# Patient Record
Sex: FEMALE | Race: WHITE | NOT HISPANIC OR LATINO | Employment: PART TIME | URBAN - NONMETROPOLITAN AREA
[De-identification: names, ages, dates, MRNs, and addresses within clinical notes are randomized per-mention and may not be internally consistent; named-entity substitution may affect disease eponyms.]

---

## 2017-01-19 ENCOUNTER — HOSPITAL ENCOUNTER (OUTPATIENT)
Dept: RADIOLOGY | Facility: OTHER | Age: 57
End: 2017-01-19
Attending: FAMILY MEDICINE

## 2017-01-19 ENCOUNTER — HISTORY (OUTPATIENT)
Dept: RADIOLOGY | Facility: OTHER | Age: 57
End: 2017-01-19

## 2017-01-19 DIAGNOSIS — Z12.31 ENCOUNTER FOR SCREENING MAMMOGRAM FOR MALIGNANT NEOPLASM OF BREAST: ICD-10-CM

## 2017-01-23 ENCOUNTER — AMBULATORY - GICH (OUTPATIENT)
Dept: RADIOLOGY | Facility: OTHER | Age: 57
End: 2017-01-23

## 2017-01-23 DIAGNOSIS — R92.8 OTHER ABNORMAL AND INCONCLUSIVE FINDINGS ON DIAGNOSTIC IMAGING OF BREAST: ICD-10-CM

## 2017-01-26 ENCOUNTER — HISTORY (OUTPATIENT)
Dept: RADIOLOGY | Facility: OTHER | Age: 57
End: 2017-01-26

## 2017-01-26 ENCOUNTER — AMBULATORY - GICH (OUTPATIENT)
Dept: RADIOLOGY | Facility: OTHER | Age: 57
End: 2017-01-26

## 2017-01-26 ENCOUNTER — HOSPITAL ENCOUNTER (OUTPATIENT)
Dept: RADIOLOGY | Facility: OTHER | Age: 57
End: 2017-01-26
Attending: FAMILY MEDICINE

## 2017-01-26 DIAGNOSIS — R92.8 OTHER ABNORMAL AND INCONCLUSIVE FINDINGS ON DIAGNOSTIC IMAGING OF BREAST: ICD-10-CM

## 2017-01-30 ENCOUNTER — AMBULATORY - GICH (OUTPATIENT)
Dept: RADIOLOGY | Facility: OTHER | Age: 57
End: 2017-01-30

## 2017-01-30 ENCOUNTER — HOSPITAL ENCOUNTER (OUTPATIENT)
Dept: RADIOLOGY | Facility: OTHER | Age: 57
End: 2017-01-30
Attending: FAMILY MEDICINE

## 2017-01-30 DIAGNOSIS — R92.8 OTHER ABNORMAL AND INCONCLUSIVE FINDINGS ON DIAGNOSTIC IMAGING OF BREAST: ICD-10-CM

## 2017-02-02 ENCOUNTER — OFFICE VISIT - GICH (OUTPATIENT)
Dept: SURGERY | Facility: OTHER | Age: 57
End: 2017-02-02

## 2017-02-02 ENCOUNTER — HISTORY (OUTPATIENT)
Dept: SURGERY | Facility: OTHER | Age: 57
End: 2017-02-02

## 2017-02-02 DIAGNOSIS — D24.2 BENIGN NEOPLASM OF LEFT BREAST: ICD-10-CM

## 2017-02-14 ENCOUNTER — HOSPITAL ENCOUNTER (OUTPATIENT)
Dept: RADIOLOGY | Facility: OTHER | Age: 57
End: 2017-02-14
Attending: FAMILY MEDICINE

## 2017-02-14 ENCOUNTER — COMMUNICATION - GICH (OUTPATIENT)
Dept: FAMILY MEDICINE | Facility: OTHER | Age: 57
End: 2017-02-14

## 2017-02-14 ENCOUNTER — OFFICE VISIT - GICH (OUTPATIENT)
Dept: FAMILY MEDICINE | Facility: OTHER | Age: 57
End: 2017-02-14

## 2017-02-14 ENCOUNTER — AMBULATORY - GICH (OUTPATIENT)
Dept: LAB | Facility: OTHER | Age: 57
End: 2017-02-14

## 2017-02-14 ENCOUNTER — HISTORY (OUTPATIENT)
Dept: FAMILY MEDICINE | Facility: OTHER | Age: 57
End: 2017-02-14

## 2017-02-14 DIAGNOSIS — R22.0 LOCALIZED SWELLING, MASS, AND LUMP OF HEAD: ICD-10-CM

## 2017-02-14 LAB
ANION GAP - HISTORICAL: 9 (ref 5–18)
BUN SERPL-MCNC: 17 MG/DL (ref 7–25)
BUN/CREAT RATIO - HISTORICAL: 20
CALCIUM SERPL-MCNC: 9.3 MG/DL (ref 8.6–10.3)
CHLORIDE SERPLBLD-SCNC: 104 MMOL/L (ref 98–107)
CO2 SERPL-SCNC: 27 MMOL/L (ref 21–31)
CREAT SERPL-MCNC: 0.85 MG/DL (ref 0.7–1.3)
GFR IF NOT AFRICAN AMERICAN - HISTORICAL: >60 ML/MIN/1.73M2
GLUCOSE SERPL-MCNC: 93 MG/DL (ref 70–105)
POTASSIUM SERPL-SCNC: 3.9 MMOL/L (ref 3.5–5.1)
SODIUM SERPL-SCNC: 140 MMOL/L (ref 133–143)

## 2017-06-14 ENCOUNTER — AMBULATORY - GICH (OUTPATIENT)
Dept: RADIOLOGY | Facility: OTHER | Age: 57
End: 2017-06-14

## 2017-06-14 DIAGNOSIS — Z98.890 OTHER SPECIFIED POSTPROCEDURAL STATES: ICD-10-CM

## 2017-09-06 ENCOUNTER — HISTORY (OUTPATIENT)
Dept: EMERGENCY MEDICINE | Facility: OTHER | Age: 57
End: 2017-09-06

## 2017-10-30 ENCOUNTER — AMBULATORY - GICH (OUTPATIENT)
Dept: RADIOLOGY | Facility: OTHER | Age: 57
End: 2017-10-30

## 2017-10-30 DIAGNOSIS — Z98.890 OTHER SPECIFIED POSTPROCEDURAL STATES: ICD-10-CM

## 2018-01-03 NOTE — PROGRESS NOTES
Patient Information     Patient Name MRN Sex Felicitas Douglas [id number] Female 1960      Progress Notes by Joyce Howard R.T. (ARRT) at 2017 12:46 PM     Author:  Joyce Howard R.T. (ARRT) Service:  (none) Author Type:  (none)     Filed:  2017 12:46 PM Date of Service:  2017 12:46 PM Status:  Signed     :  Joyce Howard R.T. (LUIS [facility]BERTOT) (Formerly [facility] - Registered Radiologic Technologist)            Falls Risk Criteria:    Age 65 and older or under age 4        Sensory deficits    Poor vision    Use of ambulatory aides    Impaired judgment    Unable to walk independently    Meets High Risk criteria for falls:  no

## 2018-01-03 NOTE — PROGRESS NOTES
"Patient Information     Patient Name MRN Sex Felicitas Douglas [id number] Female 1960      Progress Notes by Vel Conroy MD at 2017 10:15 AM     Author:  Vel Conroy MD Service:  (none) Author Type:  Physician     Filed:  2017 10:50 AM Encounter Date:  2017 Status:  Signed     :  Vel Conroy MD (Physician)            SUBJECTIVE:    Felicitas Rojas is a 57 y.o. female who presents for lump on jaw    HPI    Has notes a lump near the right mandible of her jaw.  Seems to come and go.  Worse swelling tin the night.  Can get painful when swollen.  Improves throughout the day.  First noted it 6 months ago or so.  Getting more frequent with flares.  No fevers.  Recently saw her dentist about this who advised she see us.  Not tooth related.    Allergies      Allergen   Reactions     Cobamamide  *Unknown - Pt Doesn't Remember     Cyclobenzaprine  Dizziness     Lightheadedness. Patient unsure if this is true.       Keflex [Cephalexin]  Itching     pt wrote \"redness irritation, inflamation , pus at point of incision\"  questionable allergy      Sulfa (Sulfonamide Antibiotics)  *Unknown - Childhood Rxn     Sulfacetamide Sodium  *Unknown - Pt Doesn't Remember     Vitamin B12-Vitamin B1  Rash     Sublingual vitamin    ,   Current Outpatient Prescriptions on File Prior to Visit       Medication  Sig Dispense Refill     aspirin (ECOTRIN) 81 mg enteric coated tablet Take 1 tablet by mouth once daily with a meal.  0     diltiazem (CARDIZEM) 60 mg tablet Take 1 tablet up to three times daily as needed for arrhythmia recurrence. 90 tablet 2     No current facility-administered medications on file prior to visit.    ,   Past Medical History      Diagnosis   Date     HERNIATED DISC       L5-S1       MENISCUS TEAR  2010     R knee; chronic mild problems       Postmenopausal  2014    and   Past Surgical History       Procedure   Laterality Date     Hx orif  Left      thumb fracture "       Release of sheath where tendons pass from thumb to wrist   1985 right & 2006 left     Forearm/wrist surgery   1985     Surgery for De Quervain's tenosynovitis right wrist about 1985       Skin/subcutaneous surgery   01/4/02     Excision of an intradermal nevus of the preauricular area       Hand finger surgery   03/06     Ulnar collateral ligament repair left thumb, Dr. Joy         REVIEW OF SYSTEMS:  Review of Systems   Constitutional: Negative for chills and fever.   Skin: Negative for itching and rash.   Neurological: Negative for headaches.       OBJECTIVE:  /70  Resp 16    EXAM:   Physical Exam   Constitutional: She is well-developed, well-nourished, and in no distress.   HENT:   Head: Normocephalic and atraumatic.   Right Ear: External ear normal.   Left Ear: External ear normal.   Mouth/Throat: Oropharynx is clear and moist.   Firm nodule along inferior right mandible, about 4 cm anterior to the angle.  1 cm or so in diameter.  feels fixed to the bone.   Lymphadenopathy:     She has no cervical adenopathy.       ASSESSMENT/PLAN:    ICD-10-CM    1. Mass of mandible R22.0 CT NECK SOFT TISSUE WO        Plan:  I suspect this is a cyst.  The fact this comes and goes, rather quickly, most likely means it is related to a salivary duct obstruction, possible stone.  Tumor is also possible so will proceed with the CT scan and have her try lemon drops in the meantime.    Vel Conroy MD ....................  2/14/2017   10:49 AM

## 2018-01-03 NOTE — PROGRESS NOTES
Patient Information     Patient Name MRN Sex Felicitas Douglas [id number] Female 1960      Progress Notes by Shonda Vitale DO at 2017 10:10 AM     Author:  Shonda Vitale DO Service:  (none) Author Type:  PHYS- Osteopathic     Filed:  2017 12:10 PM Encounter Date:  2017 Status:  Signed     :  Shonda Vitale DO (PHYS- Osteopathic)            OFFICE CONSULTATION NOTE  Patient Name: Felicitas Rojas  Address: [address] [address]  Age:57 y.o.  Sex: female     Primary Care Physician: Adriana Dominguez MD    I was requested to see this patient in consultation by  Adriana Dominguez MD for follow up from recent breast bopsy.   A copy of this note will be sent to Adriana Dominguez MD.    HPI:   The patient is 57 y.o. female for f/u from recent breast biopsy for an enlarging solid nodule on mary lou. She has no breast pain bilaterally. She has no nipple drainage bilaterally. She has not noted any new skin skin lesions on the breasts.  Patient has some moderate brusing from recent biopsy- otherwise no problems.          Previous breast problems: cysts  Previous breast biopsy's: no    Menarche: n/a  Age of first pregnancy:[age]    Breast feeding: tried  Menopause: approc 52  HRT: no    Family history    Breast problems: no  Breast cancer: no  Ovarian cancer: no  Colon cancer: polyps        REVIEW OF SYSTEMS  GENERAL: No fevers or chills. Denies fatigue, recent weight loss.  HEENT: No sinus drainage. No changes with vision or hearing. No difficulty swallowing.   LYMPHATICS:  No swollen nodes in axilla, neck or groin.  CARDIOVASCULAR: Denies chest pain, palpitations and dyspnea on exertion.  PULMONARY: No shortness of breath or cough. No increase in sputum production.  GI: Denies melena, bright red blood in stools. No hematemesis. No constipation or diarrhea.  : No dysuria or hematuria.  SKIN: No recent rashes or ulcers.   HEMATOLOGY:  No history of easy bruising or  "bleeding.  ENDOCRINE:  No history of diabetes or thyroid problems.  NEUROLOGY:  No history of seizures or headaches. No motor or sensory changes.  BREAST:  Denies breast pain.    PAST MEDICAL HISTORY  Past Medical History      Diagnosis   Date     HERNIATED DISC       L5-S1       MENISCUS TEAR  9/13/2010     R knee; chronic mild problems       Postmenopausal  7/17/2014      PAST SURGICAL HISTORY  Past Surgical History       Procedure   Laterality Date     Hx orif  Left 2006     thumb fracture       Release of sheath where tendons pass from thumb to wrist   1985 right & 2006 left     Forearm/wrist surgery   1985     Surgery for De Quervain's tenosynovitis right wrist about 1985       Skin/subcutaneous surgery   01/4/02     Excision of an intradermal nevus of the preauricular area       Hand finger surgery   03/06     Ulnar collateral ligament repair left thumb, Dr. Joy        CURRENT MEDS  Current Outpatient Prescriptions on File Prior to Visit       Medication  Sig Dispense Refill     aspirin (ECOTRIN) 81 mg enteric coated tablet Take 1 tablet by mouth once daily with a meal.  0     diltiazem (CARDIZEM) 60 mg tablet Take 1 tablet up to three times daily as needed for arrhythmia recurrence. 90 tablet 2     No current facility-administered medications on file prior to visit.      ALLERGIES/SENSITIVITIES  Allergies      Allergen   Reactions     Cobamamide  *Unknown - Pt Doesn't Remember     Cyclobenzaprine  Dizziness     Lightheadedness. Patient unsure if this is true.       Keflex [Cephalexin]  Itching     pt wrote \"redness irritation, inflamation , pus at point of incision\"  questionable allergy      Sulfa (Sulfonamide Antibiotics)  *Unknown - Childhood Rxn     Sulfacetamide Sodium  *Unknown - Pt Doesn't Remember     Vitamin B12-Vitamin B1  Rash     Sublingual vitamin      FAMILY HISTORY  Family History       Problem   Relation Age of Onset     Other  Mother 70     Alzheimer's       Cancer-prostate  Father  " "    Diabetes  Father      GI Disease  Father      colon polyps       Heart Disease  Father      CAD       Cancer  Paternal Grandmother      eye cancer, type unknown       Stroke  No Family History      No FHx of CAD       Cancer-breast  No Family History       SOCIAL HISTORY  Social History     Social History        Marital status:       Spouse name: N/A     Number of children:  N/A     Years of education:  N/A     Occupational History      Not on file.     Social History Main Topics        Smoking status:  Never Smoker     Smokeless tobacco:  Never Used     Alcohol use  No     Drug use:  No     Sexual activity:  Yes     Partners: Male     Other Topics   Concern      Service  No     Blood Transfusions  No     Caffeine Concern  No     recently eliminated caffeine 5/19/16      Occupational Exposure  No     Hobby Hazards  No     Sleep Concern  Yes     light sleeper, wakes up often in the night      Stress Concern  No     normal daily stressors-busy lifestyle      Weight Concern  Yes     \"higher than it's ever been, working on it\"      Special Diet  No     trying for lower sodium      Back Care  Yes     Chiropractor occasionally      Exercise  Yes     daily elliptical machine, lawn care and active lifestyle      Bike Helmet  No     Doesn't ride      Seat Belt  Yes     Self-Exams  No     Social History Narrative     .  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    Cullen Spouse     Children     Vinnie born 1985,     Tony born 1989.                  PHYSICAL EXAM  /76  Ht 1.727 m (5' 8\")  Wt 88.5 kg (195 lb)  BMI 29.65 kg/m2  Body mass index is 29.65 kg/(m^2).    GENERAL: Healthy appearing patient in no acute distress. Pleasant and cooperative with exam and interview.   HEENT: Head-normocephalic. Eyes-no scleral icterus, pupils equal, round, and reactive to light. Nose-no nasal drainage. No lesions. Mouth-oral mucosa pink and moist, no lesions.  NECK: Supple. Trachea midline.    CV: Regular " rate and rhythm, no murmurs. No peripheral edema.  LUNGS:  No respiratory distress. Clear bilaterally to auscultation.  ABDOMEN: Non distended. Bowel sounds active. Soft, non-tender,   SKIN: Pink, warm and dry. No jaundice. No rash.  NEURO:  Cranial nerves II-XII grossly intact. Alert and oriented.  PSYCH: Appropriate mood and affect.  BREAST: Breasts were examined in the seated and supine position.  No nipple changes or discharge bilaterally. Appropriate tenderness noted.  Skin changes: puncture site is C/D/I.  moderate bruising / sm hematoma       IMAGING/LAB  I personally reviewed patient's recent mamm and US  Report.  Pathology- see EPIC       Plan    Fibroadenoma  Biopsy done  Repeat mamm in 6 months L- orders placed  Routine self breast exams  Lifestyle/riks factor modification.  Follow up for yearly PE     Shonda Vitale DO

## 2018-01-03 NOTE — PROGRESS NOTES
Patient Information     Patient Name MRN Sex Felicitas Douglas [id number] Female 1960      Progress Notes by Hue Orozco at 2017  1:43 PM     Author:  Hue Orozco Service:  (none) Author Type:  Other Clinical Staff     Filed:  2017  1:43 PM Date of Service:  2017  1:43 PM Status:  Signed     :  Hue Orozco (Other Clinical Staff)            IV Contrast- Discharge Instructions After Your CT Scan      The IV contrast you received today will be filtered from your bloodstream by your kidneys during the next 24 hours and pass from the body in urine.  You will not be aware of this process and your urine will not change in color.  To help this process you should drink at least 4 additional glasses of water or juice today.  This reduces stress on your kidneys.    Most contrast reactions are immediate.  Should you develop symptoms of concern after discharge, contact the department at the number below.  After hours you should contact your personal physician.  If you develop breathing distress or wheezing, call 911.

## 2018-01-03 NOTE — PROGRESS NOTES
Patient Information     Patient Name MRN Sex Felicitas Douglas [id number] Female 1960      Progress Notes by Hue Orozco at 2017  1:43 PM     Author:  Hue Orozco Service:  (none) Author Type:  Other Clinical Staff     Filed:  2017  1:43 PM Date of Service:  2017  1:43 PM Status:  Signed     :  Hue Orozco (Other Clinical Staff)            1.  Has the patient had a previous reaction to IV contrast? No    2.  Does the patient have kidney disease? No    3.  Is the patient on dialysis? No    If YES to any of these questions, exam will be reviewed with a Radiologist before administering contrast.

## 2018-01-03 NOTE — PATIENT INSTRUCTIONS
Patient Information     Patient Name MRN Sex Felicitas Douglas [id number] Female 1960      Patient Instructions by Shonda Vitale DO at 2017 10:10 AM     Author:  Shonda Vitale DO  Service:  (none) Author Type:  PHYS- Osteopathic     Filed:  2017 10:35 AM  Encounter Date:  2017 Status:  Addendum     :  Shonda Vitale DO (PHYS- Osteopathic)        Related Notes: Original Note by Shonda Vitale DO (PHYS- Osteopathic) filed at 2017 10:34 AM            Repeat mamm on left side 6months  Use heat compress/heating pad for small hematoma    Diagnosis: fibroadenoma

## 2018-01-03 NOTE — PROGRESS NOTES
Patient Information     Patient Name MRN Sex Felicitas Douglas [id number] Female 1960      Progress Notes by Hue Orozco at 2017  1:43 PM     Author:  Hue Orozco Service:  (none) Author Type:  Other Clinical Staff     Filed:  2017  1:43 PM Date of Service:  2017  1:43 PM Status:  Signed     :  Hue Orozco (Other Clinical Staff)            Falls Risk Criteria:    Age 65 and older or under age 4        Sensory deficits    Poor vision    Use of ambulatory aides    Impaired judgment    Unable to walk independently    Meets High Risk criteria for falls:  no

## 2018-01-03 NOTE — PROGRESS NOTES
Patient Information     Patient Name MRN Sex Felicitas Douglas [id number] Female 1960      Progress Notes by Joyce Howard R.T. (ARRT) at 2017  3:23 PM     Author:  Joyce Howard R.T. (ARRT) Service:  (none) Author Type:  (none)     Filed:  2017  3:23 PM Date of Service:  2017  3:23 PM Status:  Signed     :  Joyce Howard R.T. ([facility]) ([facility] - Registered Radiologic Technologist)            Falls Risk Criteria:    Age 65 and older or under age 4        Sensory deficits    Poor vision    Use of ambulatory aides    Impaired judgment    Unable to walk independently    Meets High Risk criteria for falls:  no

## 2018-01-03 NOTE — PROGRESS NOTES
Patient Information     Patient Name MRN Sex Felicitas Douglas [id number] Female 1960      Progress Notes by Joyce Howard R.T. (ARRT) at 2017  3:33 PM     Author:  Joyce Howard R.T. (ARRT) Service:  (none) Author Type:  (none)     Filed:  2017  3:33 PM Date of Service:  2017  3:33 PM Status:  Signed     :  Joyce Howard R.T. (LUIS ALBERTOT) ([facility] - Registered Radiologic Technologist)            Falls Risk Criteria:    Age 65 and older or under age 4        Sensory deficits    Poor vision    Use of ambulatory aides    Impaired judgment    Unable to walk independently    Meets High Risk criteria for falls:  no

## 2018-01-03 NOTE — NURSING NOTE
Patient Information     Patient Name Felicitas Villaseñor [id number] Female 1960      Nursing Note by Lynn Wooten at 2017 10:10 AM     Author:  Lynn Wooten Service:  (none) Author Type:  (none)     Filed:  2017 10:18 AM Encounter Date:  2017 Status:  Signed     :  Lynn Wooten            Presents today for breast biopsy results.  Lynn Wooten LPN..........2017  10:13 AM    At what age did you start menopause? 50's  How many children do you have? 2  What age did your menstrual cycle start? 11  How old were you when your first child was born? 25  Did you breast feed? yes  Are you on or have you ever taken any hormone replacement or birth control? Birth control for a couple months  Do you have a family history of breast cancer? no    Lynn Wooten LPN..........2017  10:18 AM

## 2018-01-03 NOTE — PROGRESS NOTES
Patient Information     Patient Name MRN Sex Felicitas Douglas [id number] Female 1960      Progress Notes by Lyly Dickens RN at 2017  3:39 PM     Author:  Lyly Dickens RN Service:  (none) Author Type:  NURS- Registered Nurse     Filed:  2017  3:41 PM Date of Service:  2017  3:39 PM Status:  Signed     :  Lyly Dickens RN (NURS- Registered Nurse)            Pressure held on biopsy site for 5 minutes. Sterile 2x2 placed over insertion site and covered with a sterile tegaderm.    Patient brought to mamms for post clip mammogram:  Yes    Sports bra and small ice pad in place over dressing.      Discharge Note    Data:  Felicitas Rojas has been discharged home at 1538 via ambulatory accompanied by Registered Nurse.      Action:  Written discharge/follow-up instructions were provided to patient. Prescriptions : None.  Belongings sent with patient. Medications from home sent with patient/family: Not Applicable  Equipment none .     Response:  Patient verbalized understanding of discharge instructions, reason for discharge, and necessary follow-up appointments.

## 2018-01-16 PROBLEM — L71.9 ACNE ROSACEA: Status: ACTIVE | Noted: 2018-01-16

## 2018-01-16 PROBLEM — R63.5 WEIGHT GAIN: Status: ACTIVE | Noted: 2018-01-16

## 2018-01-16 RX ORDER — ASPIRIN 81 MG/1
81 TABLET ORAL
COMMUNITY
Start: 2016-10-11 | End: 2018-12-20

## 2018-01-16 RX ORDER — DILTIAZEM HCL 60 MG
TABLET ORAL
COMMUNITY
Start: 2016-10-11 | End: 2019-01-17

## 2018-01-28 VITALS
HEIGHT: 68 IN | BODY MASS INDEX: 29.55 KG/M2 | SYSTOLIC BLOOD PRESSURE: 110 MMHG | DIASTOLIC BLOOD PRESSURE: 76 MMHG | WEIGHT: 195 LBS

## 2018-01-28 VITALS — SYSTOLIC BLOOD PRESSURE: 126 MMHG | DIASTOLIC BLOOD PRESSURE: 70 MMHG | RESPIRATION RATE: 16 BRPM

## 2018-01-29 ENCOUNTER — HOSPITAL ENCOUNTER (OUTPATIENT)
Dept: RADIOLOGY | Facility: OTHER | Age: 58
End: 2018-01-29
Attending: FAMILY MEDICINE

## 2018-01-29 DIAGNOSIS — Z98.890 OTHER SPECIFIED POSTPROCEDURAL STATES: ICD-10-CM

## 2018-02-13 NOTE — PROGRESS NOTES
Patient Information     Patient Name MRN Sex Felicitas Douglas [id number] Female 1960      Progress Notes by Radha Fermin at 2018  1:38 PM     Author:  Radha Fermin Service:  (none) Author Type:  (none)     Filed:  2018  1:38 PM Date of Service:  2018  1:38 PM Status:  Signed     :  Radha Fermin            Falls Risk Criteria:    Age 65 and older or under age 4        Sensory deficits    Poor vision    Use of ambulatory aides    Impaired judgment    Unable to walk independently    Meets High Risk criteria for falls:  no

## 2018-07-23 NOTE — PROGRESS NOTES
Patient Information     Patient Name  Felicitas Rojas MRN  [id number] Sex  Female   1960      Letter by Radha Fermin at      Author:  Radha Fremin Service:  (none) Author Type:  (none)    Filed:   Date of Service:   Status:  (Other)       [facility]  [address]  [facility] [address]  [phone]                 Felicitas Rojas  [address]  [place] [facility] [address]              2017    Dear Ms. Felicianoer:    It is time to schedule your follow up breast imaging.  Please call [phone] to schedule an appointment for this test if you have not already done so.    Thank you for choosing [facility] to participate in your healthcare needs.     Sincerely,    Mammography      [facility]                             [facility] Recommendations for Early Breast Cancer Detection   in Women without Symptoms  Ages 40-49: encouraged to have a screening mammogram every year or talk with your health care provider  Ages 50-74: should have a screening mammogram every year  Ages 75 and older: talk with your health care provider about how often to schedule a mammogram

## 2018-07-23 NOTE — PROGRESS NOTES
Patient Information     Patient Name  Felicitas Rojas MRN  [id number] Sex  Female   1960      Letter by Iveth Antoine MD at      Author:  Iveth Antoine MD Service:  (none) Author Type:  (none)    Filed:   Date of Service:   Status:  (Other)       [facility]  [address]  [facility] [address]  [phone]         Felicitas Rojas   [address]  [facility] [address]      2017  Date of Breast Imagin2017 12:59 PM    Dear Ms. Rojas:    Your recent breast imaging examination showed a finding that requires additional imaging studies for a complete evaluation. Most findings are benign (not cancer). Please call [phone] to schedule an appointment for these tests if you have not already done so.    A report of your results was sent to your health care provider(s).    Your mammogram shows that your breast tissue is dense.  Dense breast tissue is relatively common and is found in more than 40 percent of women.  However, dense breast tissue may make it harder to identify cancer through a mammogram.  It may also be related to an increased risk of breast cancer.    The results of your mammogram are given to you and your primary care provider.  This information will raise your own awareness and help you talk with your primary care provider about your screening options.  Together you can decide which options are right for you based on your mammogram results, risk factors or physical exam.    Your images will become part of your medical file here at [facility] and will be available for your continuing care. You are responsible for informing any new health care provider or breast imaging facility of the date and location of this examination.    Although mammography is the most accurate method for early detection, not all cancers are found through mammography. If you notice any new changes in your breast(s) please inform your health care provider without  delay.    Thank you for choosing [facility] to participate in your healthcare needs.       [facility] Recommendations for Early Breast Cancer Detection   in Women without Symptoms  When to start having mammograms to screen for breast cancer, and how often to have them, is a personal decision. It should be based on your preferences, your values and your risk for developing breast cancer. [facility] recommends that you and your health care provider together determine when mammograms are right for you.    [facility] recommends the following guidelines for women who have an average risk for breast cancer, based on American Cancer Society guidelines:    Age 40 to 44: Mammograms are optional.     Age 45 to 54: Have a mammogram every year.    Age 55 and older: Have a mammogram every year, or transition to having one every 2 years. Continue to have mammograms as long as your health is good.    If you have a higher than average risk for breast cancer, your health care provider may recommend a different schedule.

## 2018-07-23 NOTE — PROGRESS NOTES
Patient Information     Patient Name  Felicitas Rojas MRN  [id number] Sex  Female   1960      Letter by Iveth Antoine MD at      Author:  Iveth Antoine MD Service:  (none) Author Type:  (none)    Filed:   Date of Service:   Status:  (Other)       [facility]  [address]  [facility] [address]  [phone]         Felicitas Rojas   [address]  [facility] [address]      2017  Date of Breast Imagin2017  3:44 PM    Dear Ms. Rojas:    Your recent breast imaging examination showed a finding that requires additional imaging studies for a complete evaluation. Most findings are benign (not cancer). Please call [phone] to schedule an appointment for these tests if you have not already done so.    A report of your results was sent to your health care provider(s).    Your mammogram shows that your breast tissue is dense.  Dense breast tissue is relatively common and is found in more than 40 percent of women.  However, dense breast tissue may make it harder to identify cancer through a mammogram.  It may also be related to an increased risk of breast cancer.    The results of your mammogram are given to you and your primary care provider.  This information will raise your own awareness and help you talk with your primary care provider about your screening options.  Together you can decide which options are right for you based on your mammogram results, risk factors or physical exam.    Your images will become part of your medical file here at [facility] and will be available for your continuing care. You are responsible for informing any new health care provider or breast imaging facility of the date and location of this examination.    Although mammography is the most accurate method for early detection, not all cancers are found through mammography. If you notice any new changes in your breast(s) please inform your health care provider without  delay.    Thank you for choosing [facility] to participate in your healthcare needs.       [facility] Recommendations for Early Breast Cancer Detection   in Women without Symptoms  When to start having mammograms to screen for breast cancer, and how often to have them, is a personal decision. It should be based on your preferences, your values and your risk for developing breast cancer. [facility] recommends that you and your health care provider together determine when mammograms are right for you.    [facility] recommends the following guidelines for women who have an average risk for breast cancer, based on American Cancer Society guidelines:    Age 40 to 44: Mammograms are optional.     Age 45 to 54: Have a mammogram every year.    Age 55 and older: Have a mammogram every year, or transition to having one every 2 years. Continue to have mammograms as long as your health is good.    If you have a higher than average risk for breast cancer, your health care provider may recommend a different schedule.

## 2018-07-23 NOTE — PROGRESS NOTES
Patient Information     Patient Name  Felicitas Rojas MRN  [id number] Sex  Female   1960      Letter by Db Lozano MD at      Author:  Db Lozano MD Service:  (none) Author Type:  (none)    Filed:   Date of Service:   Status:  (Other)       [facility]  [address]  [facility] [address]  [phone]         Felicitas Rojas   [address]  [facility] [address]      2018  Date of Breast Imagin2018  1:48 PM    Dear Ms. Rojas:    We are pleased to inform you that the result of your recent breast imaging examination is normal/benign (not cancer).    A report of your results was sent to your health care provider(s).    Your images will become part of your medical file here at [facility] and will be available for your continuing care. You are responsible for informing any new health care provider or breast imaging facility of the date and location of this examination.    Although mammography is the most accurate method for early detection, not all cancers are found through mammography. If you notice any new changes in your breast(s) please inform your health care provider without delay.    Thank you for choosing [facility] to participate in your healthcare needs.         [facility] Recommendations for Early Breast Cancer Detection   in Women without Symptoms  When to start having mammograms to screen for breast cancer, and how often to have them, is a personal decision. It should be based on your preferences, your values and your risk for developing breast cancer. [facility] recommends that you and your health care provider together determine when mammograms are right for you.    [facility] recommends the following guidelines for women who have an average risk for breast cancer, based on American Cancer Society guidelines:    Age 40 to 44: Mammograms  are optional.     Age 45 to 54: Have a mammogram every year.           Age 55 and older: Have a mammogram every year, or transition to having one every 2 years. Continue to have mammograms as long as your health is good.    If you have a higher than average risk for breast cancer, your health care provider may recommend a different schedule.

## 2018-07-24 NOTE — PROGRESS NOTES
Patient Information     Patient Name  Felicitas Rojas MRN  [id number] Sex  Female   1960      Letter by Radha Fermin at      Author:  Radha Fermin Service:  (none) Author Type:  (none)    Filed:   Date of Service:   Status:  (Other)       [facility]  [address]  [facility] [address]  [phone]                 Felicitas Rojas  [address]  [facility] [address]              2017    Dear Ms. Crystal:    It is time to schedule your follow up breast imaging 2018.  Please call [phone] to schedule an appointment for this test if you have not already done so.    Thank you for choosing [facility] to participate in your healthcare needs.     Sincerely,    Mammography      [facility]                             [facility] Recommendations for Early Breast Cancer Detection   in Women without Symptoms  Ages 40-49: encouraged to have a screening mammogram every year or talk with your health care provider  Ages 50-74: should have a screening mammogram every year  Ages 75 and older: talk with your health care provider about how often to schedule a mammogram

## 2018-12-17 ENCOUNTER — NURSE TRIAGE (OUTPATIENT)
Dept: FAMILY MEDICINE | Facility: OTHER | Age: 58
End: 2018-12-17

## 2018-12-17 NOTE — TELEPHONE ENCOUNTER
"Pt c/o of sudden dizziness as if the room tilted as if her head and eyes \"lost control\"...lasted a few seconds.    Pt shares that she was sitting down writing out envelopes.  She got up off of the chair and retrieved paper from the printer and sat back down.  When she reached for her pencil, she felt as though the room was tilted and her eyes were not focused...lasted only a few seconds.  States the episode has past and did not experience another one.      Pt shares that her A-fib has been well controlled with Cardizem as needed.  Pt denies chest pain, SOB, unilateral weakness/numbness, blurry vision, headache, irregular heart rate/rhythm,hx of low BG/BP, fever, ear pain.  Pt does share that about 10 years ago she was diagnosed with occular migraines but they have since resolved.  In 2016 pt experienced dizziness due to Afib but shares this is different and was if the room is tilting.  Shares she has not had her BP checked recently and would like to establish care with someone as she is a former patient of Dr. Adriana Dominguez.  Also shares she realizes she also needs to f/u with cardiology.     Pt shares she does hear a crunchy-like sound in her ears at times as if there is fluid and has been fighting a cold like virus the past 2 months which has improved.  Pt shares she does not recall ever having cerumen impacted in ear(s).  Denies ear pressure.    Determined episode more than likely vertigo r/t inner ear dysfunction.  Pt to f/u with a provider/establish care.  Advised pt to present to ED with  is symptoms returned and/or if she experienced severe headache, weakness, numbness, SOB, chest pain, blurry vision, irregular heart rate.  Pt verbalized understanding an was in agreement with plan.      Transferred to scheduling and noted upcoming appt with Rosa Conde APRN, NP 12/20/2018.    Zully Epperson RN  ....................  12/17/2018   2:34 PM      Reason for Disposition    [1] NO dizziness now AND [2] one or " "more stroke risk factors (i.e., hypertension, diabetes, prior stroke/TIA/heart attack)    Answer Assessment - Initial Assessment Questions  1. DESCRIPTION: \"Describe your dizziness.\"      Was as if she became tilted for a few seconds  2. VERTIGO: \"Do you feel like either you or the room is spinning or tilting?\"       tilting  3. LIGHTHEADED: \"Do you feel lightheaded?\" (e.g., somewhat faint, woozy, weak upon standing)      Not really  4. SEVERITY: \"How bad is it?\"  \"Can you walk?\"    - MILD - Feels unsteady but walking normally.    - MODERATE - Feels very unsteady when walking, but not falling; interferes with normal activities (e.g., school, work) .    - SEVERE - Unable to walk without falling (requires assistance).      Was mild-moderate  5. ONSET:  \"When did the dizziness begin?\"      Just a few moments ago when she got up to go to the printer after sitting at a desk.  She sat back down and reached for pencil and felt the room tilt and her eyes felt \"weird\"  6. AGGRAVATING FACTORS: \"Does anything make it worse?\" (e.g., standing, change in head position)      Unsure as is gone now  7. CAUSE: \"What do you think is causing the dizziness?\"      Unsure    8. RECURRENT SYMPTOM: \"Have you had dizziness before?\" If so, ask: \"When was the last time?\" \"What happened that time?\"      Nothing like this.  Has had dizzy spells with her A-fib but felt different.  Also with occular migraine but still felt different  9. OTHER SYMPTOMS: \"Do you have any other symptoms?\" (e.g., headache, weakness, numbness, vomiting, earache)      No  10. PREGNANCY: \"Is there any chance you are pregnant?\" \"When was your last menstrual period?\"        No    Protocols used: DIZZINESS - VERTIGO-ADULT-AH      "

## 2018-12-20 ENCOUNTER — OFFICE VISIT (OUTPATIENT)
Dept: FAMILY MEDICINE | Facility: OTHER | Age: 58
End: 2018-12-20
Attending: NURSE PRACTITIONER
Payer: COMMERCIAL

## 2018-12-20 VITALS
HEART RATE: 60 BPM | BODY MASS INDEX: 30.2 KG/M2 | WEIGHT: 192.4 LBS | HEIGHT: 67 IN | SYSTOLIC BLOOD PRESSURE: 130 MMHG | TEMPERATURE: 96.7 F | DIASTOLIC BLOOD PRESSURE: 70 MMHG

## 2018-12-20 DIAGNOSIS — R59.9 ENLARGED LYMPH NODE: ICD-10-CM

## 2018-12-20 DIAGNOSIS — Z00.00 ENCOUNTER FOR SCREENING AND PREVENTATIVE CARE: ICD-10-CM

## 2018-12-20 DIAGNOSIS — M54.2 NECK PAIN: ICD-10-CM

## 2018-12-20 DIAGNOSIS — I48.0 PAF (PAROXYSMAL ATRIAL FIBRILLATION) (H): Primary | ICD-10-CM

## 2018-12-20 LAB
ALBUMIN SERPL-MCNC: 4.1 G/DL (ref 3.5–5.7)
ALP SERPL-CCNC: 91 U/L (ref 34–104)
ALT SERPL W P-5'-P-CCNC: 18 U/L (ref 7–52)
ANION GAP SERPL CALCULATED.3IONS-SCNC: 6 MMOL/L (ref 3–14)
AST SERPL W P-5'-P-CCNC: 17 U/L (ref 13–39)
BILIRUB SERPL-MCNC: 0.4 MG/DL (ref 0.3–1)
BUN SERPL-MCNC: 14 MG/DL (ref 7–25)
CALCIUM SERPL-MCNC: 9.3 MG/DL (ref 8.6–10.3)
CHLORIDE SERPL-SCNC: 105 MMOL/L (ref 98–107)
CHOLEST SERPL-MCNC: 227 MG/DL
CO2 SERPL-SCNC: 31 MMOL/L (ref 21–31)
CREAT SERPL-MCNC: 0.77 MG/DL (ref 0.6–1.2)
GFR SERPL CREATININE-BSD FRML MDRD: 76 ML/MIN/{1.73_M2}
GLUCOSE SERPL-MCNC: 100 MG/DL (ref 70–105)
HDLC SERPL-MCNC: 72 MG/DL (ref 23–92)
LDLC SERPL CALC-MCNC: 141 MG/DL
NONHDLC SERPL-MCNC: 155 MG/DL
POTASSIUM SERPL-SCNC: 3.7 MMOL/L (ref 3.5–5.1)
PROT SERPL-MCNC: 7.1 G/DL (ref 6.4–8.9)
SODIUM SERPL-SCNC: 142 MMOL/L (ref 134–144)
TRIGL SERPL-MCNC: 69 MG/DL

## 2018-12-20 PROCEDURE — 36415 COLL VENOUS BLD VENIPUNCTURE: CPT | Performed by: NURSE PRACTITIONER

## 2018-12-20 PROCEDURE — 99396 PREV VISIT EST AGE 40-64: CPT | Performed by: NURSE PRACTITIONER

## 2018-12-20 PROCEDURE — 80061 LIPID PANEL: CPT | Performed by: NURSE PRACTITIONER

## 2018-12-20 PROCEDURE — 80053 COMPREHEN METABOLIC PANEL: CPT | Performed by: NURSE PRACTITIONER

## 2018-12-20 ASSESSMENT — MIFFLIN-ST. JEOR: SCORE: 1479.31

## 2018-12-20 ASSESSMENT — ENCOUNTER SYMPTOMS: DIZZINESS: 1

## 2018-12-20 ASSESSMENT — PAIN SCALES - GENERAL: PAINLEVEL: NO PAIN (0)

## 2018-12-20 NOTE — NURSING NOTE
"Chief Complaint   Patient presents with     Physical     yearly physical, pap establish care        Initial /70 (BP Location: Right arm, Patient Position: Sitting, Cuff Size: Adult Large)   Pulse 60   Temp 96.7  F (35.9  C) (Temporal)   Ht 1.708 m (5' 7.25\")   Wt 87.3 kg (192 lb 6.4 oz)   Breastfeeding? No   BMI 29.91 kg/m   Estimated body mass index is 29.91 kg/m  as calculated from the following:    Height as of this encounter: 1.708 m (5' 7.25\").    Weight as of this encounter: 87.3 kg (192 lb 6.4 oz).  Medication Reconciliation: complete    Mckenzie Gutierrez LPN  "

## 2018-12-20 NOTE — PROGRESS NOTES
"Nursing Notes:   Mckenzie Gutierrez LPN  12/20/2018 10:58 AM  Signed  Chief Complaint   Patient presents with     Physical     yearly physical, pap establish care        Initial /70 (BP Location: Right arm, Patient Position: Sitting, Cuff Size: Adult Large)   Pulse 60   Temp 96.7  F (35.9  C) (Temporal)   Ht 1.708 m (5' 7.25\")   Wt 87.3 kg (192 lb 6.4 oz)   Breastfeeding? No   BMI 29.91 kg/m    Estimated body mass index is 29.91 kg/m  as calculated from the following:    Height as of this encounter: 1.708 m (5' 7.25\").    Weight as of this encounter: 87.3 kg (192 lb 6.4 oz).  Medication Reconciliation: complete    Mckenzie Gutierrez LPN  Nursing note reviewed with patient.  Accurracy and completeness verified.   Ms. Rojas is a 58 year old female who:  Patient presents with:  Physical: yearly physical, pap establish care       ICD-10-CM    1. PAF (paroxysmal atrial fibrillation) (H) I48.0 CARDIOLOGY EVAL ADULT REFERRAL     Lipid Panel     Comprehensive Metabolic Panel     aspirin (ASA) 81 MG EC tablet     Comprehensive Metabolic Panel     Lipid Panel   2. Neck pain M54.2 CT Soft Tissue Neck w Contrast     CANCELED: XR Cervical Spine 2/3 Views   3. Enlarged lymph node R59.9    4. Encounter for screening and preventative care Z00.00      Physical     PHQ-2 Total Score: 0    Presents for yearly preventive health and other issues  Diagnosed with paroxysmal atrial fibrillation a few years ago, seen by Dr. Troy [place] heart had Holter monitoring and diagnostics--I reviewed that note in epic as there were several treatments that were recommended however the patient declined suggestions  Reports was referred for an ablation however she decided she did not want to have this done  Recommended to take low-dose aspirin--she did not feel the cardiologist felt this was important, she does carry PRN diltiazem which she uses only when she is aware of her atrial fibrillation      She also is aware of her triggers " "and she feels this is best managed.  She feels that she is completely aware of all of her atrial fibrillation episodes and does not feel that Holter monitoring would be helpful.      Normal mammography done this year not due until February--- she will be due for a Pap in 2019, last Pap and HPV screening normal in 2014 with normal history  She declines colonoscopy screening--as her  had perforated colon from procedure    Reports a history last week of vertigo lasted seconds when she got up from a chair to standing position that was an isolated event however she wanted to be sure this was not because from her atrial fibrillation    So reports right-sided neck pain feels like it locks and catches sometimes  In reviewing past notes she had an enlarged parotid gland on the right side with neck CT that was completed in 2017  There is no tobacco history  No history of hypertension--she reports hypertension on her medical record was in correct      Review of Systems   Neurological: Positive for dizziness.   All other systems reviewed and are negative.     All other systems reviewed and negative.     MARIO:   No flowsheet data found.  PHQ9:  PHQ-9 SCORE 5/27/2016   PHQ-9 Total Score 0       I have personally reviewed the past medical history, past surgical history, medications, allergies, family and social history as listed below, on 12/20/2018.    Allergies   Allergen Reactions     Cephalexin Itching     pt wrote \"redness irritation, inflamation , pus at point of incision\"  questionable allergy     Cyclobenzaprine      Other reaction(s): Dizziness  Lightheadedness. Patient unsure if this is true.      Sulfa Drugs      Other reaction(s): *Unknown - Childhood Rxn     Sulfacetamide Unknown     Vitamin B12 Unknown     Vitamin B1 & B12  [Trophite] Rash     Sublingual vitamin       Current Outpatient Medications   Medication Sig Dispense Refill     aspirin (ASA) 81 MG EC tablet Take 1 tablet (81 mg) by mouth daily 90 tablet " 3     diltiazem (CARDIZEM) 60 MG tablet Take 1 tablet up to three times daily as needed for arrhythmia recurrence.          Patient Active Problem List    Diagnosis Date Noted     Acne rosacea 01/16/2018     Priority: Medium     Displacement of intervertebral disc 01/16/2018     Priority: Medium     Overview:   L5-S1       Weight gain 01/16/2018     Priority: Medium     Dyslipidemia 07/19/2016     Priority: Medium     Angina pectoris (H) 05/19/2016     Priority: Medium     Dizzy spells 05/13/2016     Priority: Medium     Elevated BP without diagnosis of hypertension 05/13/2016     Priority: Medium     PAF (paroxysmal atrial fibrillation) (H) 05/13/2016     Priority: Medium     Postmenopausal 07/17/2014     Priority: Medium     Pityriasis lichenoides chronica 07/05/2011     Priority: Medium     Other tear of cartilage or meniscus of knee, current 09/13/2010     Priority: Medium     Overview:   R knee; chronic mild problems       Past Medical History:   Diagnosis Date     Asymptomatic menopausal state     7/17/2014     Personal history of other medical treatment (CODE)     L5-S1     Personal history of other medical treatment (CODE)     9/13/2010,R knee; chronic mild problems     Past Surgical History:   Procedure Laterality Date     ARTHROTOMY WRIST      1985,Surgery for De Quervain's tenosynovitis right wrist about 1985     FINGER SURGERY      03/06,Ulnar collateral ligament repair left thumb, Dr. Joy     OTHER SURGICAL HISTORY      2006,835649,OTHER,Left,thumb fracture     OTHER SURGICAL HISTORY      1985 right & 2006 left,[id number],OTHER     OTHER SURGICAL HISTORY      01/4/02,16697.0,SKIN/SUBCUTANEOUS SURGERY,Excision of an intradermal nevus of the preauricular area     Social History     Socioeconomic History     Marital status:      Spouse name: None     Number of children: None     Years of education: None     Highest education level: None   Social Needs     Financial resource strain: None     Food  "insecurity - worry: None     Food insecurity - inability: None     Transportation needs - medical: None     Transportation needs - non-medical: None   Occupational History     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  Cullen Spouse   Children   Vinnie born 1985,   Tony born 1989.     Family History   Problem Relation Age of Onset     Other - See Comments Mother 70        Alzheimer's     Prostate Cancer Father         Cancer-prostate     Diabetes Father         Diabetes     Other - See Comments Father         GI Disease,colon polyps     Heart Disease Father         Heart Disease,CAD     Cancer Paternal Grandmother         Cancer,eye cancer, type unknown     Other - See Comments No family hx of         Stroke,No FHx of CAD     Breast Cancer No family hx of         Cancer-breast       EXAM:   Vitals:    12/20/18 1039   BP: 130/70   BP Location: Right arm   Patient Position: Sitting   Cuff Size: Adult Large   Pulse: 60   Temp: 96.7  F (35.9  C)   TempSrc: Temporal   Weight: 87.3 kg (192 lb 6.4 oz)   Height: 1.708 m (5' 7.25\")       Current Pain Score: No Pain (0)     BP Readings from Last 3 Encounters:   12/20/18 130/70   02/14/17 126/70   02/02/17 110/76      Wt Readings from Last 3 Encounters:   12/20/18 87.3 kg (192 lb 6.4 oz)   02/02/17 88.5 kg (195 lb)   04/06/16 94.8 kg (209 lb)      Estimated body mass index is 29.91 kg/m  as calculated from the following:    Height as of this encounter: 1.708 m (5' 7.25\").    Weight as of this encounter: 87.3 kg (192 lb 6.4 oz).     Physical Exam   Constitutional: She is oriented to person, " place, and time. She appears well-developed and well-nourished.   HENT:   Head: Normocephalic and atraumatic.   Right Ear: External ear normal.   Left Ear: External ear normal.   Mouth/Throat: Oropharynx is clear and moist.   Eyes: Conjunctivae and EOM are normal. Pupils are equal, round, and reactive to light.   Neck: Normal range of motion. Neck supple.   Cardiovascular: Normal rate and regular rhythm.   Pulmonary/Chest: Effort normal and breath sounds normal.   Musculoskeletal: Normal range of motion.   Lymphadenopathy:     She has no cervical adenopathy.   Neurological: She is alert and oriented to person, place, and time.   Skin: Skin is warm and dry.   Psychiatric: She has a normal mood and affect. Her behavior is normal. Judgment and thought content normal.   Nursing note and vitals reviewed.       INVESTIGATIONS:  Results for orders placed or performed in visit on 12/20/18   Comprehensive Metabolic Panel   Result Value Ref Range    Sodium 142 134 - 144 mmol/L    Potassium 3.7 3.5 - 5.1 mmol/L    Chloride 105 98 - 107 mmol/L    Carbon Dioxide 31 21 - 31 mmol/L    Anion Gap 6 3 - 14 mmol/L    Glucose 100 70 - 105 mg/dL    Urea Nitrogen 14 7 - 25 mg/dL    Creatinine 0.77 0.60 - 1.20 mg/dL    GFR Estimate 76 >60 mL/min/[1.73_m2]    GFR Estimate If Black >90 >60 mL/min/[1.73_m2]    Calcium 9.3 8.6 - 10.3 mg/dL    Bilirubin Total 0.4 0.3 - 1.0 mg/dL    Albumin 4.1 3.5 - 5.7 g/dL    Protein Total 7.1 6.4 - 8.9 g/dL    Alkaline Phosphatase 91 34 - 104 U/L    ALT 18 7 - 52 U/L    AST 17 13 - 39 U/L   Lipid Panel   Result Value Ref Range    Cholesterol 227 (H) <200 mg/dL    Triglycerides 69 <150 mg/dL    HDL Cholesterol 72 23 - 92 mg/dL    LDL Cholesterol Calculated 141 (H) <100 mg/dL    Non HDL Cholesterol 155 (H) <130 mg/dL       ASSESSMENT AND PLAN:  Problem List Items Addressed This Visit        Circulatory    PAF (paroxysmal atrial fibrillation) (H) - Primary    Relevant Medications    aspirin (ASA) 81 MG EC  tablet    Other Relevant Orders    CARDIOLOGY EVAL ADULT REFERRAL    Lipid Panel (Completed)    Comprehensive Metabolic Panel (Completed)      Other Visit Diagnoses     Neck pain        Relevant Orders    CT Soft Tissue Neck w Contrast    Enlarged lymph node        Encounter for screening and preventative care            --try to discuss there may be secondary dysrhythmias and dysrhythmias she may not be aware of--- patient feels that she is aware of all changes in heart rhythm and in tune to her body--- she does agree to follow-up with cardiology  Referral was made to see Dr. Parmar  She is agreeable with taking a low-dose baby aspirin--chads vascular score 1    #2--preventive screenings--due for mammography in February 2018, declines screening colonoscopy    #3--right-sided neck pain feels like it catches and locks--- in reviewing neck CT done last year right-sided enlarged lymph node--  Clinical follow-up was recommended--- with  this history would recheck neck CT to determine any soft tissue changes            -- Expected clinical course discussed    -- Medications and their side effects discussed    Patient Instructions   Start low dose baby aspirin    See Dr parmar about your atrial fib followup     You will call to schedule CT    Rosa Conde [facility]     Portions of this note were dictated using speech recognition software. The note has been proofread but errors in the text may have been overlooked. Please contact me if there are any concerns regarding the accuracy of the dictation.

## 2018-12-20 NOTE — LETTER
December 26, 2018      Felicitas Rojas  [address] [facility] [address]        Dear ,    Please see enclosed lab results, labs show normal indicators of kidney and liver function  Your cholesterol is a little above target range, with your atrial fibrillation history   I will defer this discussion to Dr. Parmar as you will be seeing him soon on recommendations for preventative treatment and any further updated diagnostics that he feels as indicated.  I would continue to  work on lifestyle measures with diet and exercise at this time.      Resulted Orders   Comprehensive Metabolic Panel   Result Value Ref Range    Sodium 142 134 - 144 mmol/L    Potassium 3.7 3.5 - 5.1 mmol/L    Chloride 105 98 - 107 mmol/L    Carbon Dioxide 31 21 - 31 mmol/L    Anion Gap 6 3 - 14 mmol/L    Glucose 100 70 - 105 mg/dL    Urea Nitrogen 14 7 - 25 mg/dL    Creatinine 0.77 0.60 - 1.20 mg/dL    GFR Estimate 76 >60 mL/min/[1.73_m2]    GFR Estimate If Black >90 >60 mL/min/[1.73_m2]    Calcium 9.3 8.6 - 10.3 mg/dL    Bilirubin Total 0.4 0.3 - 1.0 mg/dL    Albumin 4.1 3.5 - 5.7 g/dL    Protein Total 7.1 6.4 - 8.9 g/dL    Alkaline Phosphatase 91 34 - 104 U/L    ALT 18 7 - 52 U/L    AST 17 13 - 39 U/L   Lipid Panel   Result Value Ref Range    Cholesterol 227 (H) <200 mg/dL    Triglycerides 69 <150 mg/dL    HDL Cholesterol 72 23 - 92 mg/dL    LDL Cholesterol Calculated 141 (H) <100 mg/dL      Comment:      Above desirable:  100-129 mg/dl  Borderline High:  130-159 mg/dL  High:             160-189 mg/dL  Very high:       >189 mg/dl      Non HDL Cholesterol 155 (H) <130 mg/dL      Comment:      Above Desirable:  130-159 mg/dl  Borderline high:  160-189 mg/dl  High:             190-219 mg/dl  Very high:       >219 mg/dl         If you have any questions or concerns, please call the clinic at the number listed above.       Sincerely,        LATASHA Holguin CNP

## 2018-12-20 NOTE — PATIENT INSTRUCTIONS
Start low dose baby aspirin    See Dr mercado about your atrial fib followup     You will call to schedule CT

## 2019-01-17 ENCOUNTER — OFFICE VISIT (OUTPATIENT)
Dept: CARDIOLOGY | Facility: OTHER | Age: 59
End: 2019-01-17
Attending: NURSE PRACTITIONER
Payer: COMMERCIAL

## 2019-01-17 VITALS
DIASTOLIC BLOOD PRESSURE: 80 MMHG | WEIGHT: 189 LBS | BODY MASS INDEX: 29.66 KG/M2 | HEART RATE: 68 BPM | SYSTOLIC BLOOD PRESSURE: 118 MMHG | HEIGHT: 67 IN

## 2019-01-17 DIAGNOSIS — I49.1 PAC (PREMATURE ATRIAL CONTRACTION): ICD-10-CM

## 2019-01-17 DIAGNOSIS — R03.0 ELEVATED BP WITHOUT DIAGNOSIS OF HYPERTENSION: ICD-10-CM

## 2019-01-17 DIAGNOSIS — I49.3 VENTRICULAR ECTOPY: ICD-10-CM

## 2019-01-17 DIAGNOSIS — R00.2 PALPITATIONS: ICD-10-CM

## 2019-01-17 DIAGNOSIS — E78.2 MIXED HYPERLIPIDEMIA: Primary | ICD-10-CM

## 2019-01-17 DIAGNOSIS — I48.0 PAF (PAROXYSMAL ATRIAL FIBRILLATION) (H): ICD-10-CM

## 2019-01-17 PROCEDURE — 93000 ELECTROCARDIOGRAM COMPLETE: CPT | Performed by: INTERNAL MEDICINE

## 2019-01-17 PROCEDURE — 99205 OFFICE O/P NEW HI 60 MIN: CPT | Performed by: INTERNAL MEDICINE

## 2019-01-17 RX ORDER — ASPIRIN 81 MG/1
81 TABLET, CHEWABLE ORAL DAILY
Qty: 90 TABLET | Refills: 3 | Status: SHIPPED | OUTPATIENT
Start: 2019-01-17 | End: 2020-01-17

## 2019-01-17 RX ORDER — DILTIAZEM HCL 60 MG
60 TABLET ORAL 4 TIMES DAILY PRN
Qty: 30 TABLET | Refills: 1
Start: 2019-01-17 | End: 2019-12-16

## 2019-01-17 ASSESSMENT — PAIN SCALES - GENERAL: PAINLEVEL: NO PAIN (0)

## 2019-01-17 ASSESSMENT — MIFFLIN-ST. JEOR: SCORE: 1465.05

## 2019-01-17 NOTE — PROGRESS NOTES
[facility]   CARDIOLOGY CONSULT     Felicitas Rojas   1960  [id number]    No Ref-Primary, Physician     Chief Complaint   Patient presents with     Consult For     paroxysmal atrial fib          HPI:   Mrs. Rojas is a 58-year-old female who is being seen by cardiology for infrequent palpitations presumably related to paroxysmal atrial fibrillation.  She also has history of hyperlipidemia and elevated blood pressures without a diagnosis of hypertension, PACs, VE, and palpitations.    She has been having a fluttering in her chest since high school.  These palpitations have been mild.  She has seen Dr. Troy initially on 5/19/16 after reporting to the ER for persistent palpitations with retrosternal chest pain.  A. fib was documented with on and EKG.  She was briefly admitted to the hospital.  Her CHADSVASC score is 0 and it was suggest that she take aspirin 81 mg daily and diltiazem 60 mg as needed for palpitations.  She has been reluctant to take aspirin and diltiazem secondary to the side effects.  She has been researching foods and triggers for atrial fibrillation and feels that the gas fumes from her lawn more, MSG, caffeine, aspartame, ice cream, carbs, fasting, a lack of sleep amongst other things seem to trigger atrial fibrillation.  She is rather certain when she is in atrial fibrillation as she has symptoms.  She has an EKG from 5/10/16 that supports atrial fibrillation.  She had a Zio patch completed on 7/1/16 that was negative for atrial fibrillation.  She was identified as having atrial flutter on 4/3/16.  She has since taken herself off aspirin secondary to her potential side effects and has had to use diltiazem 60 mg short acting minimally which she thinks she has had minimal benefit.  She has been hesitant to consider an ablation, use medications, antiarrhythmics, blood thinning medications and most certainly Coumadin.  She is aware that as she gets older, she will need more aggressive  anticoagulation than aspirin which would include a NOAC or Coumadin.    We had a long discussion about atrial fibrillation today.  The heart model in the room was used to explain what atrial fibrillation and where ablations are performed.  She has more understanding of the treatment options and is wishing to follow-up with electrophysiology.  It was suggest that she see Dr. Villegas via telemedicine rather than traveling to the [facility] and being seen by EP through [facility].    IMAGING RESULTS:   Notes Recorded by Maria G Marquez RN on 2016 at 2:55 PM Patient was given results via phone, see phone note. Maria G Marquez RN ....................  2016   2:55 PM  ------  Notes Recorded by Emiliano Martinez MD on 2016 at 11:02 PM Zio Patch   Indication: 56-year-old female with history of paroxysmal atrial fibrillation Analysis Time  Maximum heart rate 140 bpm, minimum 45 BPM, average 69 BPM. Predominant rhythm was sinus. One run of SVT occurred lasting 5 beats, maximum rate of 133 BPM.   Isolated SVE's were rare, SVE couplets were rare, SVE triplets were rare. Isolated VE were rare, VE triplets were rare, no VE couplets. Ventricular bigeminy was present.  88 patient triggered events were noted.  Findings showed sinus rhythm,   Supraventricular ectopic beats, Ventricular ectopic beats.  50 diary entries, Findings showed sinus rhythm, Supraventricular ectopic beats, Ventricular ectopic beats.    Impression:  Predominant underlying rhythm was sinus.   No significant tachycardia   or bradycardia arrhythmias noted.  Emiliano Martinez MD      STRESS ECHOCARDIOGRAM      GLYNN BAUTISTA  MRN:    [id number]         Accession#:   [id number]  :    1960 56 years Study Date:   2016 8:06:03 AM  Gender: F                  BP:           138/84 mmHg  Height: 172.72 cm          BSA:          2.07 m?  Weight: 93.44 kg           Tech:         [facility]                             Referring MD: MARIBETH FAUSTIN  Site:          "    [facility]  Reading Location: [facility]      Procedure: Stress Echo. Mushtaq stress echo.    Indication for study: AFIB W/ RVR, ANGINA  Cardiac Rhythm: Regular and normal sinus.Study quality:    Final Impressions:   1. Negative stress echo for ischemia.   2. Left ventricular ejection fraction at rest was 55%. With stress, ejection fraction increased to 60% with appropriate decrease in end systolic volume. No regional wall motion abnormalities at rest or with peak stress.   3. The patient exercised for 7 minutes 0 seconds on a standard Mushtaq protocol. Maximum stress test with 104.5% of age predicted maximum heart rate achieved. Double product 35,828.   4. During stress exam the patient developed no significant symptoms.   5. See separate report for EKG interpretation.   6. Please see report from 4/18/2016 for resting echo interpretation.    ALLERGIES:   Allergies   Allergen Reactions     Cephalexin Itching     pt wrote \"redness irritation, inflamation , pus at point of incision\"  questionable allergy     Cyclobenzaprine      Other reaction(s): Dizziness  Lightheadedness. Patient unsure if this is true.      Sulfa Drugs      Other reaction(s): *Unknown - Childhood Rxn     Sulfacetamide Unknown     Vitamin B12 Unknown     Ambien [Zolpidem]      Caused A-fib.      Vitamin B1 & B12  [Trophite] Rash     Sublingual vitamin        PAST MEDICAL HISTORY:   Past Medical History:   Diagnosis Date     Asymptomatic menopausal state     7/17/2014     Personal history of other medical treatment (CODE)     L5-S1     Personal history of other medical treatment (CODE)     9/13/2010,R knee; chronic mild problems        PAST SURGICAL HISTORY:   Past Surgical History:   Procedure Laterality Date     ARTHROTOMY WRIST      1985,Surgery for De Quervain's tenosynovitis right wrist about 1985     FINGER SURGERY      03/06,Ulnar collateral ligament repair left thumb, Dr. Joy     OTHER SURGICAL HISTORY      " 2006,[id number],OTHER,Left,thumb fracture     OTHER SURGICAL HISTORY      1985 right & 2006 left,769026,OTHER     OTHER SURGICAL HISTORY      01/4/02,90682.0,SKIN/SUBCUTANEOUS SURGERY,Excision of an intradermal nevus of the preauricular area        FAMILY HISTORY:   Family History   Problem Relation Age of Onset     Other - See Comments Mother 70        Alzheimer's     Prostate Cancer Father         Cancer-prostate     Diabetes Father         Diabetes     Other - See Comments Father         GI Disease,colon polyps     Heart Disease Father         Heart Disease,CAD     Cancer Paternal Grandmother         Cancer,eye cancer, type unknown     Other - See Comments No family hx of         Stroke,No FHx of CAD     Breast Cancer No family hx of         Cancer-breast        SOCIAL HISTORY:   Social History     Socioeconomic History     Marital status:      Spouse name: None     Number of children: None     Years of education: None     Highest education level: None   Social Needs     Financial resource strain: None     Food insecurity - worry: None     Food insecurity - inability: None     Transportation needs - medical: None     Transportation needs - non-medical: None   Occupational History     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  Cullen Spouse   Children   Vinnie born 1985,   Tony born 1989.         CURRENT MEDICATIONS:   Prior to Admission medications    Medication Sig Start Date End Date Taking? Authorizing Provider   diltiazem (CARDIZEM) 60 MG tablet Take 1 tablet up to three times daily as needed for arrhythmia recurrence.  10/11/16   Reported, Patient          ROS:   CONSTITUTIONAL: No weight loss, fever, chills, weakness or fatigue.   HEENT: Eyes: No visual changes. Ears, Nose, Throat: No hearing loss, congestion or difficulty swallowing.   CARDIOVASCULAR: No chest pain, chest pressure or chest discomfort.  Occasional palpitations but no lower extremity edema.   RESPIRATORY: No shortness of breath, dyspnea upon exertion, cough or sputum production.   GASTROINTESTINAL: No abdominal pain. No anorexia, nausea, vomiting or diarrhea.   NEUROLOGICAL: No headache, lightheadedness, dizziness, syncope, ataxia or weakness.   HEMATOLOGIC: No anemia, bleeding or bruising.   PSYCHIATRIC: No history of depression or anxiety.   ENDOCRINOLOGIC: No reports of sweating, cold or heat intolerance. No polyuria or polydipsia.   SKIN: No abnormal rashes or itching.       PHYSICAL EXAM:   GENERAL: The patient is a well-developed, well-nourished, in no apparent distress. Alert and oriented x3.   HEENT: Head is normocephalic and atraumatic. Eyes are symmetrical with normal visual tracking.  HEART: Regular rate and rhythm, S1S2 present without murmur, rub or gallop.   LUNGS: Respirations regular and unlabored. Clear to auscultation.   GI: Abdomen is soft and nondistended.   EXTREMITIES: No peripheral edema present.   MUSCULOSKELETAL: No joint swelling.   NEUROLOGIC: Alert and oriented X3.    SKIN: No jaundice. No rashes or visible skin lesions present.         LAB RESULTS:   Office Visit on 12/20/2018   Component Date Value Ref Range Status     Sodium 12/20/2018 142  134 - 144 mmol/L Final     Potassium 12/20/2018 3.7  3.5 - 5.1 mmol/L Final     Chloride 12/20/2018 105  98 - 107 mmol/L Final     Carbon Dioxide 12/20/2018 31  21 - 31 mmol/L Final     Anion Gap 12/20/2018 6  3 - 14 mmol/L Final     Glucose 12/20/2018 100  70 - 105 mg/dL Final     Urea Nitrogen 12/20/2018 14  7 - 25 mg/dL Final     Creatinine 12/20/2018 0.77  0.60 - 1.20 mg/dL Final     GFR  "Estimate 12/20/2018 76  >60 mL/min/[1.73_m2] Final     GFR Estimate If Black 12/20/2018 >90  >60 mL/min/[1.73_m2] Final     Calcium 12/20/2018 9.3  8.6 - 10.3 mg/dL Final     Bilirubin Total 12/20/2018 0.4  0.3 - 1.0 mg/dL Final     Albumin 12/20/2018 4.1  3.5 - 5.7 g/dL Final     Protein Total 12/20/2018 7.1  6.4 - 8.9 g/dL Final     Alkaline Phosphatase 12/20/2018 91  34 - 104 U/L Final     ALT 12/20/2018 18  7 - 52 U/L Final     AST 12/20/2018 17  13 - 39 U/L Final     Cholesterol 12/20/2018 227* <200 mg/dL Final     Triglycerides 12/20/2018 69  <150 mg/dL Final     HDL Cholesterol 12/20/2018 72  23 - 92 mg/dL Final     LDL Cholesterol Calculated 12/20/2018 141* <100 mg/dL Final     Non HDL Cholesterol 12/20/2018 155* <130 mg/dL Final            ASSESSMENT:       ICD-10-CM    1. Mixed hyperlipidemia E78.2    2. PAF (paroxysmal atrial fibrillation) (H) I48.0 EKG 12-lead, tracing only (Same Day)     diltiazem (CARDIZEM) 60 MG tablet     CARDIOLOGY EVAL ADULT REFERRAL     aspirin (ASA) 81 MG chewable tablet   3. Elevated BP without diagnosis of hypertension R03.0    4. PAC (premature atrial contraction) I49.1    5. Ventricular ectopy I49.3          PLAN:   1.  She has a history of paroxysmal atrial fibrillation, previously on 6/8/16 and on 7/19/16.  She had been referred to Dr. Flores through [facility] for consideration for an ablation.  This was offered and she was attempted to be scheduled for an atrial fibrillation ablation but she turned it down secondary to fear of \"scarring her heart\".  She has been following what she eats and has identified a lot of triggers for atrial fibrillation.  She is interested in seeing EP once again for consideration of an ablation.  2.  Her CHADSVASC score is 0 which she would technically not need any blood thinners.  It has been suggested that she take an 81 mg aspirin which she has agreed to.  3.  We did discuss doing a repeat Zio patch versus a loop recorder.  She is " requesting to have a loop recorder placed here in [place].  A referral to general surgery has been placed.  4.  She will continue to take diltiazem 60 mg, 4 times a day as needed for palpitations.  Her prescription is good until February, 2019.  She was offered a refill today but turned this down.  She will call if she chooses to have a refill in the future.   5.  For now, she will be seen in approximately 1 year follow-up or sooner depending on her consult with Dr. Villegas in .      Thank you for allowing me to participate in the care of your patient. Please do not hesitate to contact me if you have any questions.     Aric Parmar, DO

## 2019-01-17 NOTE — NURSING NOTE
"Patient comes in for consult on paroxsymal Afib.  Mea Teague LPN ....................1/17/2019   8:55 AM  Chief Complaint   Patient presents with     Consult For     paroxysmal atrial fib       Initial /80 (BP Location: Right arm, Patient Position: Sitting, Cuff Size: Adult Regular)   Pulse 68   Ht 1.71 m (5' 7.32\")   Wt 85.7 kg (189 lb)   BMI 29.32 kg/m   Estimated body mass index is 29.32 kg/m  as calculated from the following:    Height as of this encounter: 1.71 m (5' 7.32\").    Weight as of this encounter: 85.7 kg (189 lb).  Meds Reconciled: complete  Pt is not on Aspirin  Pt is not on a Statin  PHQ and/or MARIO reviewed. Pt referred to PCP/MH Provider as appropriate.    Mae Teague LPN      "

## 2019-01-17 NOTE — PATIENT INSTRUCTIONS
You were seen by  Aric Parmar, DO      1. Take Aspirin 81 mg daily     2. We will contact you to set up a telemedicine visit with Dr. Villegas from the [facility]    3. Consider having a Zio Patch placed or a Loop recorder implanted    You will follow up with [facility] in 1 one year or dependent on Dr. Villegas visit. Certainly sooner if needed.       Please call the cardiology office with problems, questions, or concerns at [phone].    If you experience chest pain, chest pressure, chest tightness, shortness of breath, fainting, lightheadedness, nausea, vomiting, or other concerning symptoms, please report to the Emergency Department or call 911. These symptoms may be emergent, and best treated in the Emergency Department.     CRISTOPHER WebsterN, RN  [facility]  [phone]

## 2019-01-22 ENCOUNTER — TELEPHONE (OUTPATIENT)
Dept: CARDIOLOGY | Facility: OTHER | Age: 59
End: 2019-01-22

## 2019-01-22 NOTE — TELEPHONE ENCOUNTER
Call back to pt who would like 02/11/2018 at 0800 with Dr. Villegas with an arrival time of 0730. Denisa Ogden RN on 1/22/2019 at 1:58 PM

## 2019-01-22 NOTE — TELEPHONE ENCOUNTER
----- Message from LATASHA Carpenter CNP sent at 1/21/2019  6:24 PM CST -----  Regarding: RE: Telemed consult  2/4 8am  2/5 8am  2/7 8am  2/11 8am  2/12 8am  2/14 flexible times 8am or later  Thanks,  LVW    ----- Message -----  From: Denisa Ogden RN  Sent: 1/17/2019  10:40 AM  To: LATASHA Carpenter CNP  Subject: Telemed consult                                  Another telemed consult for Dr. Villegas if I could get available dates.     Thanks!  Denisa

## 2019-01-23 ENCOUNTER — TELEPHONE (OUTPATIENT)
Dept: SURGERY | Facility: OTHER | Age: 59
End: 2019-01-23

## 2019-01-23 ENCOUNTER — TELEPHONE (OUTPATIENT)
Dept: CARDIOLOGY | Facility: OTHER | Age: 59
End: 2019-01-23

## 2019-01-23 NOTE — TELEPHONE ENCOUNTER
Contacted patient to let her know that Dr. Souza does not do loop recorder procedures.  Would like to have Dr. Matta do this.  Will speak to Dr. Matta to see if he would like to see her prior to procedure.  She was also told that this could be done with a local.  Lynn Wooten LPN..........1/23/2019  11:35 AM

## 2019-01-23 NOTE — TELEPHONE ENCOUNTER
Call to pt per request from surgical team to discuss Loop recorder and answer questions. LMTCB. Denisa Ogden RN on 1/23/2019 at 1:24 PM

## 2019-01-23 NOTE — TELEPHONE ENCOUNTER
Scheduled for procedure with Dr. Matta on 1/31/19.  Will pick surgery handbook up at unit 4 registration.  Lynn Wooten LPN..........1/23/2019  12:58 PM

## 2019-01-23 NOTE — TELEPHONE ENCOUNTER
Call back to pt, questions answered. Directed to [employer] web site for further review. Pt expressed appreciation and will call back with any further questions or concerns. Denisa Ogden RN on 1/23/2019 at 3:21 PM

## 2019-01-23 NOTE — TELEPHONE ENCOUNTER
NAY-pt called wondering if she can get the procedure scheduled now so that she doesn't have to wait too long after the consult for the procedure. Please call pt to advise.  Zhen Borjas on 1/23/2019 at 11:09 AM

## 2019-01-24 ENCOUNTER — TELEPHONE (OUTPATIENT)
Dept: SURGERY | Facility: OTHER | Age: 59
End: 2019-01-24

## 2019-01-24 ENCOUNTER — TELEPHONE (OUTPATIENT)
Dept: CARDIOLOGY | Facility: OTHER | Age: 59
End: 2019-01-24

## 2019-01-24 ENCOUNTER — HOSPITAL ENCOUNTER (OUTPATIENT)
Facility: OTHER | Age: 59
End: 2019-01-24
Attending: SURGERY | Admitting: SURGERY
Payer: COMMERCIAL

## 2019-01-24 NOTE — TELEPHONE ENCOUNTER
Contacted patient.  Surgery folder reviewed over the phone.  Patient will pick-up at unit 4 registration.  Lynn Wooten LPN..........1/24/2019  9:02 AM

## 2019-01-25 NOTE — TELEPHONE ENCOUNTER
Call back to pt who has concerns that she was previously scheduled for an MRI and cancelled but feels she does need it now and is wondering if she can have it with the loop recorder. Review of records shows that CT Soft Tissue Neck was ordered by DEJUAN and cancelled by pt. Confirmed with CT imaging that Loop is compatible with CT. Informed pt that she was not scheduled for MRI but for CT and that it is fine to have done with a loop recorder. Pt also has questions about wireless devices and proximity to Loop recorder. Questions answered. Pt has question about prepping for the procedure. Advised to follow directions in surgery packet. After all questions answered, pt verbalizes understanding and denies further needs at this time. Soft transfer to scheduling. Pt now scheduled for f/u with DEJUAN on 01/31/2019 at 1245.

## 2019-01-28 ENCOUNTER — DOCUMENTATION ONLY (OUTPATIENT)
Dept: CARE COORDINATION | Facility: CLINIC | Age: 59
End: 2019-01-28

## 2019-01-29 ENCOUNTER — TELEPHONE (OUTPATIENT)
Dept: CARDIOLOGY | Facility: OTHER | Age: 59
End: 2019-01-29

## 2019-01-29 NOTE — TELEPHONE ENCOUNTER
Per Day Surgery, pt cancelled Loop recorder implant procedure. Pt scheduled for telemedicine with Dr. Villegas on 02/11/2019. Will address at that time. Denisa Ogden RN on 1/29/2019 at 2:16 PM

## 2019-01-31 ENCOUNTER — TELEPHONE (OUTPATIENT)
Dept: CARDIOLOGY | Facility: OTHER | Age: 59
End: 2019-01-31

## 2019-01-31 NOTE — TELEPHONE ENCOUNTER
Patient called with concerns about getting records sent up and scanned in by the 11th for her appointment.  She would like them ready and available so they have something to discuss.  She is wanting to cancel for the 11th and to have a new date offered.  Please call to discuss a new date and time.  Dary Watts on 1/31/2019 at 2:46 PM

## 2019-01-31 NOTE — TELEPHONE ENCOUNTER
Per call back to pt, she wishes to cancel telemed consult until incoming records received. Pt will notify this writer when she is ready to reschedule. Denisa Ogden RN on 1/31/2019 at 4:37 PM

## 2019-02-08 ENCOUNTER — OFFICE VISIT (OUTPATIENT)
Dept: FAMILY MEDICINE | Facility: OTHER | Age: 59
End: 2019-02-08
Attending: NURSE PRACTITIONER
Payer: COMMERCIAL

## 2019-02-08 VITALS
DIASTOLIC BLOOD PRESSURE: 72 MMHG | WEIGHT: 189 LBS | SYSTOLIC BLOOD PRESSURE: 116 MMHG | BODY MASS INDEX: 29.32 KG/M2 | RESPIRATION RATE: 14 BRPM | HEART RATE: 60 BPM

## 2019-02-08 DIAGNOSIS — K11.1 PAROTID HYPERTROPHY: ICD-10-CM

## 2019-02-08 DIAGNOSIS — M54.2 CERVICALGIA: ICD-10-CM

## 2019-02-08 DIAGNOSIS — L81.9 CHANGING PIGMENTED SKIN LESION: Primary | ICD-10-CM

## 2019-02-08 PROCEDURE — 99214 OFFICE O/P EST MOD 30 MIN: CPT | Performed by: NURSE PRACTITIONER

## 2019-02-08 ASSESSMENT — PAIN SCALES - GENERAL: PAINLEVEL: NO PAIN (0)

## 2019-02-08 NOTE — NURSING NOTE
Patient presents to clinic today for a order change for CT of the neck, need it to be both left and right as the problem is on both sides now. Patient is also concerned about a mole on her forehead, left side, and moles on her back.     No LMP recorded. Patient is postmenopausal.  Medication Reconciliation: complete    Jorje Cárdenas LPN  2/8/2019 10:55 AM

## 2019-02-10 ASSESSMENT — ENCOUNTER SYMPTOMS: COLOR CHANGE: 1

## 2019-02-10 NOTE — PROGRESS NOTES
Nursing Notes:   Jorje Cárdenas LPN  2/8/2019 11:10 AM  Signed  Patient presents to clinic today for a order change for CT of the neck, need it to be both left and right as the problem is on both sides now. Patient is also concerned about a mole on her forehead, left side, and moles on her back.     No LMP recorded. Patient is postmenopausal.  Medication Reconciliation: complete    Jorje Cárdenas LPN  2/8/2019 10:55 AM    Nursing note reviewed with patient.  Accurracy and completeness verified.   Ms. Rojas is a 59 year old female who:  Patient presents with:  Derm Problem: Moles      ICD-10-CM    1. Changing pigmented skin lesion L81.9 GENERAL SURG ADULT REFERRAL   2. Cervicalgia M54.2    3. Parotid hypertrophy K11.1      HPI     Presents for follow-up on a couple of issues--questions about cervical soft tissue CT--- had ordered this for  Persistent feeling of cervical joint locking and catching with turning her head from side to side  Also enlarged parotid gland and submental gland--- her question was wondering if it addressed her entire neck or just the right side  Her symptoms are unchanged--there is been no injury      The other issue is she has noticed a dark skin lesion left forehead near her scalp line that she reports appeared suddenly  Has become darker--she does not feel it is gotten larger--wondering if that should be biopsied she denies any personal or family history of previous  Melanotic or any melanotic lesions  The lesion has become a concern  She does try to avoid if not limit sun exposure  Has several large stuck on appearing lesions on her back that she would like checked--they are not itchy or painful      Review of Systems   Skin: Positive for color change.   All other systems reviewed and are negative.     All other systems reviewed and negative.     MARIO:   No flowsheet data found.  PHQ9:  PHQ-9 SCORE 5/27/2016   PHQ-9 Total Score 0       I have personally reviewed the past medical history, past  "surgical history, medications, allergies, family and social history as listed below, on 2/10/2019.    Allergies   Allergen Reactions     Cephalexin Itching     pt wrote \"redness irritation, inflamation , pus at point of incision\"  questionable allergy     Cyclobenzaprine      Other reaction(s): Dizziness  Lightheadedness. Patient unsure if this is true.      Sulfa Drugs      Other reaction(s): *Unknown - Childhood Rxn     Sulfacetamide Unknown     Vitamin B12 Unknown     Ambien [Zolpidem]      Caused A-fib.      Vitamin B1 & B12  [Trophite] Rash     Sublingual vitamin       Current Outpatient Medications   Medication Sig Dispense Refill     aspirin (ASA) 81 MG chewable tablet Take 1 tablet (81 mg) by mouth daily 90 tablet 3     diltiazem (CARDIZEM) 60 MG tablet Take 1 tablet (60 mg) by mouth 4 times daily as needed (palpitations) 30 tablet 1        Patient Active Problem List    Diagnosis Date Noted     Ventricular ectopy 01/17/2019     Priority: Medium     PAC (premature atrial contraction) 01/17/2019     Priority: Medium     Palpitations 01/17/2019     Priority: Medium     Acne rosacea 01/16/2018     Priority: Medium     Displacement of intervertebral disc 01/16/2018     Priority: Medium     Overview:   L5-S1       Weight gain 01/16/2018     Priority: Medium     Mixed hyperlipidemia 07/19/2016     Priority: Medium     Atypical chest pain 05/19/2016     Priority: Medium     Dizzy spells 05/13/2016     Priority: Medium     Elevated BP without diagnosis of hypertension 05/13/2016     Priority: Medium     PAF (paroxysmal atrial fibrillation) (H) 05/13/2016     Priority: Medium     Postmenopausal 07/17/2014     Priority: Medium     Pityriasis lichenoides chronica 07/05/2011     Priority: Medium     Other tear of cartilage or meniscus of knee, current 09/13/2010     Priority: Medium     Overview:   R knee; chronic mild problems       Past Medical History:   Diagnosis Date     Asymptomatic menopausal state     7/17/2014 "     Personal history of other medical treatment (CODE)     L5-S1     Personal history of other medical treatment (CODE)     9/13/2010,R knee; chronic mild problems     Past Surgical History:   Procedure Laterality Date     ARTHROTOMY WRIST      1985,Surgery for De Quervain's tenosynovitis right wrist about 1985     FINGER SURGERY      03/06,Ulnar collateral ligament repair left thumb, Dr. Joy     OTHER SURGICAL HISTORY      2006,[id number],OTHER,Left,thumb fracture     OTHER SURGICAL HISTORY      1985 right & 2006 left,[id number],OTHER     OTHER SURGICAL HISTORY      01/4/02,92722.0,SKIN/SUBCUTANEOUS SURGERY,Excision of an intradermal nevus of the preauricular area     Social History     Socioeconomic History     Marital status:      Spouse name: None     Number of children: None     Years of education: None     Highest education level: None   Social Needs     Financial resource strain: None     Food insecurity - worry: None     Food insecurity - inability: None     Transportation needs - medical: None     Transportation needs - non-medical: None   Occupational History     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  Cullen Spouse   Children   Vinnie born 1985,   Tony born 1989.     Family History   Problem Relation Age of Onset     Other - See Comments Mother 70        Alzheimer's     Prostate Cancer Father         Cancer-prostate     Diabetes Father         Diabetes     Other - See Comments Father         GI Disease,colon polyps     Heart Disease Father         Heart Disease,CAD     Cancer Paternal Grandmother         Cancer,eye cancer, type  "unknown     Other - See Comments No family hx of         Stroke,No FHx of CAD     Breast Cancer No family hx of         Cancer-breast       EXAM:   Vitals:    02/08/19 1058   BP: 116/72   BP Location: Right arm   Patient Position: Sitting   Cuff Size: Adult Large   Pulse: 60   Resp: 14   Weight: 85.7 kg (189 lb)       Current Pain Score: No Pain (0)     BP Readings from Last 3 Encounters:   02/08/19 116/72   01/17/19 118/80   12/20/18 130/70      Wt Readings from Last 3 Encounters:   02/08/19 85.7 kg (189 lb)   01/17/19 85.7 kg (189 lb)   12/20/18 87.3 kg (192 lb 6.4 oz)      Estimated body mass index is 29.32 kg/m  as calculated from the following:    Height as of 1/17/19: 1.71 m (5' 7.32\").    Weight as of this encounter: 85.7 kg (189 lb).     Physical Exam   Constitutional: She is oriented to person, place, and time. She appears well-developed and well-nourished.   HENT:   Head: Normocephalic and atraumatic.   Right parotid mildly enlarged, nontender  No erythema or warmth  No other palpable neck masses  Cervical range of motion intact without pain or limitations   Cardiovascular: Normal rate.   Pulmonary/Chest: Effort normal.   Musculoskeletal: Normal range of motion.   Neurological: She is alert and oriented to person, place, and time.   Skin: Skin is warm and dry.   Left side of forehead above temporal arch near scalp  Dark black  \"c\" shaped lesion  There is no surrounding edema or erythema  It is nontender with palpation  Color is different from other moles    On her upper back posteriorly there are several moles light brown similar in appearance  Has 3 SK appearing stuck on light brown with dry surface lesions about 1-2 cm above bra line  No erythema, no drainage, no pustules  Nontender or bothersome   Psychiatric: She has a normal mood and affect. Her behavior is normal. Judgment and thought content normal.   Nursing note and vitals reviewed.       INVESTIGATIONS:  Results for orders placed or performed in " visit on 01/17/19   EKG 12-lead, tracing only (Same Day)    Narrative    EKG Interpretation:   Rhythm: Sinus  Rate: 57 bpm  Axis: Normal  Conduction: Normal  QRS: Normal  ST Segments: Normal  T-wave: Normal  PAC's Present: No  PVC's Present: No  QTc: 424 ms    Impression:   Sinus bradycardia at 57 bpm.    Aric Parmar, DO   1/17/2019 at 6:50 PM           ASSESSMENT AND PLAN:  Problem List Items Addressed This Visit     None      Visit Diagnoses     Changing pigmented skin lesion    -  Primary    Relevant Orders    GENERAL SURG ADULT REFERRAL    Cervicalgia        Parotid hypertrophy            We discussed the neck soft tissue CT will encompass entire area including cervical vertebrae--this was scheduled      Facial lesion becoming darker different from other lesions with concerning features of irregularity about 6 mm area  Will refer to general surgeon for excision/identification--- patient has an area on her posterior back from a previous lesion excision that was benign  Keloid scar--          -- Expected clinical course discussed    -- Medications and their side effects discussed    There are no Patient Instructions on file for this visit.  Rosa Conde CNP  [facility]     Portions of this note were dictated using speech recognition software. The note has been proofread but errors in the text may have been overlooked. Please contact me if there are any concerns regarding the accuracy of the dictation.

## 2019-02-12 ENCOUNTER — OFFICE VISIT (OUTPATIENT)
Dept: SURGERY | Facility: OTHER | Age: 59
End: 2019-02-12
Attending: NURSE PRACTITIONER
Payer: COMMERCIAL

## 2019-02-12 VITALS
DIASTOLIC BLOOD PRESSURE: 74 MMHG | BODY MASS INDEX: 29.66 KG/M2 | RESPIRATION RATE: 18 BRPM | HEIGHT: 67 IN | SYSTOLIC BLOOD PRESSURE: 116 MMHG | WEIGHT: 189 LBS | HEART RATE: 66 BPM

## 2019-02-12 DIAGNOSIS — L81.9 CHANGING PIGMENTED SKIN LESION: Primary | ICD-10-CM

## 2019-02-12 PROCEDURE — 11642 EXC F/E/E/N/L MAL+MRG 1.1-2: CPT | Performed by: SURGERY

## 2019-02-12 PROCEDURE — 88305 TISSUE EXAM BY PATHOLOGIST: CPT

## 2019-02-12 ASSESSMENT — PAIN SCALES - GENERAL: PAINLEVEL: NO PAIN (0)

## 2019-02-12 ASSESSMENT — MIFFLIN-ST. JEOR: SCORE: 1454.93

## 2019-02-12 NOTE — PATIENT INSTRUCTIONS
Your incision was closed with stitches that will dissolve.     It is ok to get the incision wet in the shower on the day after your procedure.     Don't soak in a tub, pool or lake for 5 days.  If you have concerns, please call. Call with increased redness, drainage or fever.

## 2019-02-12 NOTE — NURSING NOTE
"Chief Complaint   Patient presents with     Derm Problem     lesion on upper left side of scalp       Initial /74 (BP Location: Left arm, Patient Position: Sitting, Cuff Size: Adult Large)   Pulse 66   Resp 18   Ht 1.702 m (5' 7\")   Wt 85.7 kg (189 lb)   Breastfeeding? No   BMI 29.60 kg/m   Estimated body mass index is 29.6 kg/m  as calculated from the following:    Height as of this encounter: 1.702 m (5' 7\").    Weight as of this encounter: 85.7 kg (189 lb).  Medication Reconciliation: complete    Lynn Wooten LPN     TIMEOUT  [facility] Protocol    A. Pre-procedure verification complete     1-relevant information / documentation available, reviewed and properly matched to the patient; 2-consent accurate and complete, 3-equipment and supplies available    B. Site marking complete     Site marked if not in continuous attendance with patient    C. TIME OUT completed     Time Out was conducted just prior to starting procedure to verify the eight required elements: 1-patient identity, 2-consent accurate and complete, 3-position, 4-correct side/site marked (if applicable), 5-procedure, 6-relevant images / results properly labeled and displayed (if applicable), 7-antibiotics / irrigation fluids (if applicable), 8-safety precautions.  "

## 2019-02-12 NOTE — NURSING NOTE
"Chief Complaint   Patient presents with     Derm Problem     lesion on upper left side of scalp       Initial /74 (BP Location: Left arm, Patient Position: Sitting, Cuff Size: Adult Large)   Pulse 66   Resp 18   Ht 1.702 m (5' 7\")   Wt 85.7 kg (189 lb)   Breastfeeding? No   BMI 29.60 kg/m   Estimated body mass index is 29.6 kg/m  as calculated from the following:    Height as of this encounter: 1.702 m (5' 7\").    Weight as of this encounter: 85.7 kg (189 lb).  Medication Reconciliation: complete    Lynn Wooten LPN  "

## 2019-02-12 NOTE — PROGRESS NOTES
Procedure Note     Pre/Post Operative Diagnosis:   Left temple lesion     Procedure:    Excision of Left temple lesion      Surgeon: DARWIN Matta MD     Local Anesthesia: 1% lidocaine with0.25%Marcaine with epinephrine    Indication for the procedure:    This is a 59 year old female patient with Left temple lesion that has been changing in appearance in the past few months.  Patient denies personal history of skin cancer.  She denies family history of melanoma or having lots of sunburns as a child.  Nowadays when she is out of the sun she does protect her skin with sunscreen.  After explaining the risks to include bleeding, infection, recurrence or need for re-excision, and scarring the patient wished to proceed.    Procedure:   The left forehead was prepped and draped in usual sterile fashion with ChloraPrep. After adequate local anesthesia, an elliptical skin incision was made to encompass the lesion, 1.3cm x 0.6 cm with margins. The skin was closed with 4-0 monocryl suture in a running fashion.  Dermabond was applied.     Plan:  The patient will be called with pathology results.  Patient will followup if there any problems with the wound including redness or drainage.      DARWIN Matta MD

## 2019-02-15 ENCOUNTER — HOSPITAL ENCOUNTER (OUTPATIENT)
Dept: CT IMAGING | Facility: OTHER | Age: 59
Discharge: HOME | End: 2019-02-15
Attending: NURSE PRACTITIONER | Admitting: NURSE PRACTITIONER
Payer: COMMERCIAL

## 2019-02-15 DIAGNOSIS — M54.2 NECK PAIN: ICD-10-CM

## 2019-02-15 PROCEDURE — 70491 CT SOFT TISSUE NECK W/DYE: CPT

## 2019-02-15 PROCEDURE — 25500064 ZZH RX 255 OP 636: Performed by: NURSE PRACTITIONER

## 2019-02-15 RX ADMIN — IOHEXOL 100 ML: 350 INJECTION, SOLUTION INTRAVENOUS at 16:37

## 2019-02-15 NOTE — LETTER
"February 16, 2019      Felicitas Rojas  [address]  [facility] [address]        Dear ,    Please see enclosed copy of final neck imaging report  Showing no evidence of masses or abnormalities in your lymph nodes  And degenerative changes or arthritis in your cervical spine.    I would suggest trying either chiropractic therapy or physical therapy  For your neck range of motion and sensation of \"catching\" symptoms.  If you need a referral for physical therapy please call and I will send an electronic referral for you.        Resulted Orders   CT Soft Tissue Neck w Contrast    Narrative    PROCEDURE: CT SOFT TISSUE NECK W CONTRAST 2/15/2019 4:36 PM    HISTORY: Neck mass, multiple; followup right sided lymph nod and has  new feeling of something catching right lateral neck; Neck pain    COMPARISONS: February 14, 2017    Meds/Dose Given: omnipaque 350 100 ml    TECHNIQUE: CT scan of the neck with IV contrast    FINDINGS: Paranasal sinuses are clear. The parotid glands appear  normal. The submandibular glands are normal. The larynx and upper  trachea is normal. The thyroid gland is normal. The cervical lymph  nodes are normal in caliber. The upper mediastinal lymph nodes appear  normal. Degenerative changes are present in the cervical spine         Impression    IMPRESSION: No change in the appearance the neck from previous  examination in February 2017    RIKA BANKS MD       If you have any questions or concerns, please call the clinic at the number listed above.       Sincerely,        [facility]                "

## 2019-02-15 NOTE — PROGRESS NOTES
1.  Has the patient had a previous reaction to IV contrast? no    2.  Does the patient have kidney disease?  no    3.  Is the patient on dialysis? no    If YES to any of these questions, exam will be reviewed with a Radiologist before administering contrast.

## 2019-02-20 ENCOUNTER — TELEPHONE (OUTPATIENT)
Dept: CARDIOLOGY | Facility: OTHER | Age: 59
End: 2019-02-20

## 2019-02-20 NOTE — TELEPHONE ENCOUNTER
"Pt. Calling about records that were sent to our facility.  States \"I would like to talk to Denisa to see what the next step is.\"  "

## 2019-02-20 NOTE — TELEPHONE ENCOUNTER
Call back to pt who is now agreeable to seeing Dr. Villegas via telemedicine. Will reach out to EP for available dates. Pt verbalizes understanding and will await call back from this writer. Denisa Ogden RN on 2/20/2019 at 1:45 PM

## 2019-02-21 NOTE — TELEPHONE ENCOUNTER
Call back to pt to offer date of 03/08/2019 with an arrival time of 0900 for a 0930 appt. LMTCB. Denisa Ogden RN on 2/21/2019 at 9:03 AM

## 2019-02-22 NOTE — TELEPHONE ENCOUNTER
Call back to pt who is agreeable to telemed consult with Dr. Villegas 03/08/19 with an arrival time of 0900 for a 0930 appt. Denisa Ogden RN on 2/22/2019 at 11:02 AM

## 2019-03-08 ENCOUNTER — OFFICE VISIT (OUTPATIENT)
Dept: CARDIOLOGY | Facility: CLINIC | Age: 59
End: 2019-03-08
Attending: INTERNAL MEDICINE
Payer: COMMERCIAL

## 2019-03-08 ENCOUNTER — ALLIED HEALTH/NURSE VISIT (OUTPATIENT)
Dept: CARDIOLOGY | Facility: OTHER | Age: 59
End: 2019-03-08
Attending: INTERNAL MEDICINE
Payer: COMMERCIAL

## 2019-03-08 VITALS
SYSTOLIC BLOOD PRESSURE: 124 MMHG | HEART RATE: 66 BPM | RESPIRATION RATE: 16 BRPM | BODY MASS INDEX: 29.6 KG/M2 | OXYGEN SATURATION: 96 % | DIASTOLIC BLOOD PRESSURE: 78 MMHG | HEIGHT: 67 IN | TEMPERATURE: 98 F

## 2019-03-08 VITALS
RESPIRATION RATE: 16 BRPM | SYSTOLIC BLOOD PRESSURE: 124 MMHG | HEIGHT: 67 IN | TEMPERATURE: 98 F | BODY MASS INDEX: 29.6 KG/M2 | HEART RATE: 66 BPM | DIASTOLIC BLOOD PRESSURE: 78 MMHG | OXYGEN SATURATION: 96 %

## 2019-03-08 DIAGNOSIS — I48.0 PAF (PAROXYSMAL ATRIAL FIBRILLATION) (H): Primary | ICD-10-CM

## 2019-03-08 PROCEDURE — 99205 OFFICE O/P NEW HI 60 MIN: CPT | Mod: GT | Performed by: INTERNAL MEDICINE

## 2019-03-08 ASSESSMENT — PAIN SCALES - GENERAL
PAINLEVEL: NO PAIN (0)
PAINLEVEL: NO PAIN (0)

## 2019-03-08 NOTE — NURSING NOTE
"Pt presents for consult today to discuss Afib and possible ablation. Denies chest pain/pressure, SOB, nausea, fatigue, or lightheadedness. LCTA. Pt states not taking ASA at this time. Only takes Diltiazem PRN approx 1-2x/week at most.     Chief Complaint   Patient presents with     Consult     Afib       Initial /78 (BP Location: Right arm, Patient Position: Sitting, Cuff Size: Adult Regular)   Pulse 66   Temp 98  F (36.7  C) (Tympanic)   Resp 16   Ht 1.702 m (5' 7\")   SpO2 96%   BMI 29.60 kg/m   Estimated body mass index is 29.6 kg/m  as calculated from the following:    Height as of this encounter: 1.702 m (5' 7\").    Weight as of 2/12/19: 85.7 kg (189 lb).  Meds Reconciled: complete  Pt is not on Aspirin  Pt is not on a Statin  PHQ and/or MARIO reviewed. Pt referred to PCP/MH Provider as appropriate.    PLAN Per Dr. Villegas:    1. Sleep study and evaluation referral with Dr. Hope.    2. Zio Patch, you will be called to schedule this.   3. We will call with results.       Denisa Ogden RN      "

## 2019-03-08 NOTE — LETTER
3/8/2019      RE: Felicitas Rojas  [address]  [place] [address]       Dear Colleague,    Thank you for the opportunity to participate in the care of your patient, Felicitas Rojas, at the [facility]. Please see a copy of my visit note below.    Electrophysiology Telemedicine Note  HPI:   Ms. Rojas is a 59 year old female who has a past medical history significant for HLD, and PAF (CHADSVASC 0). She was referred today for AF management.     She states she has had palpitations or fluttering in her chest since high school. In 2013, she had an episode of palpitations lasting longer then an hour for which she went into a local ER. By the time she had gotten to the ER her symptoms had abated and she was in sinus. No further intervention was done. Then in 4/2016, she had an episode of palpitations associated with presyncope for which she presented to her local ER again and was found to be in AF. She was started on diltiazem and was discharged home. She reported some possible side effects that she contributed to diltiazem and has taken it only as needed very sparingly. She continued to have further episodes lasting up to a few hours at a time. She was previously recommended to be on ASA, but was concerned about potential side effects. She was seen by Dr. Flores at [facility] in 2016 and plans were made for an ablation. Her symptoms improved so she deferred ablation at that time. Echo from 2016 showed normal structure and function. She more recently saw Dr. Parmar and reported recurrent palpitations/AF symptoms. She reported concerns about taking chronic AATs and was interested in discussion ablation again for which she was referred.    She states she is getting palpitations 1-2x/week that can last up to several hours. She takes PRN short acting diltiazem to help control her symptoms. She is not certain if the diltiazem is having much affect. She feels  "that gas fumes, MSG, caffeine, aspartame, ice cream, carbs, and lack of sleep can be triggers for AF episodes.  She denies any chest pain/pressures, dizziness, lightheadedness, worsening shortness of breath, leg/ankle swelling, PND, orthopnea, or syncopal symptoms. Current cardiac medication: diltiazem.     PAST MEDICAL HISTORY:  Past Medical History:   Diagnosis Date     Asymptomatic menopausal state     7/17/2014     Personal history of other medical treatment (CODE)     L5-S1     Personal history of other medical treatment (CODE)     9/13/2010,R knee; chronic mild problems       CURRENT MEDICATIONS:  Current Outpatient Medications   Medication Sig Dispense Refill     aspirin (ASA) 81 MG chewable tablet Take 1 tablet (81 mg) by mouth daily 90 tablet 3     diltiazem (CARDIZEM) 60 MG tablet Take 1 tablet (60 mg) by mouth 4 times daily as needed (palpitations) 30 tablet 1       PAST SURGICAL HISTORY:  Past Surgical History:   Procedure Laterality Date     ARTHROTOMY WRIST      1985,Surgery for De Quervain's tenosynovitis right wrist about 1985     FINGER SURGERY      03/06,Ulnar collateral ligament repair left thumb, Dr. Joy     OTHER SURGICAL HISTORY      2006,[id number],OTHER,Left,thumb fracture     OTHER SURGICAL HISTORY      1985 right & 2006 left,[id number],OTHER     OTHER SURGICAL HISTORY      01/4/02,72531.0,SKIN/SUBCUTANEOUS SURGERY,Excision of an intradermal nevus of the preauricular area       ALLERGIES:     Allergies   Allergen Reactions     Cephalexin Itching     pt wrote \"redness irritation, inflamation , pus at point of incision\"  questionable allergy     Cyclobenzaprine      Other reaction(s): Dizziness  Lightheadedness. Patient unsure if this is true.      Sulfa Drugs      Other reaction(s): *Unknown - Childhood Rxn     Sulfacetamide Unknown     Vitamin B12 Unknown     Ambien [Zolpidem]      Caused A-fib.      Vitamin B1 & B12  [Trophite] Rash     Sublingual vitamin       FAMILY HISTORY:  Family " "History   Problem Relation Age of Onset     Other - See Comments Mother 70        Alzheimer's     Prostate Cancer Father         Cancer-prostate     Diabetes Father         Diabetes     Other - See Comments Father         GI Disease,colon polyps     Heart Disease Father         Heart Disease,CAD     Cancer Paternal Grandmother         Cancer,eye cancer, type unknown     Other - See Comments No family hx of         Stroke,No FHx of CAD     Breast Cancer No family hx of         Cancer-breast       SOCIAL HISTORY:  Social History     Tobacco Use     Smoking status: Never Smoker     Smokeless tobacco: Never Used   Substance Use Topics     Alcohol use: No     Alcohol/week: 0.0 oz     Drug use: No     Comment: Drug use: No       ROS:   A comprehensive 10 point review of systems negative other than as mentioned in HPI.  Exam:  /78 (BP Location: Right arm, Patient Position: Sitting, Cuff Size: Adult Regular)   Pulse 66   Temp 98  F (36.7  C) (Tympanic)   Resp 16   Ht 1.702 m (5' 7\")   SpO2 96%   BMI 29.60 kg/m      Exam assisted by local RN  GENERAL APPEARANCE: alert and no distress  HEENT: MMM. PERRLA.   NECK: supple.   RESPIRATORY: lungs clear to auscultation - no rales, rhonchi or wheezes, no use of accessory muscles, no retractions, respirations are unlabored, normal respiratory rate  CARDIOVASCULAR: regular rhythm, S1/S2, no murmur.   ABDOMEN: soft, non tender, bowel sounds normal, non-distended  EXTREMITIES: peripheral pulses normal, no edema  NEURO: alert and oriented to person/place/time, normal speech, and affect  SKIN: no ecchymoses, no rashes  PSYCH: normal affect, cooperative    Labs:  Reviewed.     Testing/Procedures:  4/2016 ECHOCARDIOGRAM (OSH REPORT):     Final Impressions:   1. Normal left ventricular size, normal wall thickness, normal global systolic function, calculated EF of 68 %.   2. Right ventricular cavity size is normal, global systolic RV function is normal.   3. The mitral valve is " normal, trace mitral regurgitation.    Assessment and Plan:   Ms. Rojas is a 59 year old female who has a past medical history significant for HLD, and PAF (CHADSVASC 0). She was referred today for AF management. She states she has had palpitations or fluttering in her chest since high school. In , she had an episode of palpitations lasting longer then an hour for which she went into a local ER. By the time she had gotten to the ER her symptoms had abated and she was in sinus. No further intervention was done. Then in 2016, she had an episode of palpitations associated with presyncope for which she presented to her local ER again and was found to be in AF. She was started on diltiazem and was discharged home. She reported some possible side effects that she contributed to diltiazem and has taken it only as needed very sparingly. She continued to have further episodes lasting up to a few hours at a time. She was previously recommended to be on ASA, but was concerned about potential side effects. She was seen by Dr. Flores at [facility] in 2016 and plans were made for an ablation. Her symptoms improved so she deferred ablation at that time. Echo from 2016 showed normal structure and function. She more recently saw Dr. Parmar and reported recurrent palpitations/AF symptoms. She reported concerns about taking chronic AATs and was interested in discussion ablation again for which she was referred. She states she is getting palpitations 1-2x/week that can last up to several hours. She takes PRN short acting diltiazem to help control her symptoms.     Palpitations  History of Paroxsymal Atrial Fibrillation:   We discussed in detail with the patient management/treatment options for A.fib includin. Stroke Prophylaxis:  CHADSVASC=  0 (gender non-contributory if only risk factor), corresponding to a 0.2-0.5% annual stroke / systemic emolism event rate. Indicating NO need for long term oral anticoagulation. Discussed she  can be on ASA 81 mg daily or nothing. We would want to consider anticoagulation when she reaches age 63 or if she develops other risk factors prior.   2. Rate Control: Continue diltiazem PRN.   3. Rhythm Control: Cardioversion, Antiarrhythmics and/or ablation are options for rhythm control. She does have documented AF episode back in 2016.She is having recurrent symptoms, but these have not been re characterized. Certainly could be recurrent AF episodes, but would like to get a 2 week zio patch monitor to assess for arrhythmia and understand symptom/rhyhtm correlation. Discussed if AF identified use of AAT vs ablation. Discussed that she does not have any preclusions to AATs and we tend to favor Flecainide or Sotalol as first line agents. We also discussed role of ablation. The procedural risk of EP study and ablation were discussed in detail. Risks discussed include: vascular complications, CVA, AVB, pericardial effusion and tamponade, esophageal fistula, PV stenosis. AF ablation has 70% success at 1 year, 50% success at 5 years, and 30% need another ablation. She states understanding. We will call her with results of zio patch monitor to determine next steps.     4. Risk Factor Management: maintain good BP control, RD evaluation- she reports some snoring and had an incomplete RD test before. We will refer her to sleep medicine for evaluation.      Follow up to be determined based on results.     Mele Villegas MD [facility]  Cardiology - Electrophysiology    CC  AGUSTÍN ROCHE

## 2019-03-08 NOTE — PATIENT INSTRUCTIONS
You were seen by  Dr. Villegas     1. Sleep study and evaluation referral with Dr. Hope.      2. Zio Patch, you will be called to schedule this.     3. We will call with results.       You will follow up with Dr. Villegas based on testing.       Please call the cardiology office with problems, questions, or concerns at [phone].    If you experience chest pain, chest pressure, chest tightness, shortness of breath, fainting, lightheadedness, nausea, vomiting, or other concerning symptoms, please report to the Emergency Department or call 911. These symptoms may be emergent, and best treated in the Emergency Department.     CRISTOPHER WebsterN, RN  [facility] Cardiology  [phone]

## 2019-03-08 NOTE — PROGRESS NOTES
Electrophysiology Telemedicine Note  HPI:   Ms. Rojas is a 59 year old female who has a past medical history significant for HLD, and PAF (CHADSVASC 0). She was referred today for AF management.     She states she has had palpitations or fluttering in her chest since high school. In 2013, she had an episode of palpitations lasting longer then an hour for which she went into a local ER. By the time she had gotten to the ER her symptoms had abated and she was in sinus. No further intervention was done. Then in 4/2016, she had an episode of palpitations associated with presyncope for which she presented to her local ER again and was found to be in AF. She was started on diltiazem and was discharged home. She reported some possible side effects that she contributed to diltiazem and has taken it only as needed very sparingly. She continued to have further episodes lasting up to a few hours at a time. She was previously recommended to be on ASA, but was concerned about potential side effects. She was seen by Dr. Flores at [facility] in 2016 and plans were made for an ablation. Her symptoms improved so she deferred ablation at that time. Echo from 2016 showed normal structure and function. She more recently saw Dr. Parmar and reported recurrent palpitations/AF symptoms. She reported concerns about taking chronic AATs and was interested in discussion ablation again for which she was referred.    She states she is getting palpitations 1-2x/week that can last up to several hours. She takes PRN short acting diltiazem to help control her symptoms. She is not certain if the diltiazem is having much affect. She feels that gas fumes, MSG, caffeine, aspartame, ice cream, carbs, and lack of sleep can be triggers for AF episodes.  She denies any chest pain/pressures, dizziness, lightheadedness, worsening shortness of breath, leg/ankle swelling, PND, orthopnea, or syncopal symptoms. Current cardiac medication: diltiazem.     PAST MEDICAL  "HISTORY:  Past Medical History:   Diagnosis Date     Asymptomatic menopausal state     7/17/2014     Personal history of other medical treatment (CODE)     L5-S1     Personal history of other medical treatment (CODE)     9/13/2010,R knee; chronic mild problems       CURRENT MEDICATIONS:  Current Outpatient Medications   Medication Sig Dispense Refill     aspirin (ASA) 81 MG chewable tablet Take 1 tablet (81 mg) by mouth daily 90 tablet 3     diltiazem (CARDIZEM) 60 MG tablet Take 1 tablet (60 mg) by mouth 4 times daily as needed (palpitations) 30 tablet 1       PAST SURGICAL HISTORY:  Past Surgical History:   Procedure Laterality Date     ARTHROTOMY WRIST      1985,Surgery for De Quervain's tenosynovitis right wrist about 1985     FINGER SURGERY      03/06,Ulnar collateral ligament repair left thumb, Dr. Joy     OTHER SURGICAL HISTORY      2006,[id number],OTHER,Left,thumb fracture     OTHER SURGICAL HISTORY      1985 right & 2006 left,[id number],OTHER     OTHER SURGICAL HISTORY      01/4/02,44022.0,SKIN/SUBCUTANEOUS SURGERY,Excision of an intradermal nevus of the preauricular area       ALLERGIES:     Allergies   Allergen Reactions     Cephalexin Itching     pt wrote \"redness irritation, inflamation , pus at point of incision\"  questionable allergy     Cyclobenzaprine      Other reaction(s): Dizziness  Lightheadedness. Patient unsure if this is true.      Sulfa Drugs      Other reaction(s): *Unknown - Childhood Rxn     Sulfacetamide Unknown     Vitamin B12 Unknown     Ambien [Zolpidem]      Caused A-fib.      Vitamin B1 & B12  [Trophite] Rash     Sublingual vitamin       FAMILY HISTORY:  Family History   Problem Relation Age of Onset     Other - See Comments Mother 70        Alzheimer's     Prostate Cancer Father         Cancer-prostate     Diabetes Father         Diabetes     Other - See Comments Father         GI Disease,colon polyps     Heart Disease Father         Heart Disease,CAD     Cancer Paternal " "Grandmother         Cancer,eye cancer, type unknown     Other - See Comments No family hx of         Stroke,No FHx of CAD     Breast Cancer No family hx of         Cancer-breast       SOCIAL HISTORY:  Social History     Tobacco Use     Smoking status: Never Smoker     Smokeless tobacco: Never Used   Substance Use Topics     Alcohol use: No     Alcohol/week: 0.0 oz     Drug use: No     Comment: Drug use: No       ROS:   A comprehensive 10 point review of systems negative other than as mentioned in HPI.  Exam:  /78 (BP Location: Right arm, Patient Position: Sitting, Cuff Size: Adult Regular)   Pulse 66   Temp 98  F (36.7  C) (Tympanic)   Resp 16   Ht 1.702 m (5' 7\")   SpO2 96%   BMI 29.60 kg/m     Exam assisted by local RN  GENERAL APPEARANCE: alert and no distress  HEENT: MMM. PERRLA.   NECK: supple.   RESPIRATORY: lungs clear to auscultation - no rales, rhonchi or wheezes, no use of accessory muscles, no retractions, respirations are unlabored, normal respiratory rate  CARDIOVASCULAR: regular rhythm, S1/S2, no murmur.   ABDOMEN: soft, non tender, bowel sounds normal, non-distended  EXTREMITIES: peripheral pulses normal, no edema  NEURO: alert and oriented to person/place/time, normal speech, and affect  SKIN: no ecchymoses, no rashes  PSYCH: normal affect, cooperative    Labs:  Reviewed.     Testing/Procedures:  4/2016 ECHOCARDIOGRAM (OSH REPORT):     Final Impressions:   1. Normal left ventricular size, normal wall thickness, normal global systolic function, calculated EF of 68 %.   2. Right ventricular cavity size is normal, global systolic RV function is normal.   3. The mitral valve is normal, trace mitral regurgitation.    Assessment and Plan:   Ms. Rojas is a 59 year old female who has a past medical history significant for HLD, and PAF (CHADSVASC 0). She was referred today for AF management. She states she has had palpitations or fluttering in her chest since high school. In 2013, she had an " episode of palpitations lasting longer then an hour for which she went into a local ER. By the time she had gotten to the ER her symptoms had abated and she was in sinus. No further intervention was done. Then in 2016, she had an episode of palpitations associated with presyncope for which she presented to her local ER again and was found to be in AF. She was started on diltiazem and was discharged home. She reported some possible side effects that she contributed to diltiazem and has taken it only as needed very sparingly. She continued to have further episodes lasting up to a few hours at a time. She was previously recommended to be on ASA, but was concerned about potential side effects. She was seen by Dr. Flores at [facility] in 2016 and plans were made for an ablation. Her symptoms improved so she deferred ablation at that time. Echo from 2016 showed normal structure and function. She more recently saw Dr. Parmar and reported recurrent palpitations/AF symptoms. She reported concerns about taking chronic AATs and was interested in discussion ablation again for which she was referred. She states she is getting palpitations 1-2x/week that can last up to several hours. She takes PRN short acting diltiazem to help control her symptoms.     Palpitations  History of Paroxsymal Atrial Fibrillation:   We discussed in detail with the patient management/treatment options for Isabella includin. Stroke Prophylaxis:  CHADSVASC=  0 (gender non-contributory if only risk factor), corresponding to a 0.2-0.5% annual stroke / systemic emolism event rate. Indicating NO need for long term oral anticoagulation. Discussed she can be on ASA 81 mg daily or nothing. We would want to consider anticoagulation when she reaches age 63 or if she develops other risk factors prior.   2. Rate Control: Continue diltiazem PRN.   3. Rhythm Control: Cardioversion, Antiarrhythmics and/or ablation are options for rhythm control. She does have documented  AF episode back in 2016.She is having recurrent symptoms, but these have not been re characterized. Certainly could be recurrent AF episodes, but would like to get a 2 week zio patch monitor to assess for arrhythmia and understand symptom/rhyhtm correlation. Discussed if AF identified use of AAT vs ablation. Discussed that she does not have any preclusions to AATs and we tend to favor Flecainide or Sotalol as first line agents. We also discussed role of ablation. The procedural risk of EP study and ablation were discussed in detail. Risks discussed include: vascular complications, CVA, AVB, pericardial effusion and tamponade, esophageal fistula, PV stenosis. AF ablation has 70% success at 1 year, 50% success at 5 years, and 30% need another ablation. She states understanding. We will call her with results of zio patch monitor to determine next steps.     4. Risk Factor Management: maintain good BP control, RD evaluation- she reports some snoring and had an incomplete RD test before. We will refer her to sleep medicine for evaluation.      Follow up to be determined based on results.     Mele Villegas MD [facility]  Cardiology - Electrophysiology    CC  AGUSTÍN ROCHE

## 2019-03-13 ENCOUNTER — TELEPHONE (OUTPATIENT)
Dept: CARDIOLOGY | Facility: OTHER | Age: 59
End: 2019-03-13

## 2019-03-13 NOTE — TELEPHONE ENCOUNTER
Call back to pt who states she has only every taken Cardizem 60 mg one time for Afib and just realized she could take it up to 4 times daily as needed per Rx. Discussed how the Rx is written. Questions answered. Pt denies further questions at this time. Denisa Ogden RN on 3/13/2019 at 11:14 AM

## 2019-03-21 ENCOUNTER — TRANSFERRED RECORDS (OUTPATIENT)
Dept: HEALTH INFORMATION MANAGEMENT | Facility: OTHER | Age: 59
End: 2019-03-21

## 2019-03-23 PROBLEM — C44.310 BCC (BASAL CELL CARCINOMA), FACE: Status: ACTIVE | Noted: 2019-03-23

## 2019-03-25 ENCOUNTER — HOSPITAL ENCOUNTER (OUTPATIENT)
Dept: CARDIOLOGY | Facility: OTHER | Age: 59
Discharge: HOME | End: 2019-03-25
Attending: INTERNAL MEDICINE | Admitting: INTERNAL MEDICINE
Payer: COMMERCIAL

## 2019-03-25 DIAGNOSIS — I48.0 PAF (PAROXYSMAL ATRIAL FIBRILLATION) (H): ICD-10-CM

## 2019-03-25 PROCEDURE — 0298T ZZC EXT ECG > 48HR TO 21 DAY REVIEW AND INTERPRETATN: CPT | Performed by: INTERNAL MEDICINE

## 2019-03-25 PROCEDURE — 0296T ZIO PATCH HOLTER ADULT PEDIATRIC GREATER THAN 48 HRS: CPT

## 2019-03-25 NOTE — PATIENT INSTRUCTIONS
Patient instructed not to:   -take baths, swim, sauna   -remove device prior to 14 days   -use electric blankets   -shower or sweat excessively within first 24 hours of device application  Patient instructed to:   -shower as needed   -be carefull when toweling off and dressing   -press button when cardiac symptoms occur   -document symptoms in log book   -remove and return device (send via mail to [facility])   -Call [employer] with any questions    [phone]

## 2019-04-05 ENCOUNTER — OFFICE VISIT (OUTPATIENT)
Dept: FAMILY MEDICINE | Facility: OTHER | Age: 59
End: 2019-04-05
Attending: NURSE PRACTITIONER
Payer: COMMERCIAL

## 2019-04-05 VITALS
DIASTOLIC BLOOD PRESSURE: 8 MMHG | TEMPERATURE: 97.7 F | SYSTOLIC BLOOD PRESSURE: 122 MMHG | HEART RATE: 72 BPM | RESPIRATION RATE: 14 BRPM

## 2019-04-05 DIAGNOSIS — Z48.89 ENCOUNTER FOR POST SURGICAL WOUND CHECK: Primary | ICD-10-CM

## 2019-04-05 PROCEDURE — 99213 OFFICE O/P EST LOW 20 MIN: CPT | Performed by: NURSE PRACTITIONER

## 2019-04-05 ASSESSMENT — PAIN SCALES - GENERAL: PAINLEVEL: SEVERE PAIN (7)

## 2019-04-06 NOTE — NURSING NOTE
Pt states that she had surgery on Wed, and feels like sutures may be infected  Monika Milligan LPN.......4/5/2019 7:23 PM

## 2019-04-06 NOTE — PROGRESS NOTES
HPI:    Felicitas Rojas is a 59 year old female  who presents to clinic today for incision check.    She had a Mohs procedure by dermatology 2 days ago on her left forehead for basal cell carcinoma and she is concerned if it is infected as the skin surrounding the stitches is swollen and tender today.  No drainage or bleeding.  States she is completing the wound care as directed by dermatologist.  Denies any fevers.  Taking occasional Tylenol for discomfort.  States she also takes an 81 mg ASA daily.        Past Medical History:   Diagnosis Date     Asymptomatic menopausal state     7/17/2014     BCC (basal cell carcinoma), face 3/23/2019    Left forehead lesion biopsy positive BCC--followup Dr [facility]--consult skin exam 3/19--scheduled MOHS excision procedure 4/19 [facility] 3/21/19 [facility]     Personal history of other medical treatment (CODE)     L5-S1     Personal history of other medical treatment (CODE)     9/13/2010,R knee; chronic mild problems     Past Surgical History:   Procedure Laterality Date     ARTHROTOMY WRIST      1985,Surgery for De Quervain's tenosynovitis right wrist about 1985     FINGER SURGERY      03/06,Ulnar collateral ligament repair left thumb, Dr. Joy     OTHER SURGICAL HISTORY      2006,[id number],OTHER,Left,thumb fracture     OTHER SURGICAL HISTORY      1985 right & 2006 left,[id number],OTHER     OTHER SURGICAL HISTORY      01/4/02,68482.0,SKIN/SUBCUTANEOUS SURGERY,Excision of an intradermal nevus of the preauricular area     Social History     Tobacco Use     Smoking status: Never Smoker     Smokeless tobacco: Never Used   Substance Use Topics     Alcohol use: No     Alcohol/week: 0.0 oz     Current Outpatient Medications   Medication Sig Dispense Refill     aspirin (ASA) 81 MG chewable tablet Take 1 tablet (81 mg) by mouth daily (Patient not taking: Reported on 3/8/2019) 90 tablet 3     diltiazem (CARDIZEM) 60 MG tablet Take 1 tablet (60 mg) by mouth 4 times daily as  "needed (palpitations) 30 tablet 1     Allergies   Allergen Reactions     Cephalexin Itching     pt wrote \"redness irritation, inflamation , pus at point of incision\"  questionable allergy     Cyclobenzaprine      Other reaction(s): Dizziness  Lightheadedness. Patient unsure if this is true.      Sulfa Drugs      Other reaction(s): *Unknown - Childhood Rxn     Sulfacetamide Unknown     Vitamin B12 Unknown     Ambien [Zolpidem]      Caused A-fib.      Vitamin B1 & B12  [Trophite] Rash     Sublingual vitamin         Past medical history, past surgical history, current medications and allergies reviewed and accurate to the best of my knowledge.        ROS:  Refer to HPI    BP (!) 122/8 (BP Location: Left arm, Patient Position: Sitting, Cuff Size: Adult Regular)   Pulse 72   Temp 97.7  F (36.5  C) (Tympanic)   Resp 14     EXAM:  General Appearance: Well appearing adult female, appropriate appearance for age. No acute distress  Head: normocephalic, atraumatic  Eyes: conjunctivae normal without erythema or irritation, no drainage or crusting, no eyelid swelling, mild swelling just above the left eyebrow, pupils equal   Nose:  No swelling, no drainage or congestion   Respiratory: normal chest wall and respirations.  Normal effort. No cough appreciated.  Musculoskeletal:  Normal gait.  Equal movement of bilateral upper extremities.  Equal movement of bilateral lower extremities.    Dermatological: Left forehead with large slightly crooked T shaped surgical incision intact with running sutures, scant dried crusting, no surrounding erythema, warmth or bruising, no active drainage or bleeding.  Mild swelling just above the left eyebrow.    Psychological: normal affect, alert and pleasant        ASSESSMENT/PLAN:  1. Encounter for post surgical wound check    No clinical indications of infection    Follow surgeon's directions for wound care    Tylenol QID PRN    Discussed signs of infection  Discussed warning signs/symptoms " indicative of need to f/u    Follow up if symptoms persist or worsen or concerns  Follow up as directed by surgeon for suture removal

## 2019-04-10 ENCOUNTER — TELEPHONE (OUTPATIENT)
Dept: CARDIOLOGY | Facility: CLINIC | Age: 59
End: 2019-04-10

## 2019-04-10 NOTE — TELEPHONE ENCOUNTER
Call received from [employer] regarding abnormal Zio Patch results. Patch was worn for 13 days 20 hours (13 days 19 hours after artifact removed). Atrial fibrillation/flutter noted with longest duration 59 min and 18 sec. 2% AF [place]. Min 47 bpm, Max 238 bpm, Avg 70 bpm. 17 triggered events and 9 diary entries.     Routing to ordering provider for review. Denisa Ogden RN on 4/10/2019 at 3:43 PM

## 2019-04-11 ENCOUNTER — OFFICE VISIT (OUTPATIENT)
Dept: FAMILY MEDICINE | Facility: OTHER | Age: 59
End: 2019-04-11
Attending: FAMILY MEDICINE
Payer: COMMERCIAL

## 2019-04-11 VITALS — SYSTOLIC BLOOD PRESSURE: 128 MMHG | DIASTOLIC BLOOD PRESSURE: 62 MMHG | RESPIRATION RATE: 16 BRPM | HEART RATE: 64 BPM

## 2019-04-11 DIAGNOSIS — C44.310 BCC (BASAL CELL CARCINOMA), FACE: Primary | ICD-10-CM

## 2019-04-11 DIAGNOSIS — Z48.02 ENCOUNTER FOR REMOVAL OF SUTURES: ICD-10-CM

## 2019-04-11 PROCEDURE — 99213 OFFICE O/P EST LOW 20 MIN: CPT | Performed by: FAMILY MEDICINE

## 2019-04-11 ASSESSMENT — PATIENT HEALTH QUESTIONNAIRE - PHQ9: SUM OF ALL RESPONSES TO PHQ QUESTIONS 1-9: 0

## 2019-04-11 ASSESSMENT — PAIN SCALES - GENERAL: PAINLEVEL: NO PAIN (0)

## 2019-04-11 NOTE — NURSING NOTE
Patient here for suture removal from the left side of forehead. Medication Reconciliation: complete.    Jaimee Theodore LPN  4/11/2019 2:37 PM

## 2019-04-12 NOTE — PROGRESS NOTES
SUBJECTIVE:   Felicitas Rojas is a 59 year old female who presents to clinic today for the following health issues: Suture removal    Patient arrives here for suture removal.  Patient had Mohs surgery for basal cell carcinoma of the left forehead.  This occurred 8 days ago.  She arrives for suture removal.  Doing well.        Patient Active Problem List    Diagnosis Date Noted     BCC (basal cell carcinoma), face 03/23/2019     Priority: Medium     Left forehead lesion biopsy positive BCC--followup Dr Reshma Beard--consult skin exam 3/19--scheduled MOHS excision procedure 4/19 [facility]  3/21/19 [facility]       Ventricular ectopy 01/17/2019     Priority: Medium     PAC (premature atrial contraction) 01/17/2019     Priority: Medium     Palpitations 01/17/2019     Priority: Medium     Acne rosacea 01/16/2018     Priority: Medium     Displacement of intervertebral disc 01/16/2018     Priority: Medium     Overview:   L5-S1       Weight gain 01/16/2018     Priority: Medium     Mixed hyperlipidemia 07/19/2016     Priority: Medium     Atypical chest pain 05/19/2016     Priority: Medium     Dizzy spells 05/13/2016     Priority: Medium     Elevated BP without diagnosis of hypertension 05/13/2016     Priority: Medium     PAF (paroxysmal atrial fibrillation) (H) 05/13/2016     Priority: Medium     Postmenopausal 07/17/2014     Priority: Medium     Pityriasis lichenoides chronica 07/05/2011     Priority: Medium     Other tear of cartilage or meniscus of knee, current 09/13/2010     Priority: Medium     Overview:   R knee; chronic mild problems       Past Medical History:   Diagnosis Date     Asymptomatic menopausal state     7/17/2014     BCC (basal cell carcinoma), face 3/23/2019    Left forehead lesion biopsy positive BCC--followup Dr Reshma Beard--consult skin exam 3/19--scheduled MOHS excision procedure 4/19 [facility] 3/21/19 [facility]     Personal history of other medical treatment (CODE)     L5-S1      "Personal history of other medical treatment (CODE)     9/13/2010,R knee; chronic mild problems      Past Surgical History:   Procedure Laterality Date     ARTHROTOMY WRIST      1985,Surgery for De Quervain's tenosynovitis right wrist about 1985     FINGER SURGERY      03/06,Ulnar collateral ligament repair left thumb, Dr. Joy     OTHER SURGICAL HISTORY      2006,[id number],OTHER,Left,thumb fracture     OTHER SURGICAL HISTORY      1985 right & 2006 left,[id number],OTHER     OTHER SURGICAL HISTORY      01/4/02,48398.0,SKIN/SUBCUTANEOUS SURGERY,Excision of an intradermal nevus of the preauricular area     Allergies   Allergen Reactions     Aspartame Palpitations     Triggers A-Fib     Caffeine Palpitations     Triggers A-Fib     Monosodium Glutamate Palpitations     Triggers A-Fib     Cephalexin Itching     pt wrote \"redness irritation, inflamation , pus at point of incision\"  questionable allergy     Cyclobenzaprine      Other reaction(s): Dizziness  Lightheadedness. Patient unsure if this is true.      Sulfa Drugs      Other reaction(s): *Unknown - Childhood Rxn     Sulfacetamide Unknown     Vitamin B12 Unknown     Ambien [Zolpidem]      Caused A-fib.      Vitamin B1 & B12  [Trophite] Rash     Sublingual vitamin       Review of Systems     OBJECTIVE:     /62   Pulse 64   Resp 16   There is no height or weight on file to calculate BMI.  Physical Exam   Constitutional: She appears well-developed.   Skin:   There is a T-type incision with 2 running sutures.  No signs of secondary infection.  Appears to be healing well       none     ASSESSMENT/PLAN:         1. Encounter for removal of sutures      2. BCC (basal cell carcinoma), face  Wound is healing well.  Sutures removed and Steri-Strips applied.  Follow-up.        Db Aly MD  [facility]  "

## 2019-04-15 NOTE — TELEPHONE ENCOUNTER
Call to pt to offer telemed f/u with Dr. Villegas per LVW. Will call back with additional dates/times. Denisa Ogden RN on 4/15/2019 at 9:14 AM

## 2019-04-18 ENCOUNTER — TELEPHONE (OUTPATIENT)
Dept: CARDIOLOGY | Facility: CLINIC | Age: 59
End: 2019-04-18

## 2019-04-18 NOTE — TELEPHONE ENCOUNTER
Per LVW, pt to f/u with Dr. Villegas to discuss Zio results. Pt is agreeable to 05/14/19 at 0800 for 0830 appt. Denisa Odgen RN on 4/18/2019 at 4:24 PM

## 2019-04-18 NOTE — TELEPHONE ENCOUNTER
Pt now scheduled for 05/14 at 0800 for 0830 with Dr. Villegas. Denias Ogden RN on 4/18/2019 at 4:30 PM

## 2019-04-22 ENCOUNTER — OFFICE VISIT (OUTPATIENT)
Dept: PULMONOLOGY | Facility: OTHER | Age: 59
End: 2019-04-22
Attending: INTERNAL MEDICINE
Payer: COMMERCIAL

## 2019-04-22 VITALS
BODY MASS INDEX: 29.6 KG/M2 | HEART RATE: 72 BPM | SYSTOLIC BLOOD PRESSURE: 120 MMHG | HEIGHT: 67 IN | DIASTOLIC BLOOD PRESSURE: 70 MMHG

## 2019-04-22 DIAGNOSIS — I48.0 PAF (PAROXYSMAL ATRIAL FIBRILLATION) (H): Primary | ICD-10-CM

## 2019-04-22 PROCEDURE — G0463 HOSPITAL OUTPT CLINIC VISIT: HCPCS

## 2019-04-22 NOTE — PROGRESS NOTES
Sleep Medicine Progress Note  Felicitas Rojas  April 22, 2019  [id number]    Chief Complaint: Concern for obstructive sleep apnea    History of Present Illness: Felicitas Rojas is a 59 year old female presenting for above complaint.  She is seeing electrophysiology for paroxysmal atrial relation and they raised the possibility of obstructive sleep apnea.  She has frequent episodes of paroxysmal atrial fibrillation.  She sleeps with her  who is a sound sleeper.  She does not snore.  She does not awaken gasping.  She had a sleep study performed September 26, 2018.  That sleep study she felt was not diagnostic as she felt she is awake much of the night.  The data showed 304 minutes of sleep out of 500 minutes of time monitored.  All stages of sleep were present including REM sleep.  Her apnea hypopnea index was 0.4/h with a alex for oxygen desaturation of 90%.  She feels that her sleep is good quality.  She scores 1 on the [place] Sleepiness Scale.  She goes to bed between 9 and 10 PM and gets up at 4:30 AM with an alarm.  Some days she awakens before the alarm.  She feels restored when she awakens.    She is here wondering if she had a valid sleep study and if she has sleep apnea.    Past Medical History:  Past Medical History:   Diagnosis Date     Asymptomatic menopausal state     7/17/2014     BCC (basal cell carcinoma), face 3/23/2019    Left forehead lesion biopsy positive BCC--followup Dr Justice [facility]ermat[facility]--consult skin exam 3/19--scheduled MOHS excision procedure 4/19 [facility] 3/21/19 [facility]     Personal history of other medical treatment (CODE)     L5-S1     Personal history of other medical treatment (CODE)     9/13/2010,R knee; chronic mild problems       Medications:  Current Outpatient Medications   Medication     aspirin (ASA) 81 MG chewable tablet     diltiazem (CARDIZEM) 60 MG tablet     No current facility-administered medications for this visit.        Physical Exam:  /70   Pulse 72  "  Ht 5' 7\" (1.702 m)   BMI 29.60 kg/m    Neck circumference is 12-3/4 inches, pharynx is without erythema, dentition is intact, malampatti class III.  No significant tonsillar tissue.  Lungs are clear no wheeze, rhonchi, crackles, or focal dullness.  Cardiovascular exam S1-S2, regular rhythm and rate, no murmur, rub, or gallop.      Assessment and Plan:  59 year old female presenting for paroxysmal atrial fibrillation. She is without significant hypersomnia.  She does not have significant snoring noted by her  and she feels her sleep is good quality.  She is skeptical regarding her sleep study of whether she actually slept but reassurance was provided that the EEG does stage sleep and she did have sleep.  With her paroxysmal atrial fibrillation there is concern for sleep apnea.  She does not have enough criteria to meet for nocturnal polysomnogram.  We discussed screening with overnight oximetry.  If this shows significant rhythmic desaturations we discussed ordering a polysomnogram.  I will contact her with the results of the night oximetry.  I discussed the signs and symptoms of sleep apnea and other than the atrial fibrillation she does not have much to suggest sleep apnea.  We did review and discuss the results of her previous polysomnogram performed September 26, 2016 in detail.  It is unlikely that she had a false negative sleep study but will reassess with overnight oximetry.    CC: Zarina Conde NP  "

## 2019-05-03 ENCOUNTER — HEALTH MAINTENANCE LETTER (OUTPATIENT)
Age: 59
End: 2019-05-03

## 2019-05-14 ENCOUNTER — ALLIED HEALTH/NURSE VISIT (OUTPATIENT)
Dept: CARDIOLOGY | Facility: OTHER | Age: 59
End: 2019-05-14
Attending: INTERNAL MEDICINE
Payer: COMMERCIAL

## 2019-05-14 ENCOUNTER — OFFICE VISIT (OUTPATIENT)
Dept: CARDIOLOGY | Facility: CLINIC | Age: 59
End: 2019-05-14
Attending: INTERNAL MEDICINE
Payer: COMMERCIAL

## 2019-05-14 VITALS
BODY MASS INDEX: 29.6 KG/M2 | TEMPERATURE: 97.9 F | DIASTOLIC BLOOD PRESSURE: 78 MMHG | OXYGEN SATURATION: 98 % | SYSTOLIC BLOOD PRESSURE: 121 MMHG | RESPIRATION RATE: 16 BRPM | HEART RATE: 62 BPM | HEIGHT: 67 IN

## 2019-05-14 VITALS
TEMPERATURE: 97.9 F | DIASTOLIC BLOOD PRESSURE: 78 MMHG | HEIGHT: 67 IN | RESPIRATION RATE: 16 BRPM | OXYGEN SATURATION: 98 % | HEART RATE: 62 BPM | BODY MASS INDEX: 29.6 KG/M2 | SYSTOLIC BLOOD PRESSURE: 121 MMHG

## 2019-05-14 DIAGNOSIS — I48.0 PAF (PAROXYSMAL ATRIAL FIBRILLATION) (H): Primary | ICD-10-CM

## 2019-05-14 PROCEDURE — 99213 OFFICE O/P EST LOW 20 MIN: CPT | Performed by: INTERNAL MEDICINE

## 2019-05-14 ASSESSMENT — PAIN SCALES - GENERAL
PAINLEVEL: NO PAIN (0)
PAINLEVEL: NO PAIN (0)

## 2019-05-14 NOTE — NURSING NOTE
"Pt presents for f/u Afib. Zio patch 03/25/2019, consult with Dr. Hope 04/22/19. Reports has only taken 1 Cardizem in last 2-3 weeks. Has had 3 episodes of Afib in that timeframe. Further reports was intentionally attempting to trigger Afib while wearing Zio. Was symptomatic with episodes.     Denies chest pain/pressure, nausea, SOB, or lightheadedness today. Regular rate and rhythm. Lungs clear to auscultation.       Initial /78 (BP Location: Right arm, Patient Position: Sitting, Cuff Size: Adult Regular)   Pulse 62   Temp 97.9  F (36.6  C) (Tympanic)   Resp 16   Ht 1.702 m (5' 7\")   SpO2 98%   BMI 29.60 kg/m   Estimated body mass index is 29.6 kg/m  as calculated from the following:    Height as of this encounter: 1.702 m (5' 7\").    Weight as of 2/12/19: 85.7 kg (189 lb).  Meds Reconciled: complete  Pt is on Aspirin  Pt is not on a Statin  PHQ and/or MARIO reviewed. Pt referred to PCP/MH Provider as appropriate.    PLAN per Dr. Villegas:     1. Consider Sotalol vs Flecainide.   2. Call with decision.   3. Follow-up with Dr. Villegas in 4 months.     Denisa Ogden RN        "

## 2019-05-14 NOTE — LETTER
5/14/2019      RE: Felicitas Rojas  [address]  [place] [address]       Dear Colleague,    Thank you for the opportunity to participate in the care of your patient, Felicitas Rojas, at the [facility] HEART [facility]. Please see a copy of my visit note below.    Electrophysiology Telemedicine Note  HPI:   Ms. Rojas is a 59 year old female who has a past medical history significant for HLD, and PAF (CHADSVASC 0). She was referred today for AF management.     She states she has had palpitations or fluttering in her chest since high school. In 2013, she had an episode of palpitations lasting longer then an hour for which she went into a local ER. By the time she had gotten to the ER her symptoms had abated and she was in sinus. No further intervention was done. Then in 4/2016, she had an episode of palpitations associated with presyncope for which she presented to her local ER again and was found to be in AF. She was started on diltiazem and was discharged home. She reported some possible side effects that she contributed to diltiazem and has taken it only as needed very sparingly. She continued to have further episodes lasting up to a few hours at a time. She was previously recommended to be on ASA, but was concerned about potential side effects. She was seen by Dr. Flores at [facility] in 2016 and plans were made for an ablation. Her symptoms improved so she deferred ablation at that time. Echo from 2016 showed normal structure and function. She more recently saw Dr. Parmar and reported recurrent palpitations/AF symptoms. She reported concerns about taking chronic AATs and was interested in discussion ablation again for which she was referred.    EP Visit 3/8/19: She states she is getting palpitations 1-2x/week that can last up to several hours. She takes PRN short acting diltiazem to help control her symptoms. She is not certain if the diltiazem is having much  affect. She feels that gas fumes, MSG, caffeine, aspartame, ice cream, carbs, and lack of sleep can be triggers for AF episodes.  She denies any chest pain/pressures, dizziness, lightheadedness, worsening shortness of breath, leg/ankle swelling, PND, orthopnea, or syncopal symptoms. Current cardiac medication: diltiazem.     She presents today for follow up. She underwent a 2 week zio patch monitor from 3/25/19-4/8/19 which showed SR  with 2% AF up to 59m 18s and 17 patient activations often showing AF (occasionally sinus). She states she is having episodes of palpitations a few times a month. She will take diltiazem as needed for these episodes. She denies any chest pain/pressures, dizziness, lightheadedness, worsening shortness of breath, leg/ankle swelling, PND, orthopnea, or syncopal symptoms. Current cardiac medication: diltiazem.     PAST MEDICAL HISTORY:  Past Medical History:   Diagnosis Date     Asymptomatic menopausal state     7/17/2014     BCC (basal cell carcinoma), face 3/23/2019    Left forehead lesion biopsy positive BCC--followup Dr Justice [facility]--consult skin exam 3/19--scheduled MOHS excision procedure 4/19 [facility] 3/21/19 [facility]     Personal history of other medical treatment (CODE)     L5-S1     Personal history of other medical treatment (CODE)     9/13/2010,R knee; chronic mild problems       CURRENT MEDICATIONS:  Current Outpatient Medications   Medication Sig Dispense Refill     aspirin (ASA) 81 MG chewable tablet Take 1 tablet (81 mg) by mouth daily 90 tablet 3     diltiazem (CARDIZEM) 60 MG tablet Take 1 tablet (60 mg) by mouth 4 times daily as needed (palpitations) 30 tablet 1       PAST SURGICAL HISTORY:  Past Surgical History:   Procedure Laterality Date     ARTHROTOMY WRIST      1985,Surgery for De Quervain's tenosynovitis right wrist about 1985     FINGER SURGERY      03/06,Ulnar collateral ligament repair left thumb, Dr. Joy     OTHER SURGICAL HISTORY       "2006,[id number],OTHER,Left,thumb fracture     OTHER SURGICAL HISTORY      1985 right & 2006 left,[id number],OTHER     OTHER SURGICAL HISTORY      01/4/02,69723.0,SKIN/SUBCUTANEOUS SURGERY,Excision of an intradermal nevus of the preauricular area       ALLERGIES:     Allergies   Allergen Reactions     Aspartame Palpitations     Triggers A-Fib     Caffeine Palpitations     Triggers A-Fib     Monosodium Glutamate Palpitations     Triggers A-Fib     Cephalexin Itching     pt wrote \"redness irritation, inflamation , pus at point of incision\"  questionable allergy     Cyclobenzaprine      Other reaction(s): Dizziness  Lightheadedness. Patient unsure if this is true.      Sulfa Drugs      Other reaction(s): *Unknown - Childhood Rxn     Sulfacetamide Unknown     Vitamin B12 Unknown     Ambien [Zolpidem]      Caused A-fib.      Vitamin B1 & B12  [Trophite] Rash     Sublingual vitamin       FAMILY HISTORY:  Family History   Problem Relation Age of Onset     Other - See Comments Mother 70        Alzheimer's     Prostate Cancer Father         Cancer-prostate     Diabetes Father         Diabetes     Other - See Comments Father         GI Disease,colon polyps     Heart Disease Father         Heart Disease,CAD     Cancer Paternal Grandmother         Cancer,eye cancer, type unknown     Other - See Comments No family hx of         Stroke,No FHx of CAD     Breast Cancer No family hx of         Cancer-breast       SOCIAL HISTORY:  Social History     Tobacco Use     Smoking status: Never Smoker     Smokeless tobacco: Never Used   Substance Use Topics     Alcohol use: No     Alcohol/week: 0.0 oz     Drug use: No     Comment: Drug use: No       ROS:   A comprehensive 10 point review of systems negative other than as mentioned in HPI.  Exam:  /78 (BP Location: Right arm, Patient Position: Sitting, Cuff Size: Adult Regular)   Pulse 62   Temp 97.9  F (36.6  C) (Tympanic)   Resp 16   Ht 1.702 m (5' 7\")   SpO2 98%   BMI 29.60 kg/m    "   Exam assisted by local RN  GENERAL APPEARANCE: alert and no distress  HEENT: DILCIA BALL.   NECK: supple.   RESPIRATORY: lungs clear to auscultation - no rales, rhonchi or wheezes, no use of accessory muscles, no retractions, respirations are unlabored, normal respiratory rate  CARDIOVASCULAR: regular rhythm, S1/S2, no murmur.   ABDOMEN: soft, non tender, bowel sounds normal, non-distended  EXTREMITIES: peripheral pulses normal, no edema  NEURO: alert and oriented to person/place/time, normal speech, and affect  SKIN: no ecchymoses, no rashes  PSYCH: normal affect, cooperative    Labs:  Reviewed.     Testing/Procedures:  4/2016 ECHOCARDIOGRAM (OSH REPORT):     Final Impressions:   1. Normal left ventricular size, normal wall thickness, normal global systolic function, calculated EF of 68 %.   2. Right ventricular cavity size is normal, global systolic RV function is normal.   3. The mitral valve is normal, trace mitral regurgitation.    Assessment and Plan:   Ms. Rojas is a 59 year old female who has a past medical history significant for HLD, and PAF (CHADSVASC 0). She was referred today for AF management. She states she has had palpitations or fluttering in her chest since high school. In 2013, she had an episode of palpitations lasting longer then an hour for which she went into a local ER. By the time she had gotten to the ER her symptoms had abated and she was in sinus. No further intervention was done. Then in 4/2016, she had an episode of palpitations associated with presyncope for which she presented to her local ER again and was found to be in AF. She was started on diltiazem and was discharged home. She reported some possible side effects that she contributed to diltiazem and has taken it only as needed very sparingly. She continued to have further episodes lasting up to a few hours at a time. She was previously recommended to be on ASA, but was concerned about potential side effects. She was seen by  Dr. Flores at [facility] in 2016 and plans were made for an ablation. Her symptoms improved so she deferred ablation at that time. Echo from 2016 showed normal structure and function. She more recently saw Dr. Parmar and reported recurrent palpitations/AF symptoms. She presents today for follow up. She underwent a 2 week zio patch monitor from 3/25/19-19 which showed SR  with 2% AF up to 59m 18s and 17 patient activations often showing AF (occasionally sinus). She states she is having episodes of palpitations a few times a month. She will take diltiazem as needed for these episodes. She denies any chest pain/pressures, dizziness, lightheadedness, worsening shortness of breath, leg/ankle swelling, PND, orthopnea, or syncopal symptoms. Current cardiac medication: diltiazem.     Paroxsymal Atrial Fibrillation:   We discussed in detail with the patient management/treatment options for A.fib includin. Stroke Prophylaxis:  CHADSVASC=  0 (gender non-contributory if only risk factor), corresponding to a 0.2-0.5% annual stroke / systemic emolism event rate. Indicating NO need for long term oral anticoagulation. Discussed she can be on ASA 81 mg daily or nothing. We would want to consider anticoagulation when she reaches age 63 or if she develops other risk factors prior.   2. Rate Control: Continue diltiazem PRN.   3. Rhythm Control: Cardioversion, Antiarrhythmics and/or ablation are options for rhythm control. She does have documented AF episode back in 2016. Recent Zio patch monitor confirmed 2% AF burden. We discussed use of AAT vs ablation. Discussed that she does not have any preclusions to AATs and we tend to favor Flecainide or Sotalol as first line agents. We also discussed role of ablation. The procedural risk of EP study and ablation were discussed in detail. Risks discussed include: vascular complications, CVA, AVB, pericardial effusion and tamponade, esophageal fistula, PV stenosis. AF ablation has 80% success  at 1 year, 50% success at 5 years, and 30% need another ablation. She states understanding. She wants to continue with current management. She will consider starting an AAT and let us know if she wishes to pursue.     4. Risk Factor Management: maintain good BP control, RD evaluation- she reports some snoring and had an incomplete RD test before. We will refer her to sleep medicine for evaluation.      Follow up via telemedicine in 4 months.     Mele Villegas MD [facility]  Cardiology - Electrophysiology    CC  AGUSTÍN ROCHE

## 2019-05-14 NOTE — PATIENT INSTRUCTIONS
You were seen by  Dr. Merritt.      1. Consider Sotalol vs Flecainide.      2. Call [facility] (Denisa) with decision.       You will follow up with Dr. Villegas in 4 months, sooner if needed. Denisa will call with dates/times.       Please call the cardiology office with problems, questions, or concerns at [phone].    If you experience chest pain, chest pressure, chest tightness, shortness of breath, fainting, lightheadedness, nausea, vomiting, or other concerning symptoms, please report to the Emergency Department or call 911. These symptoms may be emergent, and best treated in the Emergency Department.     Denisa CRUZ BSN, RN  [facility]  [phone]

## 2019-05-14 NOTE — PROGRESS NOTES
Electrophysiology Telemedicine Note  HPI:   Ms. Rojas is a 59 year old female who has a past medical history significant for HLD, and PAF (CHADSVASC 0). She was referred today for AF management.     She states she has had palpitations or fluttering in her chest since high school. In 2013, she had an episode of palpitations lasting longer then an hour for which she went into a local ER. By the time she had gotten to the ER her symptoms had abated and she was in sinus. No further intervention was done. Then in 4/2016, she had an episode of palpitations associated with presyncope for which she presented to her local ER again and was found to be in AF. She was started on diltiazem and was discharged home. She reported some possible side effects that she contributed to diltiazem and has taken it only as needed very sparingly. She continued to have further episodes lasting up to a few hours at a time. She was previously recommended to be on ASA, but was concerned about potential side effects. She was seen by Dr. Flores at [facility] in 2016 and plans were made for an ablation. Her symptoms improved so she deferred ablation at that time. Echo from 2016 showed normal structure and function. She more recently saw Dr. Parmar and reported recurrent palpitations/AF symptoms. She reported concerns about taking chronic AATs and was interested in discussion ablation again for which she was referred.    EP Visit 3/8/19: She states she is getting palpitations 1-2x/week that can last up to several hours. She takes PRN short acting diltiazem to help control her symptoms. She is not certain if the diltiazem is having much affect. She feels that gas fumes, MSG, caffeine, aspartame, ice cream, carbs, and lack of sleep can be triggers for AF episodes.  She denies any chest pain/pressures, dizziness, lightheadedness, worsening shortness of breath, leg/ankle swelling, PND, orthopnea, or syncopal symptoms. Current cardiac medication: diltiazem.      She presents today for follow up. She underwent a 2 week zio patch monitor from 3/25/19-4/8/19 which showed SR  with 2% AF up to 59m 18s and 17 patient activations often showing AF (occasionally sinus). She states she is having episodes of palpitations a few times a month. She will take diltiazem as needed for these episodes. She denies any chest pain/pressures, dizziness, lightheadedness, worsening shortness of breath, leg/ankle swelling, PND, orthopnea, or syncopal symptoms. Current cardiac medication: diltiazem.     PAST MEDICAL HISTORY:  Past Medical History:   Diagnosis Date     Asymptomatic menopausal state     7/17/2014     BCC (basal cell carcinoma), face 3/23/2019    Left forehead lesion biopsy positive BCC--followup Dr [facility]--consult skin exam 3/19--scheduled MOHS excision procedure 4/19 [facility] 3/21/19 [facility]     Personal history of other medical treatment (CODE)     L5-S1     Personal history of other medical treatment (CODE)     9/13/2010,R knee; chronic mild problems       CURRENT MEDICATIONS:  Current Outpatient Medications   Medication Sig Dispense Refill     aspirin (ASA) 81 MG chewable tablet Take 1 tablet (81 mg) by mouth daily 90 tablet 3     diltiazem (CARDIZEM) 60 MG tablet Take 1 tablet (60 mg) by mouth 4 times daily as needed (palpitations) 30 tablet 1       PAST SURGICAL HISTORY:  Past Surgical History:   Procedure Laterality Date     ARTHROTOMY WRIST      1985,Surgery for De Quervain's tenosynovitis right wrist about 1985     FINGER SURGERY      03/06,Ulnar collateral ligament repair left thumb, Dr. Joy     OTHER SURGICAL HISTORY      2006,[id number],OTHER,Left,thumb fracture     OTHER SURGICAL HISTORY      1985 right & 2006 left,[id number],OTHER     OTHER SURGICAL HISTORY      01/4/02,90459.0,SKIN/SUBCUTANEOUS SURGERY,Excision of an intradermal nevus of the preauricular area       ALLERGIES:     Allergies   Allergen Reactions     Aspartame Palpitations     Triggers  "A-Fib     Caffeine Palpitations     Triggers A-Fib     Monosodium Glutamate Palpitations     Triggers A-Fib     Cephalexin Itching     pt wrote \"redness irritation, inflamation , pus at point of incision\"  questionable allergy     Cyclobenzaprine      Other reaction(s): Dizziness  Lightheadedness. Patient unsure if this is true.      Sulfa Drugs      Other reaction(s): *Unknown - Childhood Rxn     Sulfacetamide Unknown     Vitamin B12 Unknown     Ambien [Zolpidem]      Caused A-fib.      Vitamin B1 & B12  [Trophite] Rash     Sublingual vitamin       FAMILY HISTORY:  Family History   Problem Relation Age of Onset     Other - See Comments Mother 70        Alzheimer's     Prostate Cancer Father         Cancer-prostate     Diabetes Father         Diabetes     Other - See Comments Father         GI Disease,colon polyps     Heart Disease Father         Heart Disease,CAD     Cancer Paternal Grandmother         Cancer,eye cancer, type unknown     Other - See Comments No family hx of         Stroke,No FHx of CAD     Breast Cancer No family hx of         Cancer-breast       SOCIAL HISTORY:  Social History     Tobacco Use     Smoking status: Never Smoker     Smokeless tobacco: Never Used   Substance Use Topics     Alcohol use: No     Alcohol/week: 0.0 oz     Drug use: No     Comment: Drug use: No       ROS:   A comprehensive 10 point review of systems negative other than as mentioned in HPI.  Exam:  /78 (BP Location: Right arm, Patient Position: Sitting, Cuff Size: Adult Regular)   Pulse 62   Temp 97.9  F (36.6  C) (Tympanic)   Resp 16   Ht 1.702 m (5' 7\")   SpO2 98%   BMI 29.60 kg/m     Exam assisted by local RN  GENERAL APPEARANCE: alert and no distress  HEENT: MMM. PERRLA.   NECK: supple.   RESPIRATORY: lungs clear to auscultation - no rales, rhonchi or wheezes, no use of accessory muscles, no retractions, respirations are unlabored, normal respiratory rate  CARDIOVASCULAR: regular rhythm, S1/S2, no murmur. "   ABDOMEN: soft, non tender, bowel sounds normal, non-distended  EXTREMITIES: peripheral pulses normal, no edema  NEURO: alert and oriented to person/place/time, normal speech, and affect  SKIN: no ecchymoses, no rashes  PSYCH: normal affect, cooperative    Labs:  Reviewed.     Testing/Procedures:  4/2016 ECHOCARDIOGRAM (OSH REPORT):     Final Impressions:   1. Normal left ventricular size, normal wall thickness, normal global systolic function, calculated EF of 68 %.   2. Right ventricular cavity size is normal, global systolic RV function is normal.   3. The mitral valve is normal, trace mitral regurgitation.    Assessment and Plan:   Ms. Rojas is a 59 year old female who has a past medical history significant for HLD, and PAF (CHADSVASC 0). She was referred today for AF management. She states she has had palpitations or fluttering in her chest since high school. In 2013, she had an episode of palpitations lasting longer then an hour for which she went into a local ER. By the time she had gotten to the ER her symptoms had abated and she was in sinus. No further intervention was done. Then in 4/2016, she had an episode of palpitations associated with presyncope for which she presented to her local ER again and was found to be in AF. She was started on diltiazem and was discharged home. She reported some possible side effects that she contributed to diltiazem and has taken it only as needed very sparingly. She continued to have further episodes lasting up to a few hours at a time. She was previously recommended to be on ASA, but was concerned about potential side effects. She was seen by Dr. Flores at [facility] in 2016 and plans were made for an ablation. Her symptoms improved so she deferred ablation at that time. Echo from 2016 showed normal structure and function. She more recently saw Dr. Parmar and reported recurrent palpitations/AF symptoms. She presents today for follow up. She underwent a 2 week zio patch  monitor from 3/25/19-19 which showed SR  with 2% AF up to 59m 18s and 17 patient activations often showing AF (occasionally sinus). She states she is having episodes of palpitations a few times a month. She will take diltiazem as needed for these episodes. She denies any chest pain/pressures, dizziness, lightheadedness, worsening shortness of breath, leg/ankle swelling, PND, orthopnea, or syncopal symptoms. Current cardiac medication: diltiazem.     Paroxsymal Atrial Fibrillation:   We discussed in detail with the patient management/treatment options for Isabella includin. Stroke Prophylaxis:  CHADSVASC=  0 (gender non-contributory if only risk factor), corresponding to a 0.2-0.5% annual stroke / systemic emolism event rate. Indicating NO need for long term oral anticoagulation. Discussed she can be on ASA 81 mg daily or nothing. We would want to consider anticoagulation when she reaches age 63 or if she develops other risk factors prior.   2. Rate Control: Continue diltiazem PRN.   3. Rhythm Control: Cardioversion, Antiarrhythmics and/or ablation are options for rhythm control. She does have documented AF episode back in 2016. Recent Zio patch monitor confirmed 2% AF burden. We discussed use of AAT vs ablation. Discussed that she does not have any preclusions to AATs and we tend to favor Flecainide or Sotalol as first line agents. We also discussed role of ablation. The procedural risk of EP study and ablation were discussed in detail. Risks discussed include: vascular complications, CVA, AVB, pericardial effusion and tamponade, esophageal fistula, PV stenosis. AF ablation has 80% success at 1 year, 50% success at 5 years, and 30% need another ablation. She states understanding. She wants to continue with current management. She will consider starting an AAT and let us know if she wishes to pursue.     4. Risk Factor Management: maintain good BP control, RD evaluation- she reports some snoring and had an  incomplete RD test before. We will refer her to sleep medicine for evaluation.      Follow up via telemedicine in 4 months.     Mele Villegas MD [facility]  Cardiology - Electrophysiology    AGUSTÍN CHRISTINE.

## 2019-05-21 ENCOUNTER — HOSPITAL ENCOUNTER (OUTPATIENT)
Dept: RESPIRATORY THERAPY | Facility: OTHER | Age: 59
Discharge: HOME | End: 2019-05-21
Attending: INTERNAL MEDICINE | Admitting: INTERNAL MEDICINE
Payer: COMMERCIAL

## 2019-05-21 DIAGNOSIS — I48.0 PAF (PAROXYSMAL ATRIAL FIBRILLATION) (H): ICD-10-CM

## 2019-05-21 PROCEDURE — 94762 N-INVAS EAR/PLS OXIMTRY CONT: CPT

## 2019-05-30 ENCOUNTER — TELEPHONE (OUTPATIENT)
Dept: CARDIOLOGY | Facility: OTHER | Age: 59
End: 2019-05-30

## 2019-05-30 NOTE — TELEPHONE ENCOUNTER
Call back to pt who states after consideration she has decided to not pursue AAT at this time. She will continue on Diltiazem PRN. Reports that AF burden has been much less than 2% of the time since she is not trying to induce arhythmia as she was while wearing Zio patch. Pt is content with current regimen and will f/u as recommended with Dr. Villegas mid-September. Denisa Ogden RN on 5/30/2019 at 9:18 AM

## 2019-05-30 NOTE — TELEPHONE ENCOUNTER
Pt states that she was supposed to follow up regarding her medications, states she wants no changes.

## 2019-08-30 ENCOUNTER — APPOINTMENT (OUTPATIENT)
Dept: CT IMAGING | Facility: OTHER | Age: 59
End: 2019-08-30
Attending: FAMILY MEDICINE
Payer: COMMERCIAL

## 2019-08-30 ENCOUNTER — HOSPITAL ENCOUNTER (EMERGENCY)
Facility: OTHER | Age: 59
Discharge: HOME | End: 2019-08-30
Attending: FAMILY MEDICINE | Admitting: FAMILY MEDICINE
Payer: COMMERCIAL

## 2019-08-30 ENCOUNTER — ANESTHESIA (OUTPATIENT)
Dept: SURGERY | Facility: OTHER | Age: 59
End: 2019-08-30
Payer: COMMERCIAL

## 2019-08-30 ENCOUNTER — ANESTHESIA EVENT (OUTPATIENT)
Dept: SURGERY | Facility: OTHER | Age: 59
End: 2019-08-30
Payer: COMMERCIAL

## 2019-08-30 VITALS
DIASTOLIC BLOOD PRESSURE: 58 MMHG | SYSTOLIC BLOOD PRESSURE: 110 MMHG | WEIGHT: 190 LBS | RESPIRATION RATE: 20 BRPM | OXYGEN SATURATION: 91 % | TEMPERATURE: 99.9 F | HEIGHT: 68 IN | BODY MASS INDEX: 28.79 KG/M2 | HEART RATE: 77 BPM

## 2019-08-30 DIAGNOSIS — Z90.49 S/P LAPAROSCOPIC APPENDECTOMY: Primary | ICD-10-CM

## 2019-08-30 DIAGNOSIS — K35.80 ACUTE APPENDICITIS, UNSPECIFIED ACUTE APPENDICITIS TYPE: ICD-10-CM

## 2019-08-30 LAB
ALBUMIN SERPL-MCNC: 4.1 G/DL (ref 3.5–5.7)
ALBUMIN UR-MCNC: NEGATIVE MG/DL
ALP SERPL-CCNC: 95 U/L (ref 34–104)
ALT SERPL W P-5'-P-CCNC: 16 U/L (ref 7–52)
AMMONIA PLAS-SCNC: 29 UMOL/L (ref 16–53)
ANION GAP SERPL CALCULATED.3IONS-SCNC: 8 MMOL/L (ref 3–14)
APPEARANCE UR: CLEAR
AST SERPL W P-5'-P-CCNC: 16 U/L (ref 13–39)
BASOPHILS # BLD AUTO: 0 10E9/L (ref 0–0.2)
BASOPHILS NFR BLD AUTO: 0.3 %
BILIRUB SERPL-MCNC: 0.5 MG/DL (ref 0.3–1)
BILIRUB UR QL STRIP: NEGATIVE
BUN SERPL-MCNC: 18 MG/DL (ref 7–25)
CALCIUM SERPL-MCNC: 9.3 MG/DL (ref 8.6–10.3)
CHLORIDE SERPL-SCNC: 103 MMOL/L (ref 98–107)
CO2 SERPL-SCNC: 28 MMOL/L (ref 21–31)
COLOR UR AUTO: YELLOW
CREAT SERPL-MCNC: 0.86 MG/DL (ref 0.6–1.2)
CRP SERPL-MCNC: 1.3 MG/L
DIFFERENTIAL METHOD BLD: ABNORMAL
EOSINOPHIL # BLD AUTO: 0 10E9/L (ref 0–0.7)
EOSINOPHIL NFR BLD AUTO: 0 %
ERYTHROCYTE [DISTWIDTH] IN BLOOD BY AUTOMATED COUNT: 13.3 % (ref 10–15)
GFR SERPL CREATININE-BSD FRML MDRD: 67 ML/MIN/{1.73_M2}
GLUCOSE SERPL-MCNC: 159 MG/DL (ref 70–105)
GLUCOSE UR STRIP-MCNC: NEGATIVE MG/DL
HCT VFR BLD AUTO: 39.2 % (ref 35–47)
HGB BLD-MCNC: 13.2 G/DL (ref 11.7–15.7)
HGB UR QL STRIP: ABNORMAL
IMM GRANULOCYTES # BLD: 0.1 10E9/L (ref 0–0.4)
IMM GRANULOCYTES NFR BLD: 0.7 %
KETONES UR STRIP-MCNC: ABNORMAL MG/DL
LACTATE SERPL-SCNC: 1.3 MMOL/L (ref 0.5–2.2)
LEUKOCYTE ESTERASE UR QL STRIP: NEGATIVE
LIPASE SERPL-CCNC: 20 U/L (ref 11–82)
LYMPHOCYTES # BLD AUTO: 1 10E9/L (ref 0.8–5.3)
LYMPHOCYTES NFR BLD AUTO: 8 %
MAGNESIUM SERPL-MCNC: 2 MG/DL (ref 1.9–2.7)
MCH RBC QN AUTO: 29.6 PG (ref 26.5–33)
MCHC RBC AUTO-ENTMCNC: 33.7 G/DL (ref 31.5–36.5)
MCV RBC AUTO: 88 FL (ref 78–100)
MONOCYTES # BLD AUTO: 1.2 10E9/L (ref 0–1.3)
MONOCYTES NFR BLD AUTO: 10 %
NEUTROPHILS # BLD AUTO: 9.9 10E9/L (ref 1.6–8.3)
NEUTROPHILS NFR BLD AUTO: 81 %
NITRATE UR QL: NEGATIVE
NON-SQ EPI CELLS #/AREA URNS LPF: NORMAL /LPF
PH UR STRIP: 5 PH (ref 5–9)
PLATELET # BLD AUTO: 231 10E9/L (ref 150–450)
POTASSIUM SERPL-SCNC: 3.9 MMOL/L (ref 3.5–5.1)
PROT SERPL-MCNC: 7.1 G/DL (ref 6.4–8.9)
RBC # BLD AUTO: 4.46 10E12/L (ref 3.8–5.2)
RBC #/AREA URNS AUTO: NORMAL /HPF
SODIUM SERPL-SCNC: 139 MMOL/L (ref 134–144)
SOURCE: ABNORMAL
SP GR UR STRIP: 1.01 (ref 1–1.03)
TROPONIN I SERPL-MCNC: <0.03 UG/L (ref 0–0.03)
UROBILINOGEN UR STRIP-ACNC: 0.2 EU/DL (ref 0.2–1)
WBC # BLD AUTO: 12.2 10E9/L (ref 4–11)
WBC #/AREA URNS AUTO: NORMAL /HPF

## 2019-08-30 PROCEDURE — 36415 COLL VENOUS BLD VENIPUNCTURE: CPT | Mod: 91,GZ | Performed by: FAMILY MEDICINE

## 2019-08-30 PROCEDURE — 87040 BLOOD CULTURE FOR BACTERIA: CPT | Performed by: FAMILY MEDICINE

## 2019-08-30 PROCEDURE — 83735 ASSAY OF MAGNESIUM: CPT | Performed by: FAMILY MEDICINE

## 2019-08-30 PROCEDURE — 85025 COMPLETE CBC W/AUTO DIFF WBC: CPT | Performed by: FAMILY MEDICINE

## 2019-08-30 PROCEDURE — 82140 ASSAY OF AMMONIA: CPT | Performed by: FAMILY MEDICINE

## 2019-08-30 PROCEDURE — 86140 C-REACTIVE PROTEIN: CPT | Performed by: FAMILY MEDICINE

## 2019-08-30 PROCEDURE — 87040 BLOOD CULTURE FOR BACTERIA: CPT | Mod: 91 | Performed by: FAMILY MEDICINE

## 2019-08-30 PROCEDURE — 74177 CT ABD & PELVIS W/CONTRAST: CPT

## 2019-08-30 PROCEDURE — 40000306 ZZH STATISTIC PRE PROC ASSESS II: Performed by: SURGERY

## 2019-08-30 PROCEDURE — 71000014 ZZH RECOVERY PHASE 1 LEVEL 2 FIRST HR: Performed by: SURGERY

## 2019-08-30 PROCEDURE — 96375 TX/PRO/DX INJ NEW DRUG ADDON: CPT | Mod: XU | Performed by: FAMILY MEDICINE

## 2019-08-30 PROCEDURE — 37000009 ZZH ANESTHESIA TECHNICAL FEE, EACH ADDTL 15 MIN: Performed by: SURGERY

## 2019-08-30 PROCEDURE — 25000128 H RX IP 250 OP 636: Performed by: FAMILY MEDICINE

## 2019-08-30 PROCEDURE — 99140 ANES COMP EMERGENCY COND: CPT | Performed by: NURSE ANESTHETIST, CERTIFIED REGISTERED

## 2019-08-30 PROCEDURE — 71000027 ZZH RECOVERY PHASE 2 EACH 15 MINS: Performed by: SURGERY

## 2019-08-30 PROCEDURE — 83690 ASSAY OF LIPASE: CPT | Performed by: FAMILY MEDICINE

## 2019-08-30 PROCEDURE — 36000056 ZZH SURGERY LEVEL 3 1ST 30 MIN: Performed by: SURGERY

## 2019-08-30 PROCEDURE — 44970 LAPAROSCOPY APPENDECTOMY: CPT | Performed by: NURSE ANESTHETIST, CERTIFIED REGISTERED

## 2019-08-30 PROCEDURE — 81001 URINALYSIS AUTO W/SCOPE: CPT | Mod: XU | Performed by: FAMILY MEDICINE

## 2019-08-30 PROCEDURE — 96365 THER/PROPH/DIAG IV INF INIT: CPT | Mod: XU | Performed by: FAMILY MEDICINE

## 2019-08-30 PROCEDURE — 36415 COLL VENOUS BLD VENIPUNCTURE: CPT | Performed by: FAMILY MEDICINE

## 2019-08-30 PROCEDURE — 25500064 ZZH RX 255 OP 636: Performed by: FAMILY MEDICINE

## 2019-08-30 PROCEDURE — 88304 TISSUE EXAM BY PATHOLOGIST: CPT

## 2019-08-30 PROCEDURE — 25000125 ZZHC RX 250: Performed by: NURSE ANESTHETIST, CERTIFIED REGISTERED

## 2019-08-30 PROCEDURE — 99285 EMERGENCY DEPT VISIT HI MDM: CPT | Mod: Z6 | Performed by: FAMILY MEDICINE

## 2019-08-30 PROCEDURE — 84484 ASSAY OF TROPONIN QUANT: CPT | Performed by: FAMILY MEDICINE

## 2019-08-30 PROCEDURE — 25800025 ZZH RX 258: Performed by: SURGERY

## 2019-08-30 PROCEDURE — 25800030 ZZH RX IP 258 OP 636: Performed by: SURGERY

## 2019-08-30 PROCEDURE — 25800030 ZZH RX IP 258 OP 636: Performed by: NURSE ANESTHETIST, CERTIFIED REGISTERED

## 2019-08-30 PROCEDURE — 25000128 H RX IP 250 OP 636: Performed by: NURSE ANESTHETIST, CERTIFIED REGISTERED

## 2019-08-30 PROCEDURE — 37000008 ZZH ANESTHESIA TECHNICAL FEE, 1ST 30 MIN: Performed by: SURGERY

## 2019-08-30 PROCEDURE — 99285 EMERGENCY DEPT VISIT HI MDM: CPT | Mod: 25 | Performed by: FAMILY MEDICINE

## 2019-08-30 PROCEDURE — 81001 URINALYSIS AUTO W/SCOPE: CPT | Performed by: FAMILY MEDICINE

## 2019-08-30 PROCEDURE — 44970 LAPAROSCOPY APPENDECTOMY: CPT | Performed by: SURGERY

## 2019-08-30 PROCEDURE — 25000125 ZZHC RX 250: Performed by: SURGERY

## 2019-08-30 PROCEDURE — 80053 COMPREHEN METABOLIC PANEL: CPT | Performed by: FAMILY MEDICINE

## 2019-08-30 PROCEDURE — 27210794 ZZH OR GENERAL SUPPLY STERILE: Performed by: SURGERY

## 2019-08-30 PROCEDURE — 25800030 ZZH RX IP 258 OP 636: Performed by: FAMILY MEDICINE

## 2019-08-30 PROCEDURE — 36000058 ZZH SURGERY LEVEL 3 EA 15 ADDTL MIN: Performed by: SURGERY

## 2019-08-30 PROCEDURE — 83605 ASSAY OF LACTIC ACID: CPT | Performed by: FAMILY MEDICINE

## 2019-08-30 RX ORDER — SODIUM CHLORIDE, SODIUM LACTATE, POTASSIUM CHLORIDE, CALCIUM CHLORIDE 600; 310; 30; 20 MG/100ML; MG/100ML; MG/100ML; MG/100ML
INJECTION, SOLUTION INTRAVENOUS CONTINUOUS
Status: DISCONTINUED | OUTPATIENT
Start: 2019-08-30 | End: 2019-08-30 | Stop reason: HOSPADM

## 2019-08-30 RX ORDER — OXYCODONE AND ACETAMINOPHEN 5; 325 MG/1; MG/1
1 TABLET ORAL
Status: DISCONTINUED | OUTPATIENT
Start: 2019-08-30 | End: 2019-08-30 | Stop reason: HOSPADM

## 2019-08-30 RX ORDER — ONDANSETRON 2 MG/ML
4 INJECTION INTRAMUSCULAR; INTRAVENOUS EVERY 30 MIN PRN
Status: DISCONTINUED | OUTPATIENT
Start: 2019-08-30 | End: 2019-08-30 | Stop reason: HOSPADM

## 2019-08-30 RX ORDER — OXYCODONE AND ACETAMINOPHEN 5; 325 MG/1; MG/1
1-2 TABLET ORAL EVERY 4 HOURS PRN
Qty: 12 TABLET | Refills: 0 | Status: SHIPPED | OUTPATIENT
Start: 2019-08-30 | End: 2019-09-11

## 2019-08-30 RX ORDER — NEOSTIGMINE METHYLSULFATE 1 MG/ML
VIAL (ML) INJECTION PRN
Status: DISCONTINUED | OUTPATIENT
Start: 2019-08-30 | End: 2019-08-30

## 2019-08-30 RX ORDER — DEXAMETHASONE SODIUM PHOSPHATE 4 MG/ML
INJECTION, SOLUTION INTRA-ARTICULAR; INTRALESIONAL; INTRAMUSCULAR; INTRAVENOUS; SOFT TISSUE PRN
Status: DISCONTINUED | OUTPATIENT
Start: 2019-08-30 | End: 2019-08-30

## 2019-08-30 RX ORDER — ONDANSETRON 2 MG/ML
INJECTION INTRAMUSCULAR; INTRAVENOUS PRN
Status: DISCONTINUED | OUTPATIENT
Start: 2019-08-30 | End: 2019-08-30

## 2019-08-30 RX ORDER — SODIUM CHLORIDE 9 MG/ML
1000 INJECTION, SOLUTION INTRAVENOUS CONTINUOUS
Status: DISCONTINUED | OUTPATIENT
Start: 2019-08-30 | End: 2019-08-30 | Stop reason: HOSPADM

## 2019-08-30 RX ORDER — BUPIVACAINE HYDROCHLORIDE AND EPINEPHRINE 5; 5 MG/ML; UG/ML
INJECTION, SOLUTION PERINEURAL PRN
Status: DISCONTINUED | OUTPATIENT
Start: 2019-08-30 | End: 2019-08-30 | Stop reason: HOSPADM

## 2019-08-30 RX ORDER — KETAMINE HYDROCHLORIDE 50 MG/ML
INJECTION, SOLUTION INTRAMUSCULAR; INTRAVENOUS PRN
Status: DISCONTINUED | OUTPATIENT
Start: 2019-08-30 | End: 2019-08-30

## 2019-08-30 RX ORDER — KETOROLAC TROMETHAMINE 30 MG/ML
INJECTION, SOLUTION INTRAMUSCULAR; INTRAVENOUS PRN
Status: DISCONTINUED | OUTPATIENT
Start: 2019-08-30 | End: 2019-08-30

## 2019-08-30 RX ORDER — FENTANYL CITRATE 50 UG/ML
INJECTION, SOLUTION INTRAMUSCULAR; INTRAVENOUS PRN
Status: DISCONTINUED | OUTPATIENT
Start: 2019-08-30 | End: 2019-08-30

## 2019-08-30 RX ORDER — IBUPROFEN 600 MG/1
600 TABLET, FILM COATED ORAL EVERY 6 HOURS PRN
Qty: 30 TABLET | Refills: 0 | Status: SHIPPED | OUTPATIENT
Start: 2019-08-30 | End: 2019-12-16

## 2019-08-30 RX ORDER — ONDANSETRON 4 MG/1
4 TABLET, ORALLY DISINTEGRATING ORAL EVERY 30 MIN PRN
Status: DISCONTINUED | OUTPATIENT
Start: 2019-08-30 | End: 2019-08-30 | Stop reason: HOSPADM

## 2019-08-30 RX ORDER — LIDOCAINE HYDROCHLORIDE 20 MG/ML
INJECTION, SOLUTION INFILTRATION; PERINEURAL PRN
Status: DISCONTINUED | OUTPATIENT
Start: 2019-08-30 | End: 2019-08-30

## 2019-08-30 RX ORDER — PROPOFOL 10 MG/ML
INJECTION, EMULSION INTRAVENOUS CONTINUOUS PRN
Status: DISCONTINUED | OUTPATIENT
Start: 2019-08-30 | End: 2019-08-30

## 2019-08-30 RX ORDER — HYDROMORPHONE HYDROCHLORIDE 1 MG/ML
.3-.5 INJECTION, SOLUTION INTRAMUSCULAR; INTRAVENOUS; SUBCUTANEOUS EVERY 10 MIN PRN
Status: DISCONTINUED | OUTPATIENT
Start: 2019-08-30 | End: 2019-08-30 | Stop reason: HOSPADM

## 2019-08-30 RX ORDER — AMOXICILLIN 250 MG
1-2 CAPSULE ORAL 2 TIMES DAILY
Qty: 30 TABLET | Refills: 0 | Status: SHIPPED | OUTPATIENT
Start: 2019-08-30 | End: 2019-09-11

## 2019-08-30 RX ORDER — LIDOCAINE 40 MG/G
CREAM TOPICAL
Status: DISCONTINUED | OUTPATIENT
Start: 2019-08-30 | End: 2019-08-30 | Stop reason: HOSPADM

## 2019-08-30 RX ORDER — FENTANYL CITRATE 50 UG/ML
25-50 INJECTION, SOLUTION INTRAMUSCULAR; INTRAVENOUS
Status: DISCONTINUED | OUTPATIENT
Start: 2019-08-30 | End: 2019-08-30 | Stop reason: HOSPADM

## 2019-08-30 RX ORDER — IBUPROFEN 200 MG
600 TABLET ORAL
Status: DISCONTINUED | OUTPATIENT
Start: 2019-08-30 | End: 2019-08-30 | Stop reason: HOSPADM

## 2019-08-30 RX ORDER — GLYCOPYRROLATE 0.2 MG/ML
INJECTION, SOLUTION INTRAMUSCULAR; INTRAVENOUS PRN
Status: DISCONTINUED | OUTPATIENT
Start: 2019-08-30 | End: 2019-08-30

## 2019-08-30 RX ORDER — PROPOFOL 10 MG/ML
INJECTION, EMULSION INTRAVENOUS PRN
Status: DISCONTINUED | OUTPATIENT
Start: 2019-08-30 | End: 2019-08-30

## 2019-08-30 RX ORDER — PHENYLEPHRINE HYDROCHLORIDE 10 MG/ML
INJECTION INTRAVENOUS PRN
Status: DISCONTINUED | OUTPATIENT
Start: 2019-08-30 | End: 2019-08-30

## 2019-08-30 RX ORDER — MEPERIDINE HYDROCHLORIDE 50 MG/ML
12.5 INJECTION INTRAMUSCULAR; INTRAVENOUS; SUBCUTANEOUS
Status: DISCONTINUED | OUTPATIENT
Start: 2019-08-30 | End: 2019-08-30 | Stop reason: HOSPADM

## 2019-08-30 RX ORDER — NALOXONE HYDROCHLORIDE 0.4 MG/ML
.1-.4 INJECTION, SOLUTION INTRAMUSCULAR; INTRAVENOUS; SUBCUTANEOUS
Status: DISCONTINUED | OUTPATIENT
Start: 2019-08-30 | End: 2019-08-30 | Stop reason: HOSPADM

## 2019-08-30 RX ADMIN — PROPOFOL 225 MCG/KG/MIN: 10 INJECTION, EMULSION INTRAVENOUS at 12:27

## 2019-08-30 RX ADMIN — KETOROLAC TROMETHAMINE 30 MG: 30 INJECTION, SOLUTION INTRAMUSCULAR at 12:50

## 2019-08-30 RX ADMIN — HYDROMORPHONE HYDROCHLORIDE 1 MG: 1 INJECTION, SOLUTION INTRAMUSCULAR; INTRAVENOUS; SUBCUTANEOUS at 13:11

## 2019-08-30 RX ADMIN — LIDOCAINE HYDROCHLORIDE 70 MG: 20 INJECTION, SOLUTION INFILTRATION; PERINEURAL at 12:27

## 2019-08-30 RX ADMIN — PROPOFOL 170 MG: 10 INJECTION, EMULSION INTRAVENOUS at 12:27

## 2019-08-30 RX ADMIN — GLYCOPYRROLATE 0.6 MG: 0.2 INJECTION, SOLUTION INTRAMUSCULAR; INTRAVENOUS at 13:08

## 2019-08-30 RX ADMIN — SODIUM CHLORIDE 1000 ML: 9 INJECTION, SOLUTION INTRAVENOUS at 07:36

## 2019-08-30 RX ADMIN — KETAMINE HYDROCHLORIDE 30 MG: 50 INJECTION, SOLUTION INTRAMUSCULAR; INTRAVENOUS at 12:31

## 2019-08-30 RX ADMIN — ONDANSETRON 4 MG: 2 INJECTION INTRAMUSCULAR; INTRAVENOUS at 12:31

## 2019-08-30 RX ADMIN — SODIUM CHLORIDE, POTASSIUM CHLORIDE, SODIUM LACTATE AND CALCIUM CHLORIDE: 600; 310; 30; 20 INJECTION, SOLUTION INTRAVENOUS at 13:57

## 2019-08-30 RX ADMIN — PHENYLEPHRINE HYDROCHLORIDE 200 MCG: 10 INJECTION INTRAVENOUS at 12:40

## 2019-08-30 RX ADMIN — ROCURONIUM BROMIDE 40 MG: 10 INJECTION INTRAVENOUS at 12:27

## 2019-08-30 RX ADMIN — IOHEXOL 100 ML: 350 INJECTION, SOLUTION INTRAVENOUS at 08:37

## 2019-08-30 RX ADMIN — DEXAMETHASONE SODIUM PHOSPHATE 4 MG: 4 INJECTION, SOLUTION INTRA-ARTICULAR; INTRALESIONAL; INTRAMUSCULAR; INTRAVENOUS; SOFT TISSUE at 12:31

## 2019-08-30 RX ADMIN — ONDANSETRON HYDROCHLORIDE 4 MG: 2 SOLUTION INTRAMUSCULAR; INTRAVENOUS at 07:36

## 2019-08-30 RX ADMIN — SODIUM CHLORIDE 1000 ML: 9 INJECTION, SOLUTION INTRAVENOUS at 08:48

## 2019-08-30 RX ADMIN — NEOSTIGMINE METHYLSULFATE 3 MG: 1 INJECTION INTRAVENOUS at 13:08

## 2019-08-30 RX ADMIN — MIDAZOLAM 2 MG: 1 INJECTION INTRAMUSCULAR; INTRAVENOUS at 12:27

## 2019-08-30 RX ADMIN — TAZOBACTAM SODIUM AND PIPERACILLIN SODIUM 3.38 G: 375; 3 INJECTION, SOLUTION INTRAVENOUS at 10:33

## 2019-08-30 RX ADMIN — FENTANYL CITRATE 100 MCG: 50 INJECTION, SOLUTION INTRAMUSCULAR; INTRAVENOUS at 12:27

## 2019-08-30 RX ADMIN — SODIUM CHLORIDE, POTASSIUM CHLORIDE, SODIUM LACTATE AND CALCIUM CHLORIDE: 600; 310; 30; 20 INJECTION, SOLUTION INTRAVENOUS at 12:15

## 2019-08-30 ASSESSMENT — ENCOUNTER SYMPTOMS
VOMITING: 1
MUSCULOSKELETAL NEGATIVE: 1
RECTAL PAIN: 0
APPETITE CHANGE: 1
RESPIRATORY NEGATIVE: 1
ACTIVITY CHANGE: 0
ANAL BLEEDING: 0
BLOOD IN STOOL: 0
DIAPHORESIS: 1
CHILLS: 1
PSYCHIATRIC NEGATIVE: 1
CARDIOVASCULAR NEGATIVE: 1
DIARRHEA: 0
DYSRHYTHMIAS: 1
HEMATOLOGIC/LYMPHATIC NEGATIVE: 1
NEUROLOGICAL NEGATIVE: 1
FATIGUE: 1
NAUSEA: 1
CONSTIPATION: 0
UNEXPECTED WEIGHT CHANGE: 0
FEVER: 1
ABDOMINAL PAIN: 1

## 2019-08-30 ASSESSMENT — MIFFLIN-ST. JEOR: SCORE: 1475.33

## 2019-08-30 NOTE — H&P
".  General Surgery Consultation    HPI:  Felicitas Rojas  began having mid abdominal pain yesterday which then localized to the right lower quadrant.  Positive nausea and vomiting, no significant fevers or chills.  No previous episodes, and no sick contacts.  Pain has become more severe and the patient presented to the emergency room.  No history of Crohns or ulcerative colitis.  No history of pepticulcer disease or kidney stones.  No bleeding disorders.  Last bowel movement was yesterday, fairly normal.  No blood in the stool, emesis, or urine.    Hx of paroxsymal A fib      REVIEW OF SYSTEMS  GENERAL: No fevers or chills. Denies fatigue, recent weight loss.  HEENT: No sinus drainage. No changes with vision or hearing. No difficulty swallowing.   LYMPHATICS:  No swollen nodes in axilla, neck or groin.  CARDIOVASCULAR: Denies chest pain, palpitations and dyspnea on exertion.  VASCULAR: No hx of aneurysm, varicose veins, leg pain with walking, leg pain at night.  PULMONARY: No shortness of breath or cough. No increase in sputum production.   GI: Denies melena, bright red blood in stools. No hematemesis. No constipation or diarrhea.  : No dysuria or hematuria.  SKIN: No recent rashes or ulcers.   HEMATOLOGY:  No history of easy bruising or bleeding.  ENDOCRINE:  No history of diabetes or thyroid problems. No cold/heat intolerance.  NEUROLOGY:  No history of seizures or headaches. No motor or sensory changes.  PSYCH: No hx of depression or anxiety  MUSCULOSKELETAL: No Joint pain or muscle pain.         Exam:  /55   Pulse 73   Temp 99.9  F (37.7  C) (Tympanic)   Resp 13   Ht 1.727 m (5' 8\")   Wt 86.2 kg (190 lb)   SpO2 95%   Breastfeeding? No   BMI 28.89 kg/m    General: No acute distress. Pleasant and cooperative with exam and interview.  HEENT: Head: Normocephalic, atraumatic. Eyes: PERRLA, EOMI. No icterus. Nose: no nasal drainage. Nolesions. Mouth: mucus membranes are pink and moist. No " lesions.  Neck: trachea mid-line. No thyroid nodules.   Lymphatics: no adenopathy cervical, supraclavicular or axillary nodes.  Lungs: clear to auscultation, norespiratory distress.  Heart: regular, no murmur, no extremity edema.  Abdomen: positive bowel sounds, soft. No organomegaly. No hernia. Tender in right lower quadrant with palpation.  Skin: pink, warm and dry withno rash.  Psych: awake, alert and oriented x3. Affect appropriate.    Recent Labs   Lab Test 08/30/19  0735 05/13/16  1421 05/10/16  2030   WBC 12.2*  --   --    RBC 4.46  --   --    HGB 13.2 13.6 13.5   HCT 39.2 42.4 41.2   MCV 88 88 87   MCH 29.6 28.1 28.6   MCHC 33.7 32.0 32.8    268 267   MPV  --  8.0 7.2       Recent Labs   Lab Test 08/30/19  0735 12/20/18  1151   POTASSIUM 3.9 3.7   CHLORIDE 103 105   BUN 18 14     Recent Labs   Lab Test 08/30/19  1011 08/30/19  0735 12/20/18  1151   PROTEIN Negative  --   --    BILITOTAL  --  0.5 0.4   AST  --  16 17   ALT  --  16 18     Assessment:  Right lower quadrant abdominal pain.  Based on history and physical examination, labs, and CT scan images and report, most consistent with appendicitis.      Plan:  NPO/IVF/IV Abx  Pt tooperating room urgently for appendectomy.    Discussed options laparoscopic appendectomy, possible open vs medical management.    The patient and family were explained risks and benefits of the procedure includingbut not limited to bleeding and possible infection, possible conversion to open procedure and possible development of a wound infection or post operative abscess requiring drainage.  Also that the appendix will be removedeven if it appears normal.  Also possible damage to the bowel or bladder or surrounding tissues. They appeared to understand and wished to proceed.  Written informed consent paperwork was completed.    Case d/w Dr. Joyce in the ER    Alex Obrien MD on 8/30/2019 at 12:00 PM

## 2019-08-30 NOTE — ED TRIAGE NOTES
"ED Nursing Triage Note (General)   ________________________________    Felicitas Rojas is a 59 year old Female that presents to triage private car  With history of  Had sudden onset of vomiting last night and has been up most of the night vomiting, pain is in her stomach region but has settled in her right side, with tenderness throughout 4 quads, reported by patient   Significant symptoms had onset 12 hour(s) ago.  /66   Pulse 84   Temp 98.5  F (36.9  C) (Tympanic)   Resp 12   Ht 1.727 m (5' 8\")   Wt 86.2 kg (190 lb)   SpO2 98%   Breastfeeding? No   BMI 28.89 kg/m  t  Patient appears alert , in mild distress., and cooperative and pleasant behavior.    GCS:  15  Airway: intact  Breathing noted as Normal.  Circulation Normal  Skin normal, warm  Action taken:  Triage to critical care immediately      PRE HOSPITAL PRIOR LIVING SITUATION Spouse  "

## 2019-08-30 NOTE — PROGRESS NOTES

## 2019-08-30 NOTE — ANESTHESIA POSTPROCEDURE EVALUATION
Patient: Felicitas Rojas    Procedure(s):  APPENDECTOMY, LAPAROSCOPIC    Diagnosis:appendicitis  Diagnosis Additional Information: No value filed.    Anesthesia Type:  General, ETT    Note:  Anesthesia Post Evaluation    Patient location during evaluation: PACU  Patient participation: Able to fully participate in evaluation  Level of consciousness: awake and alert  Pain management: adequate  Airway patency: patent  Cardiovascular status: acceptable  Respiratory status: acceptable  Hydration status: acceptable  PONV: none     Anesthetic complications: None          Last vitals:  Vitals:    08/30/19 1345 08/30/19 1350 08/30/19 1355   BP: 116/60 117/63 118/62   Pulse: 70 67 77   Resp: 14 16 15   Temp:   (P) 100  F (37.8  C)   SpO2: 93% 93% 93%         Electronically Signed By: LATASHA Arechiga CRNA  August 30, 2019  1:58 PM

## 2019-08-30 NOTE — ANESTHESIA PREPROCEDURE EVALUATION
Anesthesia Pre-Procedure Evaluation    Patient: Felicitas Rojas   MRN: [id number] : 1960          Preoperative Diagnosis: appendicitis    Procedure(s):  APPENDECTOMY, LAPAROSCOPIC    Past Medical History:   Diagnosis Date     Asymptomatic menopausal state     2014     BCC (basal cell carcinoma), face 3/23/2019    Left forehead lesion biopsy positive BCC--followup Dr [facility]--consult skin exam 3/19--scheduled MOHS excision procedure  [facility] 3/21/19 [facility]     Personal history of other medical treatment (CODE)     L5-S1     Personal history of other medical treatment (CODE)     2010,R knee; chronic mild problems     Past Surgical History:   Procedure Laterality Date     ARTHROTOMY WRIST      ,Surgery for De Quervain's tenosynovitis right wrist about      FINGER SURGERY      ,Ulnar collateral ligament repair left thumb, Dr. Joy     OTHER SURGICAL HISTORY      ,179227,OTHER,Left,thumb fracture     OTHER SURGICAL HISTORY       right &  left,[id number],OTHER     OTHER SURGICAL HISTORY      02,12196.0,SKIN/SUBCUTANEOUS SURGERY,Excision of an intradermal nevus of the preauricular area       Anesthesia Evaluation     . Pt has had prior anesthetic.     No history of anesthetic complications          ROS/MED HX    ENT/Pulmonary:  - neg pulmonary ROS     Neurologic:     (+)migraines,     Cardiovascular:     (+) hypertension----. : . . . :. dysrhythmias a-fib, Irregular Heartbeat/Palpitations, .       METS/Exercise Tolerance:  >4 METS   Hematologic:  - neg hematologic  ROS       Musculoskeletal:  - neg musculoskeletal ROS       GI/Hepatic:     (+) appendicitis,       Renal/Genitourinary:  - ROS Renal section negative       Endo:  - neg endo ROS       Psychiatric:  - neg psychiatric ROS       Infectious Disease:  - neg infectious disease ROS       Malignancy:      - no malignancy   Other:    - neg other ROS                      Physical Exam  Normal systems:  "cardiovascular, pulmonary and dental    Airway   Mallampati: II  TM distance: >3 FB  Neck ROM: full    Dental     Cardiovascular   Rhythm and rate: regular and normal      Pulmonary    breath sounds clear to auscultation            Lab Results   Component Value Date    WBC 12.2 (H) 08/30/2019    HGB 13.2 08/30/2019    HCT 39.2 08/30/2019     08/30/2019    CRP 1.3 (H) 08/30/2019     08/30/2019    POTASSIUM 3.9 08/30/2019    CHLORIDE 103 08/30/2019    CO2 28 08/30/2019    BUN 18 08/30/2019    CR 0.86 08/30/2019     (H) 08/30/2019    CORRY 9.3 08/30/2019    MAG 2.0 08/30/2019    ALBUMIN 4.1 08/30/2019    PROTTOTAL 7.1 08/30/2019    ALT 16 08/30/2019    AST 16 08/30/2019    ALKPHOS 95 08/30/2019    BILITOTAL 0.5 08/30/2019    LIPASE 20 08/30/2019    ZANE 29 08/30/2019    PTT 36 05/10/2016    INR 1.1 09/06/2017       Preop Vitals  BP Readings from Last 3 Encounters:   08/30/19 127/64   05/14/19 121/78   05/14/19 121/78    Pulse Readings from Last 3 Encounters:   08/30/19 85   05/14/19 62   05/14/19 62      Resp Readings from Last 3 Encounters:   08/30/19 22   05/14/19 16   05/14/19 16    SpO2 Readings from Last 3 Encounters:   08/30/19 97%   05/14/19 98%   05/14/19 98%      Temp Readings from Last 1 Encounters:   08/30/19 99.9  F (37.7  C) (Tympanic)    Ht Readings from Last 1 Encounters:   08/30/19 1.727 m (5' 8\")      Wt Readings from Last 1 Encounters:   08/30/19 86.2 kg (190 lb)    Estimated body mass index is 28.89 kg/m  as calculated from the following:    Height as of this encounter: 1.727 m (5' 8\").    Weight as of this encounter: 86.2 kg (190 lb).       Anesthesia Plan      History & Physical Review      ASA Status:  2 emergent.    NPO Status:  > 6 hours    Plan for General and ETT with Propofol induction. Maintenance will be TIVA.    PONV prophylaxis:  Ondansetron (or other 5HT-3) and Dexamethasone or Solumedrol       Postoperative Care  Postoperative pain management:  IV analgesics.  "     Consents  Anesthetic plan, risks, benefits and alternatives discussed with:  Patient..                 LATASHA HOGAN CRNA

## 2019-08-30 NOTE — OR NURSING
PACU Respiratory Event Documentation     1) Episodes of Apnea greater than or equal to 10 seconds: no    2) Bradypnea - less than 8 breaths per minute: no    3) Pain score on 0 to 10 scale: None    4) Pain-sedation mismatch (yes or no): no    5) Repeated 02 desaturation less than 90% (yes or no): no    Anesthesia notified? (yes or no): no    Any of the above events occuring repeatedly in separate 30 minute intervals may be considered recurrent PACU respiratory events.

## 2019-08-30 NOTE — ANESTHESIA CARE TRANSFER NOTE
Patient: Felicitas Rojas    Procedure(s):  APPENDECTOMY, LAPAROSCOPIC    Diagnosis: appendicitis  Diagnosis Additional Information: No value filed.    Anesthesia Type:   General, ETT     Note:  Airway :Face Mask  Patient transferred to:PACU  Handoff Report: Identifed the Patient, Identified the Reponsible Provider, Reviewed the pertinent medical history, Discussed the surgical course, Reviewed Intra-OP anesthesia mangement and issues during anesthesia, Set expectations for post-procedure period and Allowed opportunity for questions and acknowledgement of understanding      Vitals: (Last set prior to Anesthesia Care Transfer)    CRNA VITALS  8/30/2019 1250 - 8/30/2019 1326      8/30/2019             Pulse:  103    Ht Rate:  103    SpO2:  97 %                Electronically Signed By: LATASHA Arechiga CRNA  August 30, 2019  1:26 PM

## 2019-08-30 NOTE — ED PROVIDER NOTES
"  History     Chief Complaint   Patient presents with     Abdominal Pain     HPI  Felicitas Rojas is a 59 year old female who presents with complaint of abdominal pain and vomitting for the last 12 hours. Symptoms came on abruptly about 12 hours ago . Pain initially was midepigastric in location , felt like a knot but then this AM when she woke up pain became more right lower quadrant in location NO fever. Has vomtted. Hourly for 3 hours straight and has vomitted 12 times. No diarrhea. Stools a little softer than normal NO suspicious or unusual foods . NO recent travel . No recent cough or cold symtpoms.  NO UTI symptom NO dysuria , frequency. No urgency . Pain level currently 5-6/10 after receiving zofran for nausea in ER .     Allergies:  Allergies   Allergen Reactions     Aspartame Palpitations     Triggers A-Fib     Caffeine Palpitations     Triggers A-Fib     Monosodium Glutamate Palpitations     Triggers A-Fib     Cephalexin Itching     pt wrote \"redness irritation, inflamation , pus at point of incision\"  questionable allergy     Cyclobenzaprine      Other reaction(s): Dizziness  Lightheadedness. Patient unsure if this is true.      Sulfa Drugs      Other reaction(s): *Unknown - Childhood Rxn     Sulfacetamide Unknown     Vitamin B12 Unknown     Ambien [Zolpidem]      Caused A-fib.      Vitamin B1 & B12  [Trophite] Rash     Sublingual vitamin       Problem List:    Patient Active Problem List    Diagnosis Date Noted     BCC (basal cell carcinoma), face 03/23/2019     Priority: Medium     Left forehead lesion biopsy positive BCC--followup Dr [facility]--consult skin exam 3/19--scheduled MOHS excision procedure 4/19 [facility]  3/21/19 [facility]       Ventricular ectopy 01/17/2019     Priority: Medium     PAC (premature atrial contraction) 01/17/2019     Priority: Medium     Palpitations 01/17/2019     Priority: Medium     Acne rosacea 01/16/2018     Priority: Medium     Displacement of intervertebral " disc 01/16/2018     Priority: Medium     Overview:   L5-S1       Weight gain 01/16/2018     Priority: Medium     Mixed hyperlipidemia 07/19/2016     Priority: Medium     Atypical chest pain 05/19/2016     Priority: Medium     Dizzy spells 05/13/2016     Priority: Medium     Elevated BP without diagnosis of hypertension 05/13/2016     Priority: Medium     PAF (paroxysmal atrial fibrillation) (H) 05/13/2016     Priority: Medium     Postmenopausal 07/17/2014     Priority: Medium     Pityriasis lichenoides chronica 07/05/2011     Priority: Medium     Other tear of cartilage or meniscus of knee, current 09/13/2010     Priority: Medium     Overview:   R knee; chronic mild problems          Past Medical History:    Past Medical History:   Diagnosis Date     Asymptomatic menopausal state      BCC (basal cell carcinoma), face 3/23/2019     Personal history of other medical treatment (CODE)      Personal history of other medical treatment (CODE)        Past Surgical History:    Past Surgical History:   Procedure Laterality Date     ARTHROTOMY WRIST      1985,Surgery for De Quervain's tenosynovitis right wrist about 1985     FINGER SURGERY      03/06,Ulnar collateral ligament repair left thumb, Dr. Joy     OTHER SURGICAL HISTORY      2006,[id number],OTHER,Left,thumb fracture     OTHER SURGICAL HISTORY      1985 right & 2006 left,[id number],OTHER     OTHER SURGICAL HISTORY      01/4/02,82818.0,SKIN/SUBCUTANEOUS SURGERY,Excision of an intradermal nevus of the preauricular area       Family History:    Family History   Problem Relation Age of Onset     Other - See Comments Mother 70        Alzheimer's     Prostate Cancer Father         Cancer-prostate     Diabetes Father         Diabetes     Other - See Comments Father         GI Disease,colon polyps     Heart Disease Father         Heart Disease,CAD     Cancer Paternal Grandmother         Cancer,eye cancer, type unknown     Other - See Comments No family hx of         Stroke,No  "FHx of CAD     Breast Cancer No family hx of         Cancer-breast       Social History:  Marital Status:   [2]  Social History     Tobacco Use     Smoking status: Never Smoker     Smokeless tobacco: Never Used   Substance Use Topics     Alcohol use: No     Alcohol/week: 0.0 oz     Drug use: No     Comment: Drug use: No        Medications:      aspirin (ASA) 81 MG chewable tablet   diltiazem (CARDIZEM) 60 MG tablet         Review of Systems   Constitutional: Positive for appetite change, chills, diaphoresis, fatigue and fever. Negative for activity change and unexpected weight change.   HENT: Negative.    Respiratory: Negative.    Cardiovascular: Negative.    Gastrointestinal: Positive for abdominal pain, nausea and vomiting. Negative for anal bleeding, blood in stool, constipation, diarrhea and rectal pain.   Genitourinary: Negative.    Musculoskeletal: Negative.    Neurological: Negative.    Hematological: Negative.    Psychiatric/Behavioral: Negative.    All other systems reviewed and are negative.      Physical Exam   BP: 134/66  Pulse: 84  Heart Rate: 73  Temp: 98.5  F (36.9  C)  Resp: 12  Height: 172.7 cm (5' 8\")  Weight: 86.2 kg (190 lb)  SpO2: 98 %      Physical Exam   Constitutional: She appears well-developed and well-nourished. No distress.   HENT:   Head: Normocephalic and atraumatic.   Mouth/Throat: Oropharynx is clear and moist.   Eyes: Pupils are equal, round, and reactive to light.   Cardiovascular: Normal rate, regular rhythm, normal heart sounds and intact distal pulses.   Pulmonary/Chest: Effort normal and breath sounds normal. No respiratory distress.   Abdominal: Soft. Normal appearance, normal aorta and bowel sounds are normal. There is no hepatosplenomegaly, splenomegaly or hepatomegaly. There is tenderness in the right lower quadrant. There is rebound and tenderness at McBurney's point. No hernia. Hernia confirmed negative in the ventral area, confirmed negative in the right inguinal " area and confirmed negative in the left inguinal area.   Skin: Skin is warm and dry.   Nursing note and vitals reviewed.      ED Course        Procedures          Patient presents to ER with 12 hour history of vomitting and right lower quadrant pain  Patient triaged to exam room. Vital signs reviewed. History and exam completed. Exam very suspicious for acute appendicitis. Peripheral IV inserted. Labs and diagnostics ordered. IV fluid bolus given. CT confirming acute appendicitis. Consultation with DR Obrien on call for surgery . Patient will be admitted for appendectomy . Patient given 3.375 mg IV zosyn , maintenance IVF initiated prior to admission for appendectomy   Results for orders placed or performed during the hospital encounter of 08/30/19   CT Abdomen Pelvis w Contrast    Narrative    Exam:CT ABDOMEN PELVIS W CONTRAST    History: 59 years Female with right lower quadrant pain/periumbilical  pain     Comparisons:    Technique: Axial CT imaging of the abdomen and pelvis was performed  with intervenous contrast. Coronal and sagittal reconstructions were  obtained      FINDINGS:  Lung bases: There is mild dependent atelectasis.    Abdomen:  Liver:Unremarkable  Gallbladder and biliary tree:No calcified gallstones are present.  There is no evidence of biliary dilatation.  Pancreas:Unremarkable  Spleen:Unremarkable  Adrenals:Normal    Kidneys and ureters:Unremarkable    Lymph nodes:There is no significant lymphadenopathy    Bowel: There is diverticulosis of the sigmoid colon. No abnormally  distended loops of bowel are present.    Appendix: Findings compatible with appendicitis. An appendicolith is  present at the base of the appendix. There is dilatation of the  appendix measuring up to 9 mm and there is periappendiceal edema. No  organized fluid collections are seen to suggest abscess.    Vessels:Unremarkable    Osseous structures:Unremarkable    Pelvis:There is no evidence of mass or lymphadenopathy. No  abnormal  fluid collections are present.            Impression    IMPRESSION: The above findings are compatible with acute appendicitis.  An appendicolith is present at the base of the appendix. There is  periappendiceal edema and abnormal distention of the appendix. There  is no evidence of abscess or free air at this time.    LIZETH ZAYAS MD   CBC with platelets differential   Result Value Ref Range    WBC 12.2 (H) 4.0 - 11.0 10e9/L    RBC Count 4.46 3.8 - 5.2 10e12/L    Hemoglobin 13.2 11.7 - 15.7 g/dL    Hematocrit 39.2 35.0 - 47.0 %    MCV 88 78 - 100 fl    MCH 29.6 26.5 - 33.0 pg    MCHC 33.7 31.5 - 36.5 g/dL    RDW 13.3 10.0 - 15.0 %    Platelet Count 231 150 - 450 10e9/L    Diff Method Automated Method     % Neutrophils 81.0 %    % Lymphocytes 8.0 %    % Monocytes 10.0 %    % Eosinophils 0.0 %    % Basophils 0.3 %    % Immature Granulocytes 0.7 %    Absolute Neutrophil 9.9 (H) 1.6 - 8.3 10e9/L    Absolute Lymphocytes 1.0 0.8 - 5.3 10e9/L    Absolute Monocytes 1.2 0.0 - 1.3 10e9/L    Absolute Eosinophils 0.0 0.0 - 0.7 10e9/L    Absolute Basophils 0.0 0.0 - 0.2 10e9/L    Abs Immature Granulocytes 0.1 0 - 0.4 10e9/L   Comprehensive metabolic panel   Result Value Ref Range    Sodium 139 134 - 144 mmol/L    Potassium 3.9 3.5 - 5.1 mmol/L    Chloride 103 98 - 107 mmol/L    Carbon Dioxide 28 21 - 31 mmol/L    Anion Gap 8 3 - 14 mmol/L    Glucose 159 (H) 70 - 105 mg/dL    Urea Nitrogen 18 7 - 25 mg/dL    Creatinine 0.86 0.60 - 1.20 mg/dL    GFR Estimate 67 >60 mL/min/[1.73_m2]    GFR Estimate If Black 81 >60 mL/min/[1.73_m2]    Calcium 9.3 8.6 - 10.3 mg/dL    Bilirubin Total 0.5 0.3 - 1.0 mg/dL    Albumin 4.1 3.5 - 5.7 g/dL    Protein Total 7.1 6.4 - 8.9 g/dL    Alkaline Phosphatase 95 34 - 104 U/L    ALT 16 7 - 52 U/L    AST 16 13 - 39 U/L   Lipase   Result Value Ref Range    Lipase 20 11 - 82 U/L   Lactic acid   Result Value Ref Range    Lactic Acid 1.3 0.5 - 2.2 mmol/L   Troponin I   Result Value Ref Range     Troponin I ES <0.030 0.000 - 0.034 ug/L   Ammonia   Result Value Ref Range    Ammonia 29 16 - 53 umol/L   CRP inflammation   Result Value Ref Range    CRP Inflammation 1.3 (H) <0.5 mg/L   UA reflex to Microscopic and Culture   Result Value Ref Range    Color Urine Yellow     Appearance Urine Clear     Glucose Urine Negative NEG^Negative mg/dL    Bilirubin Urine Negative NEG^Negative    Ketones Urine Trace (A) NEG^Negative mg/dL    Specific Gravity Urine 1.010 1.000 - 1.030    Blood Urine Trace (A) NEG^Negative    pH Urine 5.0 5.0 - 9.0 pH    Protein Albumin Urine Negative NEG^Negative mg/dL    Urobilinogen Urine 0.2 0.2 - 1.0 EU/dL    Nitrite Urine Negative NEG^Negative    Leukocyte Esterase Urine Negative NEG^Negative    Source Midstream Urine    Magnesium   Result Value Ref Range    Magnesium 2.0 1.9 - 2.7 mg/dL   Urine Microscopic   Result Value Ref Range    WBC Urine 0 - 5 OTO5^0 - 5 /HPF    RBC Urine O - 2 OTO2^O - 2 /HPF    Squamous Epithelial /LPF Urine Few FEW^Few /LPF           No results found for this or any previous visit (from the past 24 hour(s)).    Medications   0.9% sodium chloride BOLUS (1,000 mLs Intravenous New Bag 8/30/19 0736)     Followed by   sodium chloride 0.9% infusion (has no administration in time range)   ondansetron (ZOFRAN) injection 4 mg (4 mg Intravenous Given 8/30/19 0736)       Assessments & Plan (with Medical Decision Making)     I have reviewed the nursing notes.    I have reviewed the findings, diagnosis, plan and need for follow up with the patient.      New Prescriptions    No medications on file   y    Final diagnoses:   Acute appendicitis, unspecified acute appendicitis type       8/30/2019   [facility]     [facility] Ping Bronson MD  08/30/19 [id number]

## 2019-08-30 NOTE — OP NOTE
Preoperative Diagnosis: Acute appendicitis     Postoperative Diagnosis: Acute appendicitis with small volume purulent peritonitis    Procedure planned: Laparoscopic appendectomy    Procedure performed: Laparoscopic appendectomy     Surgeon: Alex Obrien MD    Circulator: Mandy Cardenas RN  Scrub Person: Stanton Sol  First Assistant: Marilee Obrien RN  Pre-Op Nurse: Harriet Gomes RN    Anesthesia: General endotracheal with local    Specimen: appendix     Estimated Blood Loss: less than 10 ml    INDICATIONS   Please see H&P. The patient had acute onset of RLQ pain. WBC is elevated and CT scan showed changes consistent with acute appendicitis. The risks, benefits and alternatives to laparoscopic appendectomy for treating acute appendicitiis were discussed with the patient. We specifically discussed the risks of infection, bleeding, injury to abdominal organs and the possible need for open procedure. The patient expressed understanding and questions were answered. Informed consent paperwork was completed.     DESCRIPTION OF PROCEDURE   The patient was brought to the operating room and placed in a supine position on the operating table. Appropriate monitors were attached. The patient received IV antibiotics preoperatively. After general anesthesia was induced, the patient was positioned, prepped and draped in the standard fashion. Time outwas performed confirming the patient's identity and procedure to be performed.   Local anesthetic was infiltrated in the skin and subcutaneous tissue just below the umbilicus. A 5 mm incision was made. The anterior fascia was grasped with a kocher.  The Veress needle was inserted into the peritoneal cavity and placement confirmed using saline test. CO2was then used to establish pneumoperitoneum. Trocar was inserted without difficulty. The camera was inserted, and the contents of the abdomen were inspected. No evidence of injury was noted on inspection. Local  anestheticwas infiltrated in the mid lower abdomen and the left lower abdomen. Skin incisions were made and under direct visualization trocars were positioned. The cecum was grasped and elevated. The appendix was identified and elevated. The base of the appendix was identified and grasped. Adhesions were taken down freeing the appendix to the tip. A window was created in the mesentery at the base of the appendix. The GI laparoscopic stapler was placed across the appendix and closed. The small bowel was inspected, no narrowing was noted. The stapler was fired. The staple line was inspected and there was excellent hemostasis. A vascular load was placed in the stapler and the staple positioned across the mesoappendix. The stapler was closed and fired. The staple line had excellent hemostasis. The appendix was placed in the retrieval bag and removed from the abdomen through the left lowerabdomen port. The staple lines were irrigated and the irrigation suctioned. Hemostasis was excellent. No evidence of leak was noted.  The bowel was inspected and there was a focally puckered area of possible past endometriosis about 15 cm proximal to the EC valve in the terminal ileum. This was not apparently symptomatic.  The camera was repositioned, and the umbilical port site was inspected. No evidence ofinjury noted. The pneumoperitoneum was then reduced and trocars removed from the abdomen. Further local anesthetic was infiltrated at each incision for postop pain control. Deep tissues at the left lower abdomen were approximated using Vicryl suture. Skin edges were approximated using interrupted Monocryl suture. Sterile dressings were applied. The patient was then awakened from anesthesia and taken to post anesthesia recovery in stablecondition. All needle, sponge and instrument counts were reported as correct at the conclusion of the case. The patient tolerated the procedure with no immediately apparent complications.     Alex SUE  MD Camille ....................  8/30/2019   1:15 PM

## 2019-09-03 ENCOUNTER — TELEPHONE (OUTPATIENT)
Dept: FAMILY MEDICINE | Facility: OTHER | Age: 59
End: 2019-09-03

## 2019-09-03 NOTE — TELEPHONE ENCOUNTER
Patient had an appy last week.    Just had a couple of questions regarding showering and the dressings.    Questions answered.    Leyda Guerrero LPN  9/3/2019  9:00 AM

## 2019-09-04 ENCOUNTER — TELEPHONE (OUTPATIENT)
Dept: SURGERY | Facility: OTHER | Age: 59
End: 2019-09-04

## 2019-09-04 NOTE — TELEPHONE ENCOUNTER
Patient had an appy.  She was told to take 3-5 days of work.    She does office work.    Wondering if she can go back to work Friday?        Can you write a note?  She'll pick it up when ready.    Leyda Guerrero LPN  9/4/2019  1:07 PM

## 2019-09-04 NOTE — LETTER
September 5, 2019      Felicitas Rojas  [address] [place] [id number]        To Whom It May Concern:      Felicitas Rojas was seen in our facility. She may return to work on 9/5/19 without restrictions.      Sincerely,        Alex Obrien MD

## 2019-09-05 LAB
BACTERIA SPEC CULT: NORMAL
BACTERIA SPEC CULT: NORMAL
SPECIMEN SOURCE: NORMAL
SPECIMEN SOURCE: NORMAL

## 2019-09-05 NOTE — TELEPHONE ENCOUNTER
Note written.    Message left for patient that it is ready for .  Leyda Guerrero LPN  9/5/2019  9:59 AM

## 2019-09-05 NOTE — TELEPHONE ENCOUNTER
Can you write a note?  I'll run to surgery to pick it up.  Leyda Guerrero LPN  9/5/2019  7:49 AM

## 2019-09-09 ENCOUNTER — TELEPHONE (OUTPATIENT)
Dept: CARDIOLOGY | Facility: CLINIC | Age: 59
End: 2019-09-09

## 2019-09-09 NOTE — TELEPHONE ENCOUNTER
Call to pt to offer f/u appt via telemed. Pt states she is feeling great and denies any recent episodes of AF. She has continued with dietary changes and trigger avoidance. She requests this writer check-in with her in Dec 2019 for consideration of f/u appt at that time. Provider notified. Denisa Ogden RN on 9/9/2019 at 12:34 PM

## 2019-09-11 ENCOUNTER — OFFICE VISIT (OUTPATIENT)
Dept: SURGERY | Facility: OTHER | Age: 59
End: 2019-09-11
Attending: SURGERY
Payer: COMMERCIAL

## 2019-09-11 VITALS
DIASTOLIC BLOOD PRESSURE: 76 MMHG | BODY MASS INDEX: 28.79 KG/M2 | SYSTOLIC BLOOD PRESSURE: 112 MMHG | HEIGHT: 68 IN | HEART RATE: 76 BPM | TEMPERATURE: 96.9 F | WEIGHT: 190 LBS | RESPIRATION RATE: 18 BRPM

## 2019-09-11 DIAGNOSIS — Z90.49 S/P LAPAROSCOPIC APPENDECTOMY: Primary | ICD-10-CM

## 2019-09-11 PROCEDURE — 99024 POSTOP FOLLOW-UP VISIT: CPT | Performed by: SURGERY

## 2019-09-11 ASSESSMENT — PAIN SCALES - GENERAL: PAINLEVEL: NO PAIN (0)

## 2019-09-11 ASSESSMENT — MIFFLIN-ST. JEOR: SCORE: 1475.33

## 2019-09-11 NOTE — NURSING NOTE
"Chief Complaint   Patient presents with     Surgical Followup     aapendectomy       Initial /76 (BP Location: Right arm, Patient Position: Sitting, Cuff Size: Adult Large)   Pulse 76   Temp 96.9  F (36.1  C) (Tympanic)   Resp 18   Ht 1.727 m (5' 8\")   Wt 86.2 kg (190 lb)   Breastfeeding? No   BMI 28.89 kg/m   Estimated body mass index is 28.89 kg/m  as calculated from the following:    Height as of this encounter: 1.727 m (5' 8\").    Weight as of this encounter: 86.2 kg (190 lb).  Medication Reconciliation: complete    Lynn Wooten LPN  "

## 2019-09-11 NOTE — PROGRESS NOTES
"Patient presents for post surgical visit after lap appy on 8/30. Patient has done well. No problems with incisions. One RLQ twinge yesterday. Nothing since.      /76 (BP Location: Right arm, Patient Position: Sitting, Cuff Size: Adult Large)   Pulse 76   Temp 96.9  F (36.1  C) (Tympanic)   Resp 18   Ht 1.727 m (5' 8\")   Wt 86.2 kg (190 lb)   Breastfeeding? No   BMI 28.89 kg/m      General: NAD, pleasant and cooperative with exam and interview.  Abdomen: healing incisions. No sign of infection. No pain with palpation.  Psychiatry: awake, alert and oriented. Appropriate affect.    Assessment/Plan:  Discussed surgery and pathology results. Patient can return to normal activities. Patient will call with questions or concerns.      Alex Obrien MD on 9/11/2019 at 10:59 AM     "

## 2019-12-03 ENCOUNTER — TELEPHONE (OUTPATIENT)
Dept: CARDIOLOGY | Facility: CLINIC | Age: 59
End: 2019-12-03

## 2019-12-03 NOTE — TELEPHONE ENCOUNTER
Called and talked with patient scheduled her for 12/16/2019 for Telemed with Dr. Villegas 0800 with nurse and 0830 with Dr. Villegas.  Patient is aware of date and time.  Delilah Nieto RN on 12/3/2019 at 9:40 AM

## 2019-12-10 ENCOUNTER — DOCUMENTATION ONLY (OUTPATIENT)
Dept: CARE COORDINATION | Facility: CLINIC | Age: 59
End: 2019-12-10

## 2019-12-16 ENCOUNTER — OFFICE VISIT (OUTPATIENT)
Dept: CARDIOLOGY | Facility: CLINIC | Age: 59
End: 2019-12-16
Attending: INTERNAL MEDICINE
Payer: COMMERCIAL

## 2019-12-16 ENCOUNTER — ALLIED HEALTH/NURSE VISIT (OUTPATIENT)
Dept: CARDIOLOGY | Facility: OTHER | Age: 59
End: 2019-12-16
Attending: INTERNAL MEDICINE
Payer: COMMERCIAL

## 2019-12-16 VITALS
OXYGEN SATURATION: 96 % | TEMPERATURE: 96.9 F | RESPIRATION RATE: 16 BRPM | SYSTOLIC BLOOD PRESSURE: 106 MMHG | DIASTOLIC BLOOD PRESSURE: 70 MMHG | HEART RATE: 60 BPM | BODY MASS INDEX: 28.89 KG/M2 | HEIGHT: 68 IN

## 2019-12-16 DIAGNOSIS — R00.2 PALPITATIONS: ICD-10-CM

## 2019-12-16 DIAGNOSIS — I48.0 PAF (PAROXYSMAL ATRIAL FIBRILLATION) (H): Primary | ICD-10-CM

## 2019-12-16 DIAGNOSIS — I48.0 PAF (PAROXYSMAL ATRIAL FIBRILLATION) (H): ICD-10-CM

## 2019-12-16 PROCEDURE — 99214 OFFICE O/P EST MOD 30 MIN: CPT | Mod: GT | Performed by: INTERNAL MEDICINE

## 2019-12-16 RX ORDER — DILTIAZEM HCL 60 MG
60 TABLET ORAL 4 TIMES DAILY PRN
Qty: 30 TABLET | Refills: 1 | Status: SHIPPED | OUTPATIENT
Start: 2019-12-16 | End: 2021-07-01

## 2019-12-16 ASSESSMENT — PAIN SCALES - GENERAL: PAINLEVEL: NO PAIN (0)

## 2019-12-16 NOTE — LETTER
12/16/2019      RE: Felicitas Rojas  [address]  [place] [address]       Dear Colleague,    Thank you for the opportunity to participate in the care of your patient, Felicitas Rojas, at the [facility]. Please see a copy of my visit note below.    Electrophysiology Telemedicine Note  HPI:   Ms. Rojas is a 59 year old female who has a past medical history significant for HLD, and PAF (CHADSVASC 0). She was referred today for AF management.     She states she has had palpitations or fluttering in her chest since high school. In 2013, she had an episode of palpitations lasting longer then an hour for which she went into a local ER. By the time she had gotten to the ER her symptoms had abated and she was in sinus. No further intervention was done. Then in 4/2016, she had an episode of palpitations associated with presyncope for which she presented to her local ER again and was found to be in AF. She was started on diltiazem and was discharged home. She reported some possible side effects that she contributed to diltiazem and has taken it only as needed very sparingly. She continued to have further episodes lasting up to a few hours at a time. She was previously recommended to be on ASA, but was concerned about potential side effects. She was seen by Dr. Flores at [facility] in 2016 and plans were made for an ablation. Her symptoms improved so she deferred ablation at that time. Echo from 2016 showed normal structure and function. She more recently saw Dr. Parmar and reported recurrent palpitations/AF symptoms. She reported concerns about taking chronic AATs and was interested in discussion ablation again for which she was referred.    EP Visit 3/8/19: She states she is getting palpitations 1-2x/week that can last up to several hours. She takes PRN short acting diltiazem to help control her symptoms. She is not certain if the diltiazem is having much  affect. She feels that gas fumes, MSG, caffeine, aspartame, ice cream, carbs, and lack of sleep can be triggers for AF episodes.  She denies any chest pain/pressures, dizziness, lightheadedness, worsening shortness of breath, leg/ankle swelling, PND, orthopnea, or syncopal symptoms. Current cardiac medication: diltiazem.     EP Visit 5/14/19: She presents today for follow up. She underwent a 2 week zio patch monitor from 3/25/19-4/8/19 which showed SR  with 2% AF up to 59m 18s and 17 patient activations often showing AF (occasionally sinus). She states she is having episodes of palpitations a few times a month. She will take diltiazem as needed for these episodes. She denies any chest pain/pressures, dizziness, lightheadedness, worsening shortness of breath, leg/ankle swelling, PND, orthopnea, or syncopal symptoms. Current cardiac medication: diltiazem.     She presents today for follow up. She reports feeling well.She has not had any significant AF symptoms since last visit. She saw sleep medicine since last visit and was deemed not to have RD. She denies any chest pain/pressures, dizziness, lightheadedness, worsening shortness of breath, leg/ankle swelling, PND, orthopnea, palpitations, or syncopal symptoms.  Current cardiac medication: diltiazem PRN.    PAST MEDICAL HISTORY:  Past Medical History:   Diagnosis Date     Asymptomatic menopausal state     7/17/2014     BCC (basal cell carcinoma), face 3/23/2019    Left forehead lesion biopsy positive BCC--followup Dr [facility]--consult skin exam 3/19--scheduled MOHS excision procedure 4/19 [facility] 3/21/19 [facility]     Personal history of other medical treatment (CODE)     L5-S1     Personal history of other medical treatment (CODE)     9/13/2010,R knee; chronic mild problems       CURRENT MEDICATIONS:  Current Outpatient Medications   Medication Sig Dispense Refill     amoxicillin-clavulanate (AUGMENTIN) 875-125 MG tablet Take 1 tablet by mouth 2 times  "daily 20 tablet 0     aspirin (ASA) 81 MG chewable tablet Take 1 tablet (81 mg) by mouth daily 90 tablet 3     diltiazem (CARDIZEM) 60 MG tablet Take 1 tablet (60 mg) by mouth 4 times daily as needed (palpitations) 30 tablet 1     ibuprofen (ADVIL/MOTRIN) 600 MG tablet Take 1 tablet (600 mg) by mouth every 6 hours as needed for pain (mild) 30 tablet 0       PAST SURGICAL HISTORY:  Past Surgical History:   Procedure Laterality Date     ARTHROTOMY WRIST      1985,Surgery for De Quervain's tenosynovitis right wrist about 1985     FINGER SURGERY      03/06,Ulnar collateral ligament repair left thumb, Dr. Joy     LAPAROSCOPIC APPENDECTOMY N/A 8/30/2019    Procedure: APPENDECTOMY, LAPAROSCOPIC;  Surgeon: Alex Obrien MD;  Location: [facility] OR     OTHER SURGICAL HISTORY      2006,[id number],OTHER,Left,thumb fracture     OTHER SURGICAL HISTORY      1985 right & 2006 left,590996,OTHER     OTHER SURGICAL HISTORY      01/4/02,27338.0,SKIN/SUBCUTANEOUS SURGERY,Excision of an intradermal nevus of the preauricular area       ALLERGIES:     Allergies   Allergen Reactions     Aspartame Palpitations     Triggers A-Fib     Caffeine Palpitations     Triggers A-Fib     Monosodium Glutamate Palpitations     Triggers A-Fib     Cephalexin Itching     pt wrote \"redness irritation, inflamation , pus at point of incision\"  questionable allergy     Cyclobenzaprine      Other reaction(s): Dizziness  Lightheadedness. Patient unsure if this is true.      Sulfa Drugs      Other reaction(s): *Unknown - Childhood Rxn     Sulfacetamide Unknown     Vitamin B12 Unknown     Ambien [Zolpidem]      Caused A-fib.      Vitamin B1 & B12  [Trophite] Rash     Sublingual vitamin       FAMILY HISTORY:  Family History   Problem Relation Age of Onset     Other - See Comments Mother 70        Alzheimer's     Prostate Cancer Father         Cancer-prostate     Diabetes Father         Diabetes     Other - See Comments Father         GI Disease,colon polyps     " "Heart Disease Father         Heart Disease,CAD     Cancer Paternal Grandmother         Cancer,eye cancer, type unknown     Other - See Comments No family hx of         Stroke,No FHx of CAD     Breast Cancer No family hx of         Cancer-breast       SOCIAL HISTORY:  Social History     Tobacco Use     Smoking status: Never Smoker     Smokeless tobacco: Never Used   Substance Use Topics     Alcohol use: No     Alcohol/week: 0.0 standard drinks     Drug use: No     Comment: Drug use: No       ROS:   A comprehensive 10 point review of systems negative other than as mentioned in HPI.  Exam:  Initial /70 (BP Location: Right arm, Patient Position: Sitting, Cuff Size: Adult Large)   Pulse 60   Temp 96.9  F (36.1  C)   Resp 16   Ht 1.727 m (5' 8\")   SpO2 96%   BMI 28.89 kg/m   Estimated body mass index is 28.89 kg/m  as calculated from the following:Height as of this encounter: 1.727 m (5' 8\"). Weight as of 9/11/19: 86.2 kg (190 lb).   Exam assisted by local RN  GENERAL APPEARANCE: alert and no distress  HEENT: MMM. PERRLA.   NECK: supple.   RESPIRATORY: lungs clear to auscultation - no rales, rhonchi or wheezes, no use of accessory muscles, no retractions, respirations are unlabored, normal respiratory rate  CARDIOVASCULAR: regular rhythm, S1/S2, no murmur.   ABDOMEN: soft, non tender, bowel sounds normal, non-distended  EXTREMITIES: peripheral pulses normal, no edema  NEURO: alert and oriented to person/place/time, normal speech, and affect  SKIN: no ecchymoses, no rashes  PSYCH: normal affect, cooperative    Labs:  Reviewed.     Testing/Procedures:  4/2016 ECHOCARDIOGRAM ([facility] REPORT):     Final Impressions:   1. Normal left ventricular size, normal wall thickness, normal global systolic function, calculated EF of 68 %.   2. Right ventricular cavity size is normal, global systolic RV function is normal.   3. The mitral valve is normal, trace mitral regurgitation.    Assessment and Plan:   Ms. Rojas is a 59 " year old female who has a past medical history significant for HLD, and PAF (CHADSVASC 0). She was referred today for AF management. She states she has had palpitations or fluttering in her chest since high school. In , she had an episode of palpitations lasting longer then an hour for which she went into a local ER. By the time she had gotten to the ER her symptoms had abated and she was in sinus. No further intervention was done. Then in 2016, she had an episode of palpitations associated with presyncope for which she presented to her local ER again and was found to be in AF. She was started on diltiazem and was discharged home. She reported some possible side effects that she contributed to diltiazem and has taken it only as needed very sparingly. She continued to have further episodes lasting up to a few hours at a time. She was previously recommended to be on ASA, but was concerned about potential side effects. She was seen by Dr. Flores at [facility] in  and plans were made for an ablation. Her symptoms improved so she deferred ablation at that time. Echo from 2016 showed normal structure and function. She more recently saw Dr. Parmar and reported recurrent palpitations/AF symptoms. She presents today for follow up. She reports feeling well.She has not had any significant AF symptoms since last visit. She denies any chest pain/pressures, dizziness, lightheadedness, worsening shortness of breath, leg/ankle swelling, PND, orthopnea, palpitations, or syncopal symptoms.  Current cardiac medication: diltiazem PRN.    Paroxsymal Atrial Fibrillation:   We discussed in detail with the patient management/treatment options for Isabella includin. Stroke Prophylaxis:  CHADSVASC=  0 (gender non-contributory if only risk factor), corresponding to a 0.2-0.5% annual stroke / systemic emolism event rate. Indicating NO need for long term oral anticoagulation. Discussed she can be on ASA 81 mg daily or nothing. We would  want to consider anticoagulation when she reaches age 63 or if she develops other risk factors prior.   2. Rate Control: Continue diltiazem PRN.   3. Rhythm Control: She does have documented AF episode back in 2016. Recent Zio patch monitor confirmed 2% AF burden. We discussed use of AAT vs ablation. Discussed that she does not have any preclusions to AATs and we tend to favor Flecainide or Sotalol as first line agents. We also discussed role of ablation. The procedural risk of EP study and ablation were discussed in detail. Risks discussed include: vascular complications, CVA, AVB, pericardial effusion and tamponade, esophageal fistula, PV stenosis. AF ablation has 80% success at 1 year, 50% success at 5 years, and 30% need another ablation. She states understanding. She wants to continue with current conservative management.     4. Risk Factor Management: maintain good BP control, RD evaluation- she reports some snoring and had an incomplete RD test before and saw sleep medicine since last visit and was deemed not to have RD.      Follow up via telemedicine in 1 year.     Mele Villegas MD [facility]  Cardiology - Electrophysiology    CC  AGUSTÍN ROCHE

## 2019-12-16 NOTE — PROGRESS NOTES
Electrophysiology Telemedicine Note  HPI:   Ms. Rojas is a 59 year old female who has a past medical history significant for HLD, and PAF (CHADSVASC 0). She was referred today for AF management.     She states she has had palpitations or fluttering in her chest since high school. In 2013, she had an episode of palpitations lasting longer then an hour for which she went into a local ER. By the time she had gotten to the ER her symptoms had abated and she was in sinus. No further intervention was done. Then in 4/2016, she had an episode of palpitations associated with presyncope for which she presented to her local ER again and was found to be in AF. She was started on diltiazem and was discharged home. She reported some possible side effects that she contributed to diltiazem and has taken it only as needed very sparingly. She continued to have further episodes lasting up to a few hours at a time. She was previously recommended to be on ASA, but was concerned about potential side effects. She was seen by Dr. Flores at [facility] in 2016 and plans were made for an ablation. Her symptoms improved so she deferred ablation at that time. Echo from 2016 showed normal structure and function. She more recently saw Dr. Parmar and reported recurrent palpitations/AF symptoms. She reported concerns about taking chronic AATs and was interested in discussion ablation again for which she was referred.    EP Visit 3/8/19: She states she is getting palpitations 1-2x/week that can last up to several hours. She takes PRN short acting diltiazem to help control her symptoms. She is not certain if the diltiazem is having much affect. She feels that gas fumes, MSG, caffeine, aspartame, ice cream, carbs, and lack of sleep can be triggers for AF episodes.  She denies any chest pain/pressures, dizziness, lightheadedness, worsening shortness of breath, leg/ankle swelling, PND, orthopnea, or syncopal symptoms. Current cardiac medication: diltiazem.      EP Visit 5/14/19: She presents today for follow up. She underwent a 2 week zio patch monitor from 3/25/19-4/8/19 which showed SR  with 2% AF up to 59m 18s and 17 patient activations often showing AF (occasionally sinus). She states she is having episodes of palpitations a few times a month. She will take diltiazem as needed for these episodes. She denies any chest pain/pressures, dizziness, lightheadedness, worsening shortness of breath, leg/ankle swelling, PND, orthopnea, or syncopal symptoms. Current cardiac medication: diltiazem.     She presents today for follow up. She reports feeling well.She has not had any significant AF symptoms since last visit. She saw sleep medicine since last visit and was deemed not to have RD. She denies any chest pain/pressures, dizziness, lightheadedness, worsening shortness of breath, leg/ankle swelling, PND, orthopnea, palpitations, or syncopal symptoms.  Current cardiac medication: diltiazem PRN.    PAST MEDICAL HISTORY:  Past Medical History:   Diagnosis Date     Asymptomatic menopausal state     7/17/2014     BCC (basal cell carcinoma), face 3/23/2019    Left forehead lesion biopsy positive BCC--followup Dr [facility]--consult skin exam 3/19--scheduled MOHS excision procedure 4/19 [facility] 3/21/19 [facility]     Personal history of other medical treatment (CODE)     L5-S1     Personal history of other medical treatment (CODE)     9/13/2010,R knee; chronic mild problems       CURRENT MEDICATIONS:  Current Outpatient Medications   Medication Sig Dispense Refill     amoxicillin-clavulanate (AUGMENTIN) 875-125 MG tablet Take 1 tablet by mouth 2 times daily 20 tablet 0     aspirin (ASA) 81 MG chewable tablet Take 1 tablet (81 mg) by mouth daily 90 tablet 3     diltiazem (CARDIZEM) 60 MG tablet Take 1 tablet (60 mg) by mouth 4 times daily as needed (palpitations) 30 tablet 1     ibuprofen (ADVIL/MOTRIN) 600 MG tablet Take 1 tablet (600 mg) by mouth every 6 hours as  "needed for pain (mild) 30 tablet 0       PAST SURGICAL HISTORY:  Past Surgical History:   Procedure Laterality Date     ARTHROTOMY WRIST      1985,Surgery for De Quervain's tenosynovitis right wrist about 1985     FINGER SURGERY      03/06,Ulnar collateral ligament repair left thumb, Dr. Joy     LAPAROSCOPIC APPENDECTOMY N/A 8/30/2019    Procedure: APPENDECTOMY, LAPAROSCOPIC;  Surgeon: Alex Obrien MD;  Location: [facility] OR     OTHER SURGICAL HISTORY      2006,[id number],OTHER,Left,thumb fracture     OTHER SURGICAL HISTORY      1985 right & 2006 left,[id number],OTHER     OTHER SURGICAL HISTORY      01/4/02,24073.0,SKIN/SUBCUTANEOUS SURGERY,Excision of an intradermal nevus of the preauricular area       ALLERGIES:     Allergies   Allergen Reactions     Aspartame Palpitations     Triggers A-Fib     Caffeine Palpitations     Triggers A-Fib     Monosodium Glutamate Palpitations     Triggers A-Fib     Cephalexin Itching     pt wrote \"redness irritation, inflamation , pus at point of incision\"  questionable allergy     Cyclobenzaprine      Other reaction(s): Dizziness  Lightheadedness. Patient unsure if this is true.      Sulfa Drugs      Other reaction(s): *Unknown - Childhood Rxn     Sulfacetamide Unknown     Vitamin B12 Unknown     Ambien [Zolpidem]      Caused A-fib.      Vitamin B1 & B12  [Trophite] Rash     Sublingual vitamin       FAMILY HISTORY:  Family History   Problem Relation Age of Onset     Other - See Comments Mother 70        Alzheimer's     Prostate Cancer Father         Cancer-prostate     Diabetes Father         Diabetes     Other - See Comments Father         GI Disease,colon polyps     Heart Disease Father         Heart Disease,CAD     Cancer Paternal Grandmother         Cancer,eye cancer, type unknown     Other - See Comments No family hx of         Stroke,No FHx of CAD     Breast Cancer No family hx of         Cancer-breast       SOCIAL HISTORY:  Social History     Tobacco Use     Smoking status: " "Never Smoker     Smokeless tobacco: Never Used   Substance Use Topics     Alcohol use: No     Alcohol/week: 0.0 standard drinks     Drug use: No     Comment: Drug use: No       ROS:   A comprehensive 10 point review of systems negative other than as mentioned in HPI.  Exam:  Initial /70 (BP Location: Right arm, Patient Position: Sitting, Cuff Size: Adult Large)   Pulse 60   Temp 96.9  F (36.1  C)   Resp 16   Ht 1.727 m (5' 8\")   SpO2 96%   BMI 28.89 kg/m   Estimated body mass index is 28.89 kg/m  as calculated from the following:Height as of this encounter: 1.727 m (5' 8\"). Weight as of 9/11/19: 86.2 kg (190 lb).   Exam assisted by local RN  GENERAL APPEARANCE: alert and no distress  HEENT: MMM. PERRLA.   NECK: supple.   RESPIRATORY: lungs clear to auscultation - no rales, rhonchi or wheezes, no use of accessory muscles, no retractions, respirations are unlabored, normal respiratory rate  CARDIOVASCULAR: regular rhythm, S1/S2, no murmur.   ABDOMEN: soft, non tender, bowel sounds normal, non-distended  EXTREMITIES: peripheral pulses normal, no edema  NEURO: alert and oriented to person/place/time, normal speech, and affect  SKIN: no ecchymoses, no rashes  PSYCH: normal affect, cooperative    Labs:  Reviewed.     Testing/Procedures:  4/2016 ECHOCARDIOGRAM ([facility] REPORT):     Final Impressions:   1. Normal left ventricular size, normal wall thickness, normal global systolic function, calculated EF of 68 %.   2. Right ventricular cavity size is normal, global systolic RV function is normal.   3. The mitral valve is normal, trace mitral regurgitation.    Assessment and Plan:   Ms. Rojas is a 59 year old female who has a past medical history significant for HLD, and PAF (CHADSVASC 0). She was referred today for AF management. She states she has had palpitations or fluttering in her chest since high school. In 2013, she had an episode of palpitations lasting longer then an hour for which she went into a local " ER. By the time she had gotten to the ER her symptoms had abated and she was in sinus. No further intervention was done. Then in 2016, she had an episode of palpitations associated with presyncope for which she presented to her local ER again and was found to be in AF. She was started on diltiazem and was discharged home. She reported some possible side effects that she contributed to diltiazem and has taken it only as needed very sparingly. She continued to have further episodes lasting up to a few hours at a time. She was previously recommended to be on ASA, but was concerned about potential side effects. She was seen by Dr. Flores at [facility] in 2016 and plans were made for an ablation. Her symptoms improved so she deferred ablation at that time. Echo from 2016 showed normal structure and function. She more recently saw Dr. Parmar and reported recurrent palpitations/AF symptoms. She presents today for follow up. She reports feeling well.She has not had any significant AF symptoms since last visit. She denies any chest pain/pressures, dizziness, lightheadedness, worsening shortness of breath, leg/ankle swelling, PND, orthopnea, palpitations, or syncopal symptoms.  Current cardiac medication: diltiazem PRN.    Paroxsymal Atrial Fibrillation:   We discussed in detail with the patient management/treatment options for Isabella includin. Stroke Prophylaxis:  CHADSVASC=  0 (gender non-contributory if only risk factor), corresponding to a 0.2-0.5% annual stroke / systemic emolism event rate. Indicating NO need for long term oral anticoagulation. Discussed she can be on ASA 81 mg daily or nothing. We would want to consider anticoagulation when she reaches age 63 or if she develops other risk factors prior.   2. Rate Control: Continue diltiazem PRN.   3. Rhythm Control: She does have documented AF episode back in 2016. Recent Zio patch monitor confirmed 2% AF burden. We discussed use of AAT vs ablation. Discussed that she  does not have any preclusions to AATs and we tend to favor Flecainide or Sotalol as first line agents. We also discussed role of ablation. The procedural risk of EP study and ablation were discussed in detail. Risks discussed include: vascular complications, CVA, AVB, pericardial effusion and tamponade, esophageal fistula, PV stenosis. AF ablation has 80% success at 1 year, 50% success at 5 years, and 30% need another ablation. She states understanding. She wants to continue with current conservative management.     4. Risk Factor Management: maintain good BP control, RD evaluation- she reports some snoring and had an incomplete RD test before and saw sleep medicine since last visit and was deemed not to have RD.      Follow up via telemedicine in 1 year.     Mele Villegas MD [facility]  Cardiology - Electrophysiology    CC  AGUSTÍN ROCHE

## 2019-12-16 NOTE — NURSING NOTE
"No chief complaint on file.      Initial There were no vitals taken for this visit. Estimated body mass index is 28.89 kg/m  as calculated from the following:    Height as of an earlier encounter on 12/16/19: 1.727 m (5' 8\").    Weight as of 9/11/19: 86.2 kg (190 lb).  Meds Reconciled: complete  Pt is on Aspirin  Pt is not on a Statin  PHQ and/or MARIO reviewed. Pt referred to PCP/MH Provider as appropriate.    Delilah Nieto RN        "

## 2019-12-16 NOTE — NURSING NOTE
"Consult with Dr. Villegas.  History of AFIB. Denies chest pain or pressure, fatigue, dizziness, shortness of breath or syncope.  Had an episode last week of Afib last about 1-2 hours, had near syncope a few seconds.  Lung sound clear bilateral.  Delilah Nieto RN on 12/16/2019 at 8:15 AM     Chief Complaint   Patient presents with     Telemedicine consult       Initial /70 (BP Location: Right arm, Patient Position: Sitting, Cuff Size: Adult Large)   Pulse 60   Temp 96.9  F (36.1  C)   Resp 16   Ht 1.727 m (5' 8\")   SpO2 96%   BMI 28.89 kg/m   Estimated body mass index is 28.89 kg/m  as calculated from the following:    Height as of this encounter: 1.727 m (5' 8\").    Weight as of 9/11/19: 86.2 kg (190 lb).  Meds Reconciled: complete  Pt is on Aspirin  Pt is not on a Statin  PHQ and/or MARIO reviewed. Pt referred to PCP/MH Provider as appropriate.    Delilah Nieto RN     Plan:  Follow up in 1 year.      "

## 2019-12-20 ENCOUNTER — TELEPHONE (OUTPATIENT)
Dept: CARDIOLOGY | Facility: OTHER | Age: 59
End: 2019-12-20
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
## 2019-12-20 NOTE — TELEPHONE ENCOUNTER
Aric Parmar, Delilah Molina RN   Caller: Unspecified (Today,  2:23 PM)             Dizziness/this type of problem is very difficult to diagnosis over the phone.  She would have to be seen either in the ER/urgent care or make an apt for F/U.       Dr. Parmar      Called patient she states she will make a follow up appointment with Dr. Parmar or if symptoms come back will be seen in Er or urgent care.  Delilah Nieto, MIRANDA on 12/20/2019 at 4:02 PM

## 2019-12-24 ENCOUNTER — TELEPHONE (OUTPATIENT)
Dept: CARDIOLOGY | Facility: OTHER | Age: 59
End: 2019-12-24

## 2019-12-24 NOTE — TELEPHONE ENCOUNTER
States she has called regarding this before. She said that something feels off either in her head or eyes. She said she feels dizzy sometimes and would like to know what to do. Appt line was offered but she didn't want to have to come in

## 2019-12-24 NOTE — TELEPHONE ENCOUNTER
After verification, patient informed of needing to be seen per Dr Parmar on 12/20/19.  She said she is going to eye doctor today at 11 am and will see if they think she needs to go [facility].  She made appointment with Dr Parmar on 1/7//20.  Mae Teague LPN ....................12/24/2019   8:35 AM

## 2020-01-09 ENCOUNTER — OFFICE VISIT (OUTPATIENT)
Dept: CARDIOLOGY | Facility: OTHER | Age: 60
End: 2020-01-09
Attending: INTERNAL MEDICINE
Payer: COMMERCIAL

## 2020-01-09 VITALS
SYSTOLIC BLOOD PRESSURE: 124 MMHG | DIASTOLIC BLOOD PRESSURE: 80 MMHG | RESPIRATION RATE: 18 BRPM | HEART RATE: 68 BPM | TEMPERATURE: 97 F | OXYGEN SATURATION: 97 %

## 2020-01-09 DIAGNOSIS — I48.0 PAF (PAROXYSMAL ATRIAL FIBRILLATION) (H): ICD-10-CM

## 2020-01-09 DIAGNOSIS — R03.0 ELEVATED BP WITHOUT DIAGNOSIS OF HYPERTENSION: ICD-10-CM

## 2020-01-09 DIAGNOSIS — I49.3 VENTRICULAR ECTOPY: ICD-10-CM

## 2020-01-09 DIAGNOSIS — R00.2 PALPITATIONS: ICD-10-CM

## 2020-01-09 DIAGNOSIS — R42 DIZZINESS: Primary | ICD-10-CM

## 2020-01-09 DIAGNOSIS — G43.109 OCULAR MIGRAINE: ICD-10-CM

## 2020-01-09 DIAGNOSIS — E78.2 MIXED HYPERLIPIDEMIA: ICD-10-CM

## 2020-01-09 DIAGNOSIS — I49.1 PAC (PREMATURE ATRIAL CONTRACTION): ICD-10-CM

## 2020-01-09 PROCEDURE — 99214 OFFICE O/P EST MOD 30 MIN: CPT | Performed by: INTERNAL MEDICINE

## 2020-01-09 ASSESSMENT — PAIN SCALES - GENERAL: PAINLEVEL: NO PAIN (0)

## 2020-01-09 NOTE — PROGRESS NOTES
Cardiology Progress Note     Assessment & Plan   Felicitas Rojas is a 59 year old female who is being seen in follow-up to a visit from 1/17/19.    She has been having a fluttering in her chest since high school.  These palpitations have been mild.  She has seen Dr. Troy initially on 5/19/16 after reporting to the ER for persistent palpitations with retrosternal chest pain.  A. fib was documented with on the EKG.  She was briefly admitted to the hospital.  Her CHADSVASC score is 0 and it was suggest that she take aspirin 81 mg daily and diltiazem 60 mg as needed for palpitations.  She has been reluctant to take aspirin and diltiazem secondary to the side effects.  She has been researching foods and triggers for atrial fibrillation and feels that the gas fumes from her lawn more, MSG, caffeine, aspartame, ice cream, carbs, fasting, a lack of sleep amongst other things seem to trigger atrial fibrillation.  She is rather certain when she is in atrial fibrillation as she has symptoms.  She has an EKG from 5/10/16 that supports atrial fibrillation.  She had a Zio patch completed on 7/1/16 that was negative for atrial fibrillation.  She was identified as having atrial flutter on 4/3/16.  She has since taken herself off aspirin secondary to her potential side effects and has had to use diltiazem 60 mg short acting minimally which she thinks she has had minimal benefit.  She has been hesitant to consider an ablation, use medications, antiarrhythmics, blood thinning medications and most certainly Coumadin.  She is aware that as she gets older, she will need more aggressive anticoagulation than aspirin which would include a NOAC or Coumadin.     We had a long discussion about atrial fibrillation today.  The heart model in the room was used to explain what atrial fibrillation and where ablations are performed.  She has more understanding of the treatment options and is wishing to follow-up with electrophysiology.  It  "was suggest that she see Dr. Villegas via telemedicine rather than traveling to the [facility] and being seen by EP through [facility].    1/9/2020:  She has been seen Dr. Villegas with her most recent visit on 12/16/2019.  She had been doing fine. However, she had an episode on 12/20/2019 of \"dizziness\" while driving, x2.  She describes her symptoms as lasting a second or 2.  She had looked at the stoplight while driving and looked down and had a brief episode.  She had a recurrence at the next stoplight.  She may have had 1 or 2 episodes since.  She denies palpitations with the symptoms.  She has had no other neurological deficit.  She does have a history of ocular migraines.  Her symptoms were similar to her previous ocular migraines.  We discussed doing a work-up but being that her symptoms are short-lived and mild, work-up declined.  She did get glasses recently which may be contributing to the situation.    Impression:  1.  Paroxysmal atrial fib.  2.  Hyperlipidemia.  3.  Elevated blood pressure without diagnosis of hypertension.  4.  PAC's  5.  Ventricular ectopy  6.  \"Dizziness\".  7.  H/O ocular migraines.  8.  CHADSVASC score is 0.    Plan:  1.  She describes transient \"dizziness\" lasting approximately a minute.  She denies palpitations with these episodes.  To be honest, I am uncertain what is causing the symptoms at this point but they have been few and far between.  She does not feel like she is in A. fib.  She recently had her eyes checked and is being provided glasses since these episodes were short and with minimal reoccurrence without other substantial concerning symptoms, it was decided we would watch her symptoms.  She will attempt to wear glasses to see if this alleviates her symptoms.  2.  Follow-up in 1 year or sooner with issues.          Aric Parmar        Physical Exam   Temp: 97  F (36.1  C) Temp src: Tympanic BP: 124/80 Pulse: 68   Resp: 18 SpO2: 97 %      Vitals:     Vital Signs with Ranges  Temp: "  [97  F (36.1  C)] 97  F (36.1  C)  Pulse:  [68] 68  Resp:  [18] 18  BP: (124)/(80) 124/80  SpO2:  [97 %] 97 %  ROS negative except that which was noted in the HPI.    Incision/Surgical Site 08/30/19 Abdomen (Active)   Closure Approximated;Sutures;Adhesive strip(s) 8/30/2019  1:08 PM   Dressing Intervention Clean, dry, intact 8/30/2019  2:02 PM   Number of days: 132       Constitutional: awake, alert, cooperative, no apparent distress, and appears stated age  Eyes: Lids and lashes normal, pupils equal, round and reactive to light, extra ocular muscles intact, sclera clear, conjunctiva normal  ENT: Normocephalic, without obvious abnormality, atraumatic.  Respiratory: No increased work of breathing, good air exchange, clear to auscultation bilaterally, no crackles or wheezing  Cardiovascular: Normal apical impulse, regular rate and rhythm, normal S1 and S2, no S3 or S4, and no murmur noted.  Musculoskeletal: no lower extremity pitting edema present  Neurologic: Awake, alert.    Neuropsychiatric: General: normal, calm and normal eye contact    Medications         Data   No results found for this or any previous visit (from the past 24 hour(s)).  No results found for this or any previous visit (from the past 24 hour(s)).

## 2020-01-09 NOTE — NURSING NOTE
"Patient comes in for annual follow up  with dizziness.  Mae Teague LPN ....................1/9/2020   2:07 PM  Chief Complaint   Patient presents with     Follow Up     annual       Initial /80 (BP Location: Right arm, Patient Position: Sitting, Cuff Size: Adult Regular)   Pulse 68   Temp 97  F (36.1  C) (Tympanic)   Resp 18   SpO2 97%  Estimated body mass index is 28.89 kg/m  as calculated from the following:    Height as of 12/16/19: 1.727 m (5' 8\").    Weight as of 9/11/19: 86.2 kg (190 lb).  Meds Reconciled: complete  Pt is on Aspirin  Pt is not on a Statin  PHQ and/or MARIO reviewed. Pt referred to PCP/MH Provider as appropriate.    Mae Teague LPN      "

## 2020-01-17 ENCOUNTER — OFFICE VISIT (OUTPATIENT)
Dept: FAMILY MEDICINE | Facility: OTHER | Age: 60
End: 2020-01-17
Attending: FAMILY MEDICINE
Payer: COMMERCIAL

## 2020-01-17 VITALS
HEART RATE: 75 BPM | TEMPERATURE: 96.5 F | WEIGHT: 196 LBS | OXYGEN SATURATION: 97 % | HEIGHT: 68 IN | DIASTOLIC BLOOD PRESSURE: 80 MMHG | BODY MASS INDEX: 29.7 KG/M2 | RESPIRATION RATE: 16 BRPM | SYSTOLIC BLOOD PRESSURE: 122 MMHG

## 2020-01-17 DIAGNOSIS — Z12.11 SPECIAL SCREENING FOR MALIGNANT NEOPLASMS, COLON: ICD-10-CM

## 2020-01-17 DIAGNOSIS — D22.9 BENIGN PIGMENTED MOLE: ICD-10-CM

## 2020-01-17 DIAGNOSIS — Z00.00 ROUTINE GENERAL MEDICAL EXAMINATION AT A HEALTH CARE FACILITY: Primary | ICD-10-CM

## 2020-01-17 DIAGNOSIS — H81.13 BENIGN PAROXYSMAL POSITIONAL VERTIGO DUE TO BILATERAL VESTIBULAR DISORDER: ICD-10-CM

## 2020-01-17 PROCEDURE — 88142 CYTOPATH C/V THIN LAYER: CPT | Performed by: FAMILY MEDICINE

## 2020-01-17 PROCEDURE — G0123 SCREEN CERV/VAG THIN LAYER: HCPCS | Performed by: FAMILY MEDICINE

## 2020-01-17 PROCEDURE — 99396 PREV VISIT EST AGE 40-64: CPT | Performed by: FAMILY MEDICINE

## 2020-01-17 PROCEDURE — G0124 SCREEN C/V THIN LAYER BY MD: HCPCS | Performed by: FAMILY MEDICINE

## 2020-01-17 PROCEDURE — 87624 HPV HI-RISK TYP POOLED RSLT: CPT | Mod: ZL | Performed by: FAMILY MEDICINE

## 2020-01-17 PROCEDURE — 99213 OFFICE O/P EST LOW 20 MIN: CPT | Mod: 25 | Performed by: FAMILY MEDICINE

## 2020-01-17 ASSESSMENT — MIFFLIN-ST. JEOR: SCORE: 1502.55

## 2020-01-17 ASSESSMENT — PAIN SCALES - GENERAL: PAINLEVEL: NO PAIN (0)

## 2020-01-17 NOTE — LETTER
Felicitas Rojas  [address]  [facility] [place] [address]    2/7/2020      Dear Ms. Rojas,      I wanted to let you know about your recent pap smear. It resulted as normal.    Please contact us at [phone] with any questions or concerns that you have.    I have attached your lab results for your records.        Sincerely,       Theresa Thomas MD     Resulted Orders   Pap Screen Thin Prep   Result Value Ref Range    PAP OTHER-NIL, See Result     Copath Report         Patient Name: FELICITAS ROJAS  MR#: [id number]  Specimen #: [id number]  Collected: 1/17/2020  Received: 1/27/2020  Reported: 1/31/2020 09:54  Ordering Phy(s): THERESA THOMAS    For improved result formatting, select 'View Enhanced Report Format' under   Linked Documents section.    SPECIMEN/STAIN PROCESS:  Thin Prep Pap Screen - GICH (ThinPrep)       Pap Stain (GICH) x 1    SOURCE: Cervical, endocervical  ----------------------------------------------------------------   Thin Prep Pap Screen - GICH (ThinPrep)  SPECIMEN ADEQUACY:  Satisfactory for evaluation.  -Transformation zone component present.    CYTOLOGIC INTERPRETATION:    Other: Negative for Intraepithelial Lesion or Malignancy, See   interpretation/Result.         -Endometrial evaluation is recommended in postmenopausal women.    Endometrial cells present in a woman 45 years of age or older     -Endometrial cells in women 45 years of age or older may be associated   with benign endometrium, hormonal  alterations, and , less commonly, endometrial or uterine abnormalities.    Electronically signed out by:    Ramón Serna M.D., PhD    Processed and screened at [facility],   [facility]    CLINICAL HISTORY:  LMP: N/A  Post Menopausal, Previous normal pap  Date of Last Pap: 07/2014,    Papanicolaou Test Limitations:  Cervical cytology is a screening test with   limited sensitivity; regular  screening is critical for cancer  prevention; Pap tests are primarily   effective for the diagnosis/prevention of  squamous cell carcinoma, not adenocarcinomas or other cancers.    TESTING LAB LOCATION:  [facility]  [address] [place] [id number] [phone]    COLLECTION SITE:  Client:  [facility]  Location: [facility] ([facility])       HPV High Risk Types DNA Cervical   Result Value Ref Range    HPV Source ThinPrep       Comment:      CORRECTED ON 02/05 AT 0806: PREVIOUSLY REPORTED AS [facility]    HPV 16 DNA Negative NEG^Negative    HPV 18 DNA Negative NEG^Negative    Other HR HPV Negative NEG^Negative    Final Diagnosis This patient's sample is negative for HPV DNA.       Comment:      (Note)  METHODOLOGY:  The Roche shakira 4800 system uses automated extraction,   simultaneous amplification of HPV (L1 region) and beta-globin,    followed by  real time detection of fluorescent labeled HPV and beta   globin using specific oligonucleotide probes . The test specifically   identifies types HPV 16 DNA and HPV 18 DNA while concurrently   detecting the rest of the high risk types (31, 33, 35, 39, 45, 51,   52, 56, 58, 59, 66 or 68).  COMMENTS:  This test is not intended for use as a screening device   for women under age 30 with normal cervical cytology.  Results should   be correlated with cytologic and histologic findings. Close clinical   followup is recommended.      Specimen Description Cervical Cells       Comment:      [id number]

## 2020-01-17 NOTE — PROGRESS NOTES
"Nursing Notes:   Ramona William LPN  1/17/2020  1:11 PM  Sign at exiting of workspace  Chief Complaint   Patient presents with     Physical     PCP was Rosa Conde. Would like to discuss options for colonoscopy. Sees Dr. Parmar here for cardiac complications. Due for mammogram, pap and colonoscopy.     Ramona William LPN     SUBJECTIVE:   CC: Felicitas Rojas is an 59 year old woman who presents for preventive health visit.     Healthy Habits:    Do you get at least three servings of calcium containing foods daily (dairy, green leafy vegetables, etc.)? yes    Amount of exercise or daily activities, outside of work: limited    Problems taking medications regularly No    Medication side effects: No    Have you had an eye exam in the past two years? Yes- does need a stronger prescription- glasses have been ordered    Do you see a dentist twice per year? yes    Do you have sleep apnea, excessive snoring or daytime drowsiness?no    Last pap 7/2014- normal  Mammo 1/2018- normal  Mole on forehead- Gama's procedure - no other lesions removed    - hx GI bleed from polyp removal during colonoscopy- she is apprehensive about having this procedure done  No FHx of CoCA.  She prefers to go a cologuard annually.     # Dizziness- Episodes during the past year. Intermittent. Random, but also has some triggered by head positional changes, like looking at her armpits in the shower to shave. She was evaluated by Dr. Parmar on 1/9/2020 for this same issue. They decided to update her glasses, then reassess her symptoms after correcting her vision. Hx of occular migraines.     PMHx:   1.  Paroxysmal atrial fib.  2.  Hyperlipidemia.  3.  Elevated blood pressure without diagnosis of hypertension. (only in A fib)  4.  PAC's  5.  Ventricular ectopy  6.  \"Dizziness\".  7.  H/O ocular migraines.  8.  CHADSVASC score is 0.    Today's PHQ-2 Score:   PHQ-2 ( 1999 Pfizer) 1/17/2020 1/9/2020   Q1: Little interest or pleasure in doing things 0 " 0   Q2: Feeling down, depressed or hopeless 0 0   PHQ-2 Score 0 0     Abuse: Current or Past(Physical, Sexual or Emotional)- No  Do you feel safe in your environment? Yes    Have you ever done Advance Care Planning? (For example, a Health Directive, POLST, or a discussion with a medical provider or your loved ones about your wishes):  No, plans to review.    Social History     Tobacco Use     Smoking status: Never Smoker     Smokeless tobacco: Never Used   Substance Use Topics     Alcohol use: No     Alcohol/week: 0.0 standard drinks     If you drink alcohol do you typically have >3 drinks per day or >7 drinks per week? No                     Reviewed orders with patient.  Reviewed health maintenance and updated orders accordingly - Yes  Lab work is in process    Mammogram Screening: Patient over age 50, mutual decision to screen reflected in health maintenance.    Pertinent mammograms are reviewed under the imaging tab.  History of abnormal Pap smear: NO - age 30-65 PAP every 5 years with negative HPV co-testing recommended     Reviewed and updated as needed this visit by clinical staff  Tobacco  Allergies  Meds       Reviewed and updated as needed this visit by Provider        Past Medical History:   Diagnosis Date     Asymptomatic menopausal state     7/17/2014     BCC (basal cell carcinoma), face 3/23/2019    Left forehead lesion biopsy positive BCC--[facility] exam [facility]--scheduled MOHS excision procedure 4/19 [facility] 3/21/19 [facility]     Personal history of other medical treatment (CODE)     L5-S1     Personal history of other medical treatment (CODE)     9/13/2010,R knee; chronic mild problems        Current Outpatient Medications   Medication     aspirin (ASA) 81 MG chewable tablet     diltiazem (CARDIZEM) 60 MG tablet     No current facility-administered medications for this visit.    Cardizem as needed for Afib symptoms    ROS:  Constitutional, HEENT, cardiovascular,  "pulmonary, GI, , musculoskeletal, neuro, skin, endocrine and psych systems are negative, except as otherwise noted.    OBJECTIVE:   /80 (BP Location: Right arm, Patient Position: Sitting, Cuff Size: Adult Regular)   Pulse 75   Temp 96.5  F (35.8  C) (Tympanic)   Resp 16   Ht 1.727 m (5' 8\")   Wt 88.9 kg (196 lb)   SpO2 97%   Breastfeeding No   BMI 29.80 kg/m    EXAM:  CONSTITUTIONAL:  Alert, cooperative, NAD.  EYES: No scleral icterus.  PERRLA.  Conjunctiva clear.  ENT/MOUTH: External ears and nose normal.  TMs normal.  Moist mucous membranes. Oropharynx clear.    ENDO: No thyromegaly or thyroid nodules.  LYMPH:  No cervical or supraclavicular LA.    BREASTS: No skin abnormalities, no erythema.  No discrete masses.  No nipple discharge, no axillary, supra- or infraclavicular LA. Moles, <5mm, and cherry angiomas.   CARDIOVASCULAR: Regular, S1, S2.  No S3 or S4. No peripheral edema.  RESPIRATORY: CTA bilaterally, no wheezes, rhonchi or rales.  GI: Bowel sounds wnl.  Soft, nontender, nondistended.  No masses or HSM.  No rebound or guarding.  : Vulva: normal, no lesions or discharge  Urethral meatus: normal size and location, no lesions or discharge  Urethra: no tenderness or masses  Bladder: no fullness or tenderness  Vagina: normal appearance, no abnormal discharge, no lesions.  No evidence of cystocele or rectocele.  Cervix: Normal appearance, no lesions, no abnormal discharge, no cervical motion tenderness  Uterus: normal size and position, mobile, non-tender  Pap smear obtained: yes  Adnexa: no palpable masses bilaterally  MSKEL: Grossly normal ROM.  No clubbing.  INTEGUMENTARY:  Warm, dry.  Various light brown moles on the back, some with scaly appearances. Various smaller moles that are darker brown, all <6mm in appearance. Various cherry angiomas.  NEUROLOGIC: Facies symmetric.  Grossly normal movement and tone.  No tremor.  PSYCHIATRIC: Affect normal.  Speech fluent.  Though content " "linear.    ASSESSMENT/PLAN:   1. Routine general medical examination at a health care facility  - Pap Screen Thin Prep  - Lipid Profile; Future    2. Benign paroxysmal positional vertigo due to bilateral vestibular disorder  -dizziness maybe due to BPV (especially given trigger of change in head position)  - PHYSICAL THERAPY REFERRAL; Future- for vestibular PT    3. BMI 29    4. Declines flu shot     5. Various moles on back  -not meeting criteria for biopsy  -continue to monitor during annual skin exams    COUNSELING:   Reviewed preventive health counseling, as reflected in patient instructions    Estimated body mass index is 29.8 kg/m  as calculated from the following:    Height as of this encounter: 1.727 m (5' 8\").    Weight as of this encounter: 88.9 kg (196 lb).    Weight management plan: Discussed healthy diet and exercise guidelines     reports that she has never smoked. She has never used smokeless tobacco.    Counseling Resources:  ATP IV Guidelines  Pooled Cohorts Equation Calculator  Breast Cancer Risk Calculator  FRAX Risk Assessment  ICSI Preventive Guidelines  Dietary Guidelines for Americans, 2010  USDA's MyPlate  ASA Prophylaxis  Lung CA Screening    Theresa Bird MD  [facility] AND [facility]  "

## 2020-01-17 NOTE — NURSING NOTE
Chief Complaint   Patient presents with     Physical     PCP was Rosa Conde. Would like to discuss options for colonoscopy. Sees Dr. Parmar here for cardiac complications. Due for mammogram, pap and colonoscopy.     Medication Reconciliation: complete    Ramona William LPN

## 2020-01-22 DIAGNOSIS — Z00.00 ROUTINE GENERAL MEDICAL EXAMINATION AT A HEALTH CARE FACILITY: ICD-10-CM

## 2020-01-22 LAB
CHOLEST SERPL-MCNC: 242 MG/DL
HDLC SERPL-MCNC: 71 MG/DL (ref 23–92)
LDLC SERPL CALC-MCNC: 158 MG/DL (ref 0–130)
NONHDLC SERPL-MCNC: 171 MG/DL
TRIGL SERPL-MCNC: 63 MG/DL

## 2020-01-22 PROCEDURE — 36415 COLL VENOUS BLD VENIPUNCTURE: CPT | Mod: ZL | Performed by: FAMILY MEDICINE

## 2020-01-22 PROCEDURE — 80061 LIPID PANEL: CPT | Mod: ZL | Performed by: FAMILY MEDICINE

## 2020-01-24 ENCOUNTER — HOSPITAL ENCOUNTER (OUTPATIENT)
Dept: MAMMOGRAPHY | Facility: OTHER | Age: 60
Discharge: HOME | End: 2020-01-24
Attending: FAMILY MEDICINE | Admitting: FAMILY MEDICINE
Payer: COMMERCIAL

## 2020-01-24 DIAGNOSIS — Z12.31 VISIT FOR SCREENING MAMMOGRAM: ICD-10-CM

## 2020-01-24 PROCEDURE — 77067 SCR MAMMO BI INCL CAD: CPT

## 2020-01-31 LAB
COPATH REPORT: NORMAL
PAP: NORMAL

## 2020-02-04 ENCOUNTER — HOSPITAL ENCOUNTER (OUTPATIENT)
Dept: PHYSICAL THERAPY | Facility: OTHER | Age: 60
Setting detail: THERAPIES SERIES
End: 2020-02-04
Attending: FAMILY MEDICINE
Payer: COMMERCIAL

## 2020-02-04 DIAGNOSIS — H81.13 BENIGN PAROXYSMAL POSITIONAL VERTIGO DUE TO BILATERAL VESTIBULAR DISORDER: ICD-10-CM

## 2020-02-04 PROCEDURE — 97163 PT EVAL HIGH COMPLEX 45 MIN: CPT | Mod: GP | Performed by: PHYSICAL THERAPIST

## 2020-02-04 NOTE — PROGRESS NOTES
02/04/20 1300   Quick Adds   Quick Adds Vestibular Eval   Type of Visit Initial OP PT Evaluation   General Information   Start of Care Date 02/04/20   Referring Physician Dr. Bird   Orders Evaluate and Treat as Indicated   Order Date 01/17/20   Medical Diagnosis bilateral vestibular disorder   Onset of illness/injury or Date of Surgery 01/09/20   Precautions/Limitations no known precautions/limitations   Pertinent history of current vestibular problem (include personal factors and/or comorbidities that impact the POC)  Migraines  (occular migraines (12-15 y ago))   Pertinent history of current problem (include personal factors and/or comorbidities that impact the POC) Patient reports she has been having intermittent episodes of vertigo over the past year.  Was seen by Dr. Parmar on 1/9/20 and was suggested to have vision corrected.  Reports symptoms of head spinning, feels like it is inside head/behind eyes.  Sometimes with minimal head motion, and eye motion.  Episodes are very brief.  Has gotten glasses.  Denies any symptoms with several examples of position changes.  Patient also reports low back and neck issues that she is being seen for by a chiropractor x 15-20 years.  Visits more regularly now, weekly for a while, and now every other week.     Pertinent Visual History  Recently prescribed glasses.  No eye dysfunction.     Prior level of function comment No limitations.   Patient role/Employment history Employed  (full-time, computer work mostly)   Fall Risk Screen   Fall screen completed by PT   Have you fallen 2 or more times in the past year? No   Have you fallen and had an injury in the past year? No   Is patient a fall risk? No   Balance Special Tests Modified CTSIB Conditions   Condition 1, seconds 30 Seconds   Condition 2, seconds 30 Seconds   Condition 4, seconds 30 Seconds   Condition 5, seconds 30 Seconds   Modified CTSIB Comments Minimal to no sway in all positions.   Oculomotor Exam    Smooth Pursuit Normal   Saccades Normal   VOR Normal   VOR Cancellation Normal   Rapid Head Thrust Corrective Saccade L head thrust   Convergence Testing Abnormal   Convergence Testing Comments R eye limited convergence, approximately 12 inches   Infrared Goggle Exam or Frenzel Lense Exam   Vestibular Suppressant in Last 24 Hours? No   Exam completed with Frenzel Lenses   Spontaneous Nystagmus Negative   Gaze Evoked Nystagmus Negative   Alejandro-Hallpike (right) Negative   Alejandro-Hallpike (Left) Negative   HSCC Supine Roll Test (Right) Negative   HSCC Supine Roll Test (Left) Negative   Clinical Impression   Criteria for Skilled Therapeutic Interventions Met evaluation only   Clinical Presentation Unstable/Unpredictable   Clinical Presentation Rationale clinical observation   Clinical Decision Making (Complexity) High complexity   Clinical Impression Comments Negative positional testing today.  Positive L Head thrust test indicating possible L vestibular hypofunction but symptoms not really consistent with this.  History of occular migraine.  Possible vestibular migraine over the past couple of months.  Hopeful for improvement with recent vision correction.  Recommended follow up with physician if symptoms continue or get worse.  Do not recommend physical therapy at this time.   Total Evaluation Time   PT Mary Alice High Complexity Minutes ([id number]) 40

## 2020-02-05 LAB
FINAL DIAGNOSIS: NORMAL
HPV HR 12 DNA CVX QL NAA+PROBE: NEGATIVE
HPV16 DNA SPEC QL NAA+PROBE: NEGATIVE
HPV18 DNA SPEC QL NAA+PROBE: NEGATIVE
SPECIMEN DESCRIPTION: NORMAL
SPECIMEN SOURCE CVX/VAG CYTO: NORMAL

## 2020-02-06 LAB — COLOGUARD-ABSTRACT: POSITIVE

## 2020-02-10 DIAGNOSIS — R19.5 POSITIVE COLORECTAL CANCER SCREENING USING COLOGUARD TEST: Primary | ICD-10-CM

## 2020-02-20 ENCOUNTER — HOSPITAL ENCOUNTER (OUTPATIENT)
Facility: OTHER | Age: 60
End: 2020-02-20
Attending: SURGERY | Admitting: SURGERY
Payer: COMMERCIAL

## 2020-02-20 DIAGNOSIS — Z12.11 ENCOUNTER FOR SCREENING COLONOSCOPY: Primary | ICD-10-CM

## 2020-02-20 NOTE — TELEPHONE ENCOUNTER
Screening Questions for the Scheduling of Screening Colonoscopies   (If Colonoscopy is diagnostic, Provider should review the chart before scheduling.)  Are you younger than 50 or older than 80?  NO  Do you take aspirin or fish oil?  YES - ASPIRIN (if yes, tell patient to stop 1 week prior to Colonoscopy)  Do you take warfarin (Coumadin), clopidogrel (Plavix), apixaban (Eliquis), dabigatram (Pradaxa), rivaroxaban (Xarelto) or any blood thinner? NO  Do you use oxygen at home?  NO  Do you have kidney disease? NO  Are you on dialysis? NO  Have you had a stroke or heart attack in the last year? NO  Have you had a stent in your heart or any blood vessel in the last year? NO  Have you had a transplant of any organ? NO  Have you had a colonoscopy or upper endoscopy (EGD) before? NO         When?  NEVER  Date of scheduled Colonoscopy. 3/19/2020  Provider RIZZO  [facility]    THIS PATIENT HAS MANY MANY QUESTIONS AND WOULD LIKE THE NURSE TO CALL HER TO DISCUSS.  THANK YOU.

## 2020-02-21 RX ORDER — BISACODYL 5 MG/1
TABLET, DELAYED RELEASE ORAL
Qty: 2 TABLET | Refills: 0 | Status: SHIPPED | OUTPATIENT
Start: 2020-02-21 | End: 2021-02-26

## 2020-02-21 RX ORDER — POLYETHYLENE GLYCOL 3350, SODIUM CHLORIDE, SODIUM BICARBONATE, POTASSIUM CHLORIDE 420; 11.2; 5.72; 1.48 G/4L; G/4L; G/4L; G/4L
4000 POWDER, FOR SOLUTION ORAL ONCE
Qty: 4000 ML | Refills: 0 | Status: SHIPPED | OUTPATIENT
Start: 2020-02-21 | End: 2020-02-21

## 2020-03-10 RX ORDER — ASPIRIN 81 MG/1
81 TABLET ORAL DAILY
COMMUNITY
End: 2020-03-16 | Stop reason: HOSPADM

## 2020-03-11 ENCOUNTER — HEALTH MAINTENANCE LETTER (OUTPATIENT)
Age: 60
End: 2020-03-11

## 2020-03-13 DIAGNOSIS — Z12.11 ENCOUNTER FOR SCREENING COLONOSCOPY: ICD-10-CM

## 2020-03-17 RX ORDER — POLYETHYLENE GLYOCOL 3350, SODIUM CHLORIDE, SODIUM BICARBONATE AND POTASSIUM CHLORIDE 420; 11.2; 5.72; 1.48 G/4L; G/4L; G/4L; G/4L
POWDER, FOR SOLUTION NASOGASTRIC; ORAL
Refills: 0 | OUTPATIENT
Start: 2020-03-17

## 2020-03-17 NOTE — TELEPHONE ENCOUNTER
Requested Prescriptions   Pending Prescriptions Disp Refills     TRILYTE 420 g solution [Pharmacy Med Name: TRI-LYTE W/FLAVOR PACK FOR SOL]  0     Sig: TAKE 4,000 MLS BY MOUTH ONCE FOR 1 DOSE       There is no refill protocol information for this order      Looks like 03/19/2020 colonoscopy was cancelled.  Will send to surgical nurse.  Delilah Nieto RN on 3/17/2020 at 3:43 PM

## 2020-03-17 NOTE — TELEPHONE ENCOUNTER
Colonoscopy cancelled until further notice due to COVID-19 protocol.  Hue Delgadillo LPN........................3/17/2020  4:11 PM

## 2020-12-04 ENCOUNTER — TELEPHONE (OUTPATIENT)
Dept: CARDIOLOGY | Facility: CLINIC | Age: 60
End: 2020-12-04

## 2020-12-04 NOTE — TELEPHONE ENCOUNTER
Talked with patient she would like to hold off until later January 2021 to make follow up with Dr. Villegas Telemedicine visit.  Will let Elle Bailey CNP know this.  Patient was due at this time in December 2020. Will call her in January 2021. Delilah Nieto RN on 12/15/2020 at 10:47 AM    Left message on machine to call back.  Delilah Nieto RN on 12/10/2020 at 2:01 PM      Calling patient to set up a follow up appointment with Dr. Villegas.  Left message on machine to call back.  Delilah Nieto RN on 12/4/2020 at 8:25 AM

## 2021-01-03 ENCOUNTER — HEALTH MAINTENANCE LETTER (OUTPATIENT)
Age: 61
End: 2021-01-03

## 2021-01-19 ENCOUNTER — TELEPHONE (OUTPATIENT)
Dept: CARDIOLOGY | Facility: CLINIC | Age: 61
End: 2021-01-19

## 2021-01-19 NOTE — TELEPHONE ENCOUNTER
Called patient set her up for a telemedicine with Dr. Villegas for 02/26/2021 at 0830 with nurse and 0900 with Dr. Villegas.  Patient is aware of date and time and verbalized understanding.  Delilah Nieto RN on 1/19/2021 at 10:47 AM

## 2021-02-26 ENCOUNTER — ALLIED HEALTH/NURSE VISIT (OUTPATIENT)
Dept: CARDIOLOGY | Facility: OTHER | Age: 61
End: 2021-02-26
Attending: INTERNAL MEDICINE
Payer: COMMERCIAL

## 2021-02-26 ENCOUNTER — VIRTUAL VISIT (OUTPATIENT)
Dept: CARDIOLOGY | Facility: CLINIC | Age: 61
End: 2021-02-26
Attending: INTERNAL MEDICINE
Payer: COMMERCIAL

## 2021-02-26 VITALS
WEIGHT: 190 LBS | DIASTOLIC BLOOD PRESSURE: 80 MMHG | SYSTOLIC BLOOD PRESSURE: 122 MMHG | BODY MASS INDEX: 28.89 KG/M2 | HEART RATE: 62 BPM | RESPIRATION RATE: 16 BRPM | OXYGEN SATURATION: 97 %

## 2021-02-26 VITALS
OXYGEN SATURATION: 97 % | HEART RATE: 62 BPM | SYSTOLIC BLOOD PRESSURE: 122 MMHG | BODY MASS INDEX: 28.89 KG/M2 | WEIGHT: 190 LBS | RESPIRATION RATE: 16 BRPM | DIASTOLIC BLOOD PRESSURE: 80 MMHG

## 2021-02-26 DIAGNOSIS — I48.0 PAF (PAROXYSMAL ATRIAL FIBRILLATION) (H): ICD-10-CM

## 2021-02-26 DIAGNOSIS — I49.1 PAC (PREMATURE ATRIAL CONTRACTION): ICD-10-CM

## 2021-02-26 DIAGNOSIS — R00.2 PALPITATIONS: ICD-10-CM

## 2021-02-26 DIAGNOSIS — I49.3 VENTRICULAR ECTOPY: ICD-10-CM

## 2021-02-26 DIAGNOSIS — I48.0 PAF (PAROXYSMAL ATRIAL FIBRILLATION) (H): Primary | ICD-10-CM

## 2021-02-26 PROCEDURE — 99213 OFFICE O/P EST LOW 20 MIN: CPT | Performed by: INTERNAL MEDICINE

## 2021-02-26 ASSESSMENT — PAIN SCALES - GENERAL
PAINLEVEL: NO PAIN (0)
PAINLEVEL: NO PAIN (0)

## 2021-02-26 NOTE — PROGRESS NOTES
Electrophysiology Telemedicine Note  HPI:   Ms. Rojas is a 61 year old female who has a past medical history significant for HLD, and PAF (CHADSVASC 0). She was referred today for AF management.     She states she has had palpitations or fluttering in her chest since high school. In 2013, she had an episode of palpitations lasting longer then an hour for which she went into a local ER. By the time she had gotten to the ER her symptoms had abated and she was in sinus. No further intervention was done. Then in 4/2016, she had an episode of palpitations associated with presyncope for which she presented to her local ER again and was found to be in AF. She was started on diltiazem and was discharged home. She reported some possible side effects that she contributed to diltiazem and has taken it only as needed very sparingly. She continued to have further episodes lasting up to a few hours at a time. She was previously recommended to be on ASA, but was concerned about potential side effects. She was seen by Dr. Flores at [facility] in 2016 and plans were made for an ablation. Her symptoms improved so she deferred ablation at that time. Echo from 2016 showed normal structure and function. She more recently saw Dr. Parmar and reported recurrent palpitations/AF symptoms. She reported concerns about taking chronic AATs and was interested in discussion ablation again for which she was referred.    EP Visit 3/8/19: She states she is getting palpitations 1-2x/week that can last up to several hours. She takes PRN short acting diltiazem to help control her symptoms. She is not certain if the diltiazem is having much affect. She feels that gas fumes, MSG, caffeine, aspartame, ice cream, carbs, and lack of sleep can be triggers for AF episodes.  She denies any chest pain/pressures, dizziness, lightheadedness, worsening shortness of breath, leg/ankle swelling, PND, orthopnea, or syncopal symptoms. Current cardiac medication: diltiazem.      EP Visit 5/14/19: She presents today for follow up. She underwent a 2 week zio patch monitor from 3/25/19-4/8/19 which showed SR  with 2% AF up to 59m 18s and 17 patient activations often showing AF (occasionally sinus). She states she is having episodes of palpitations a few times a month. She will take diltiazem as needed for these episodes. She denies any chest pain/pressures, dizziness, lightheadedness, worsening shortness of breath, leg/ankle swelling, PND, orthopnea, or syncopal symptoms. Current cardiac medication: diltiazem.     EP Visit 12/16/19: She presents today for follow up. She reports feeling well.She has not had any significant AF symptoms since last visit. She saw sleep medicine since last visit and was deemed not to have RD. She denies any chest pain/pressures, dizziness, lightheadedness, worsening shortness of breath, leg/ankle swelling, PND, orthopnea, palpitations, or syncopal symptoms.  Current cardiac medication: diltiazem PRN.    She is following up today. She reports feeling well. She has had some PAF episodes a few times a month about 15 minutes up to 4 hours. She has not needed to take diltiazem. She denies any chest pain/pressures, dizziness, lightheadedness, worsening shortness of breath, leg/ankle swelling, PND, orthopnea, palpitations, or syncopal symptoms. Current cardiac medication: diltiazem PRN.    PAST MEDICAL HISTORY:  Past Medical History:   Diagnosis Date     Asymptomatic menopausal state     7/17/2014     BCC (basal cell carcinoma), face 3/23/2019    Left forehead lesion biopsy positive BCC--[facility]cons[facility] exam 3/19--scheduled MOHS excision procedure 4/19 [facility] 3/21/19 [facility]     Personal history of other medical treatment (CODE)     L5-S1     Personal history of other medical treatment (CODE)     9/13/2010,R knee; chronic mild problems     Positive colorectal cancer screening using Cologuard test 2/10/2020       CURRENT  "MEDICATIONS:  Current Outpatient Medications   Medication Sig Dispense Refill     bisacodyl 5 MG PO EC tablet Take as directed by colonoscopy prep instructions 2 tablet 0     diltiazem (CARDIZEM) 60 MG tablet Take 1 tablet (60 mg) by mouth 4 times daily as needed (palpitations) 30 tablet 1       PAST SURGICAL HISTORY:  Past Surgical History:   Procedure Laterality Date     ARTHROTOMY WRIST      1985,Surgery for De Quervain's tenosynovitis right wrist about 1985     FINGER SURGERY      03/06,Ulnar collateral ligament repair left thumb, Dr. Joy     LAPAROSCOPIC APPENDECTOMY N/A 8/30/2019    Procedure: APPENDECTOMY, LAPAROSCOPIC;  Surgeon: Alex Obrien MD;  Location: [facility] OR     OTHER SURGICAL HISTORY      2006,[id number],OTHER,Left,thumb fracture     OTHER SURGICAL HISTORY      1985 right & 2006 left,[id number],OTHER     OTHER SURGICAL HISTORY      01/4/02,93813.0,SKIN/SUBCUTANEOUS SURGERY,Excision of an intradermal nevus of the preauricular area       ALLERGIES:     Allergies   Allergen Reactions     Aspartame Palpitations     Triggers A-Fib     Caffeine Palpitations     Triggers A-Fib     Monosodium Glutamate Palpitations     Triggers A-Fib     Cephalexin Itching     pt wrote \"redness irritation, inflamation , pus at point of incision\"  questionable allergy     Cyclobenzaprine      Other reaction(s): Dizziness  Lightheadedness. Patient unsure if this is true.      Sulfa Drugs      Other reaction(s): *Unknown - Childhood Rxn     Sulfacetamide Unknown     Vitamin B12 Unknown     Ambien [Zolpidem]      Caused A-fib.      Vitamin B1 & B12  [Trophite] Rash     Sublingual vitamin       FAMILY HISTORY:  Family History   Problem Relation Age of Onset     Other - See Comments Mother 70        Alzheimer's     Prostate Cancer Father         Cancer-prostate     Diabetes Father         Diabetes     Other - See Comments Father         GI Disease,colon polyps     Heart Disease Father         Heart Disease,CAD     Cancer Paternal " "Grandmother         Cancer,eye cancer, type unknown     Other - See Comments No family hx of         Stroke,No FHx of CAD     Breast Cancer No family hx of         Cancer-breast       SOCIAL HISTORY:  Social History     Tobacco Use     Smoking status: Never Smoker     Smokeless tobacco: Never Used   Substance Use Topics     Alcohol use: No     Alcohol/week: 0.0 standard drinks     Drug use: No     Comment: Drug use: No       ROS:   A comprehensive 10 point review of systems negative other than as mentioned in HPI.  Exam:  Initial /80 (BP Location: Right arm, Patient Position: Sitting, Cuff Size: Adult Large)   Pulse 62   Resp 16   Wt 86.2 kg (190 lb)   SpO2 97%   BMI 28.89 kg/m   Estimated body mass index is 28.89 kg/m  as calculated from the following:    Height as of 1/17/20: 1.727 m (5' 8\").    Weight as of this encounter: 86.2 kg (190 lb).  Exam assisted by local RN  GENERAL APPEARANCE: alert and no distress  HEENT: MMM. PERRLA.   NECK: supple.   RESPIRATORY: lungs clear, respirations are unlabored, normal respiratory rate  CARDIOVASCULAR: regular rhythm, S1/S2, no murmur.   ABDOMEN: NT, ND.  EXTREMITIES: peripheral pulses normal, no edema  NEURO: A&O X4  SKIN: no ecchymoses, no rashes  PSYCH: normal affect, cooperative    Labs:  Reviewed.     Testing/Procedures:  4/2016 ECHOCARDIOGRAM ([facility] REPORT):     Final Impressions:   1. Normal left ventricular size, normal wall thickness, normal global systolic function, calculated EF of 68 %.   2. Right ventricular cavity size is normal, global systolic RV function is normal.   3. The mitral valve is normal, trace mitral regurgitation.    Assessment and Plan:   Ms. Rojas is a 61 year old female who has a past medical history significant for HLD, and PAF (CHADSVASC 0). She was referred today for AF management. She states she has had palpitations or fluttering in her chest since high school. In 2013, she had an episode of palpitations lasting longer then an " hour for which she went into a local ER. By the time she had gotten to the ER her symptoms had abated and she was in sinus. No further intervention was done. Then in 2016, she had an episode of palpitations associated with presyncope for which she presented to her local ER again and was found to be in AF. She was started on diltiazem and was discharged home. She reported some possible side effects that she contributed to diltiazem and has taken it only as needed very sparingly. She continued to have further episodes lasting up to a few hours at a time. She was previously recommended to be on ASA, but was concerned about potential side effects. She was seen by Dr. Flores at [facility] in 2016 and plans were made for an ablation. Her symptoms improved so she deferred ablation at that time. Echo from 2016 showed normal structure and function. She more recently saw Dr. Parmar and reported recurrent palpitations/AF symptoms. She presents today for follow up. She reports feeling well.She has not had any significant AF symptoms since last visit. She denies any chest pain/pressures, dizziness, lightheadedness, worsening shortness of breath, leg/ankle swelling, PND, orthopnea, palpitations, or syncopal symptoms.  Current cardiac medication: diltiazem PRN.    Paroxsymal Atrial Fibrillation:   We discussed in detail with the patient management/treatment options for Isabella includin. Stroke Prophylaxis:  CHADSVASC=  0 (gender non-contributory if only risk factor), corresponding to a 0.2-0.5% annual stroke / systemic emolism event rate. Indicating NO need for long term oral anticoagulation.   2. Rate Control: Continue diltiazem PRN.   3. Rhythm Control: She does have documented AF episode back in 2016. A Zio patch monitor confirmed 2% AF burden. We discussed use of AAT vs ablation. Discussed that she does not have any preclusions to AATs and we tend to favor Flecainide or Sotalol as first line agents. We also discussed role of  ablation. She does continue to have some PAF episodes that are self limiting. She wants to continue with current conservative management.     4. Risk Factor Management: maintain good BP control, RD evaluation- she reports some snoring and had an incomplete RD test before and saw sleep medicine since last visit and was deemed not to have RD.      She will continue to follow with Dr. Parmar and can follow up with EP as needed.   Mele Villegas MD [facility]  Cardiology - Electrophysiology    CC  AGUSTÍN PARMAR.

## 2021-02-26 NOTE — PROGRESS NOTES
"Telemedicine visit with Dr. Villegas.  Denies chest pain/pressure, shortness of breath, lightheadedness, nausea, increased fatigue, syncope or pre-syncope.  Delilah Nieto RN on 2/26/2021 at 9:15 AM  Chief Complaint   Patient presents with     Telemedicine consult       Initial /80 (BP Location: Right arm, Patient Position: Sitting, Cuff Size: Adult Large)   Pulse 62   Resp 16   Wt 86.2 kg (190 lb)   SpO2 97%   BMI 28.89 kg/m   Estimated body mass index is 28.89 kg/m  as calculated from the following:    Height as of 1/17/20: 1.727 m (5' 8\").    Weight as of this encounter: 86.2 kg (190 lb).  Meds Reconciled: complete  Pt is not on Aspirin  Pt is not on a Statin  PHQ and/or MARIO reviewed. Pt referred to PCP/MH Provider as appropriate.  Follow up with Dr. Parmar in 1 year.    Delilah Nieto RN        "

## 2021-02-26 NOTE — LETTER
2/26/2021      RE: Felicitas Rojas  [address]  [place] [address]       Dear Colleague,    Thank you for the opportunity to participate in the care of your patient, Felicitas Rojas, at the [facility] [place] at [facility]. Please see a copy of my visit note below.    Electrophysiology Telemedicine Note  HPI:   Ms. Rojas is a 61 year old female who has a past medical history significant for HLD, and PAF (CHADSVASC 0). She was referred today for AF management.     She states she has had palpitations or fluttering in her chest since high school. In 2013, she had an episode of palpitations lasting longer then an hour for which she went into a local ER. By the time she had gotten to the ER her symptoms had abated and she was in sinus. No further intervention was done. Then in 4/2016, she had an episode of palpitations associated with presyncope for which she presented to her local ER again and was found to be in AF. She was started on diltiazem and was discharged home. She reported some possible side effects that she contributed to diltiazem and has taken it only as needed very sparingly. She continued to have further episodes lasting up to a few hours at a time. She was previously recommended to be on ASA, but was concerned about potential side effects. She was seen by Dr. Flores at [facility] in 2016 and plans were made for an ablation. Her symptoms improved so she deferred ablation at that time. Echo from 2016 showed normal structure and function. She more recently saw Dr. Parmar and reported recurrent palpitations/AF symptoms. She reported concerns about taking chronic AATs and was interested in discussion ablation again for which she was referred.    EP Visit 3/8/19: She states she is getting palpitations 1-2x/week that can last up to several hours. She takes PRN short acting diltiazem to help control her symptoms. She is not certain if the  diltiazem is having much affect. She feels that gas fumes, MSG, caffeine, aspartame, ice cream, carbs, and lack of sleep can be triggers for AF episodes.  She denies any chest pain/pressures, dizziness, lightheadedness, worsening shortness of breath, leg/ankle swelling, PND, orthopnea, or syncopal symptoms. Current cardiac medication: diltiazem.     EP Visit 5/14/19: She presents today for follow up. She underwent a 2 week zio patch monitor from 3/25/19-4/8/19 which showed SR  with 2% AF up to 59m 18s and 17 patient activations often showing AF (occasionally sinus). She states she is having episodes of palpitations a few times a month. She will take diltiazem as needed for these episodes. She denies any chest pain/pressures, dizziness, lightheadedness, worsening shortness of breath, leg/ankle swelling, PND, orthopnea, or syncopal symptoms. Current cardiac medication: diltiazem.     EP Visit 12/16/19: She presents today for follow up. She reports feeling well.She has not had any significant AF symptoms since last visit. She saw sleep medicine since last visit and was deemed not to have RD. She denies any chest pain/pressures, dizziness, lightheadedness, worsening shortness of breath, leg/ankle swelling, PND, orthopnea, palpitations, or syncopal symptoms.  Current cardiac medication: diltiazem PRN.    She is following up today. She reports feeling well. She has had some PAF episodes a few times a month about 15 minutes up to 4 hours. She has not needed to take diltiazem. She denies any chest pain/pressures, dizziness, lightheadedness, worsening shortness of breath, leg/ankle swelling, PND, orthopnea, palpitations, or syncopal symptoms. Current cardiac medication: diltiazem PRN.    PAST MEDICAL HISTORY:  Past Medical History:   Diagnosis Date     Asymptomatic menopausal state     7/17/2014     BCC (basal cell carcinoma), face 3/23/2019    Left forehead lesion biopsy positive BCC--followup [facility]  "[facility]--consult skin exam 3/19--scheduled MOHS excision procedure 4/19 [facility] 3/21/19 [facility]     Personal history of other medical treatment (CODE)     L5-S1     Personal history of other medical treatment (CODE)     9/13/2010,R knee; chronic mild problems     Positive colorectal cancer screening using Cologuard test 2/10/2020       CURRENT MEDICATIONS:  Current Outpatient Medications   Medication Sig Dispense Refill     bisacodyl 5 MG PO EC tablet Take as directed by colonoscopy prep instructions 2 tablet 0     diltiazem (CARDIZEM) 60 MG tablet Take 1 tablet (60 mg) by mouth 4 times daily as needed (palpitations) 30 tablet 1       PAST SURGICAL HISTORY:  Past Surgical History:   Procedure Laterality Date     ARTHROTOMY WRIST      1985,Surgery for De Quervain's tenosynovitis right wrist about 1985     FINGER SURGERY      03/06,Ulnar collateral ligament repair left thumb, Dr. Joy     LAPAROSCOPIC APPENDECTOMY N/A 8/30/2019    Procedure: APPENDECTOMY, LAPAROSCOPIC;  Surgeon: Alex Obrien MD;  Location: [facility] OR     OTHER SURGICAL HISTORY      2006,[id number],OTHER,Left,thumb fracture     OTHER SURGICAL HISTORY      1985 right & 2006 left,[id number],OTHER     OTHER SURGICAL HISTORY      01/4/02,49901.0,SKIN/SUBCUTANEOUS SURGERY,Excision of an intradermal nevus of the preauricular area       ALLERGIES:     Allergies   Allergen Reactions     Aspartame Palpitations     Triggers A-Fib     Caffeine Palpitations     Triggers A-Fib     Monosodium Glutamate Palpitations     Triggers A-Fib     Cephalexin Itching     pt wrote \"redness irritation, inflamation , pus at point of incision\"  questionable allergy     Cyclobenzaprine      Other reaction(s): Dizziness  Lightheadedness. Patient unsure if this is true.      Sulfa Drugs      Other reaction(s): *Unknown - Childhood Rxn     Sulfacetamide Unknown     Vitamin B12 Unknown     Ambien [Zolpidem]      Caused A-fib.      Vitamin B1 & B12  [Trophite] Rash     Sublingual vitamin " "      FAMILY HISTORY:  Family History   Problem Relation Age of Onset     Other - See Comments Mother 70        Alzheimer's     Prostate Cancer Father         Cancer-prostate     Diabetes Father         Diabetes     Other - See Comments Father         GI Disease,colon polyps     Heart Disease Father         Heart Disease,CAD     Cancer Paternal Grandmother         Cancer,eye cancer, type unknown     Other - See Comments No family hx of         Stroke,No FHx of CAD     Breast Cancer No family hx of         Cancer-breast       SOCIAL HISTORY:  Social History     Tobacco Use     Smoking status: Never Smoker     Smokeless tobacco: Never Used   Substance Use Topics     Alcohol use: No     Alcohol/week: 0.0 standard drinks     Drug use: No     Comment: Drug use: No       ROS:   A comprehensive 10 point review of systems negative other than as mentioned in HPI.  Exam:  Initial /80 (BP Location: Right arm, Patient Position: Sitting, Cuff Size: Adult Large)   Pulse 62   Resp 16   Wt 86.2 kg (190 lb)   SpO2 97%   BMI 28.89 kg/m   Estimated body mass index is 28.89 kg/m  as calculated from the following:    Height as of 1/17/20: 1.727 m (5' 8\").    Weight as of this encounter: 86.2 kg (190 lb).  Exam assisted by local RN  GENERAL APPEARANCE: alert and no distress  HEENT: MMM. PERRLA.   NECK: supple.   RESPIRATORY: lungs clear, respirations are unlabored, normal respiratory rate  CARDIOVASCULAR: regular rhythm, S1/S2, no murmur.   ABDOMEN: NT, ND.  EXTREMITIES: peripheral pulses normal, no edema  NEURO: A&O X4  SKIN: no ecchymoses, no rashes  PSYCH: normal affect, cooperative    Labs:  Reviewed.     Testing/Procedures:  4/2016 ECHOCARDIOGRAM ([facility] REPORT):     Final Impressions:   1. Normal left ventricular size, normal wall thickness, normal global systolic function, calculated EF of 68 %.   2. Right ventricular cavity size is normal, global systolic RV function is normal.   3. The mitral valve is normal, trace " mitral regurgitation.    Assessment and Plan:   Ms. Rojas is a 61 year old female who has a past medical history significant for HLD, and PAF (CHADSVASC 0). She was referred today for AF management. She states she has had palpitations or fluttering in her chest since high school. In , she had an episode of palpitations lasting longer then an hour for which she went into a local ER. By the time she had gotten to the ER her symptoms had abated and she was in sinus. No further intervention was done. Then in 2016, she had an episode of palpitations associated with presyncope for which she presented to her local ER again and was found to be in AF. She was started on diltiazem and was discharged home. She reported some possible side effects that she contributed to diltiazem and has taken it only as needed very sparingly. She continued to have further episodes lasting up to a few hours at a time. She was previously recommended to be on ASA, but was concerned about potential side effects. She was seen by Dr. Flores at [facility] in 2016 and plans were made for an ablation. Her symptoms improved so she deferred ablation at that time. Echo from 2016 showed normal structure and function. She more recently saw Dr. Parmar and reported recurrent palpitations/AF symptoms. She presents today for follow up. She reports feeling well.She has not had any significant AF symptoms since last visit. She denies any chest pain/pressures, dizziness, lightheadedness, worsening shortness of breath, leg/ankle swelling, PND, orthopnea, palpitations, or syncopal symptoms.  Current cardiac medication: diltiazem PRN.    Paroxsymal Atrial Fibrillation:   We discussed in detail with the patient management/treatment options for Isabella includin. Stroke Prophylaxis:  CHADSVASC=  0 (gender non-contributory if only risk factor), corresponding to a 0.2-0.5% annual stroke / systemic emolism event rate. Indicating NO need for long term oral  anticoagulation.   2. Rate Control: Continue diltiazem PRN.   3. Rhythm Control: She does have documented AF episode back in 2016. A Zio patch monitor confirmed 2% AF burden. We discussed use of AAT vs ablation. Discussed that she does not have any preclusions to AATs and we tend to favor Flecainide or Sotalol as first line agents. We also discussed role of ablation. She does continue to have some PAF episodes that are self limiting. She wants to continue with current conservative management.     4. Risk Factor Management: maintain good BP control, RD evaluation- she reports some snoring and had an incomplete RD test before and saw sleep medicine since last visit and was deemed not to have RD.      She will continue to follow with Dr. Parmar and can follow up with EP as needed.   Mele Villegas MD [facility]  Cardiology - Electrophysiology    AGUSTÍN CHRISTINE

## 2021-02-26 NOTE — NURSING NOTE
"Chief Complaint   Patient presents with     Telemedicine consult       Initial /80 (BP Location: Right arm, Patient Position: Sitting, Cuff Size: Adult Large)   Pulse 62   Resp 16   Wt 86.2 kg (190 lb)   SpO2 97%   BMI 28.89 kg/m   Estimated body mass index is 28.89 kg/m  as calculated from the following:    Height as of 1/17/20: 1.727 m (5' 8\").    Weight as of this encounter: 86.2 kg (190 lb).  Meds Reconciled: complete  Pt is not on Aspirin  Pt is not on a Statin  PHQ and/or MARIO reviewed. Pt referred to PCP/MH Provider as appropriate.    Delilah Nieto RN      "

## 2021-03-06 ENCOUNTER — HEALTH MAINTENANCE LETTER (OUTPATIENT)
Age: 61
End: 2021-03-06

## 2021-06-28 DIAGNOSIS — I48.0 PAF (PAROXYSMAL ATRIAL FIBRILLATION) (H): ICD-10-CM

## 2021-07-01 DIAGNOSIS — I48.0 PAF (PAROXYSMAL ATRIAL FIBRILLATION) (H): ICD-10-CM

## 2021-07-01 DIAGNOSIS — R00.2 PALPITATIONS: ICD-10-CM

## 2021-07-01 DIAGNOSIS — I49.3 VENTRICULAR ECTOPY: ICD-10-CM

## 2021-07-01 DIAGNOSIS — I49.1 PAC (PREMATURE ATRIAL CONTRACTION): Primary | ICD-10-CM

## 2021-07-01 RX ORDER — DILTIAZEM HCL 60 MG
60 TABLET ORAL 4 TIMES DAILY PRN
Qty: 30 TABLET | Refills: 1 | OUTPATIENT
Start: 2021-07-01

## 2021-07-01 RX ORDER — DILTIAZEM HCL 60 MG
60 TABLET ORAL 4 TIMES DAILY PRN
Qty: 60 TABLET | Refills: 1 | Status: SHIPPED | OUTPATIENT
Start: 2021-07-01 | End: 2024-07-29

## 2021-07-01 NOTE — TELEPHONE ENCOUNTER
Aric Parmar, DO  Cardiovascular Disease @  [facility] and [facility]

## 2021-08-09 ENCOUNTER — OFFICE VISIT (OUTPATIENT)
Dept: FAMILY MEDICINE | Facility: OTHER | Age: 61
End: 2021-08-09
Attending: FAMILY MEDICINE
Payer: COMMERCIAL

## 2021-08-09 ENCOUNTER — HOSPITAL ENCOUNTER (OUTPATIENT)
Dept: GENERAL RADIOLOGY | Facility: OTHER | Age: 61
End: 2021-08-09
Attending: FAMILY MEDICINE
Payer: COMMERCIAL

## 2021-08-09 VITALS
OXYGEN SATURATION: 96 % | SYSTOLIC BLOOD PRESSURE: 112 MMHG | RESPIRATION RATE: 16 BRPM | WEIGHT: 186.8 LBS | HEART RATE: 60 BPM | TEMPERATURE: 98.1 F | BODY MASS INDEX: 28.4 KG/M2 | DIASTOLIC BLOOD PRESSURE: 80 MMHG

## 2021-08-09 DIAGNOSIS — M54.50 ACUTE LOW BACK PAIN, UNSPECIFIED BACK PAIN LATERALITY, UNSPECIFIED WHETHER SCIATICA PRESENT: ICD-10-CM

## 2021-08-09 DIAGNOSIS — M54.50 ACUTE RIGHT-SIDED LOW BACK PAIN WITHOUT SCIATICA: Primary | ICD-10-CM

## 2021-08-09 DIAGNOSIS — S39.012A BACK STRAIN, INITIAL ENCOUNTER: ICD-10-CM

## 2021-08-09 PROCEDURE — 72100 X-RAY EXAM L-S SPINE 2/3 VWS: CPT

## 2021-08-09 PROCEDURE — 99214 OFFICE O/P EST MOD 30 MIN: CPT | Performed by: FAMILY MEDICINE

## 2021-08-09 RX ORDER — NAPROXEN 500 MG/1
500 TABLET ORAL 2 TIMES DAILY WITH MEALS
Qty: 40 TABLET | Refills: 0 | Status: SHIPPED | OUTPATIENT
Start: 2021-08-09 | End: 2022-05-20

## 2021-08-09 ASSESSMENT — PAIN SCALES - GENERAL: PAINLEVEL: EXTREME PAIN (8)

## 2021-08-09 NOTE — NURSING NOTE
Chief Complaint   Patient presents with     Pain     lower back      Here today with complaints of lower back pain for about 2 weeks. No known injury. Hx of strains and herniated discs. Has gone to the chiro a few times. Some relief. Started using warm and cold compresses today.     Medication Reconciliation: complete    Ramona William, LPN

## 2021-08-09 NOTE — PATIENT INSTRUCTIONS
Patient Education     Back Sprain or Strain     Injury to the muscles (strain) or ligaments (sprain) around the spine can be troubling. Injury may occur after a sudden forceful twisting or bending such as in a car accident, after a simple awkward movement, or after lifting something heavy with poor body positioning. In any case, muscle spasm is often present and adds to the pain.  Thankfully, most people feel better in 1 to 2 weeks. Most of the rest feel better in 1 to 2 months. Most people can remain active. Unless you had a forceful or traumatic physical injury such as a car accident or fall, X-rays may not be done for the first assessment of a back sprain or strain. If pain continues and doesn't respond to medical treatment, your healthcare provider may then do X-rays and other tests.  Home care  These guidelines will help you care for your injury at home:    When in bed, try to find a comfortable position. A firm mattress is best. Try lying flat on your back with pillows under your knees. You can also try lying on your side with your knees bent up toward your chest and a pillow between your knees.    Don't sit for long periods. Try not to take long car rides or take other trips that have you sitting for a long time. This puts more stress on the lower back than standing or walking.    During the first 24 to 72 hours after an injury or flare-up, put an ice pack on the painful area for 20 minutes. Then remove it for 20 minutes. Do this for 60 to 90 minutes, or several times a day. This will reduce swelling and pain. Always wrap the ice pack in a thin towel or plastic to protect your skin.    You can start with ice, then switch to heat. Heat from a hot shower, hot bath, or heating pad reduces pain and works well for muscle spasms. Put heat on the painful area for 20 minutes, then remove for 20 minutes. Do this for 60 to 90 minutes, or several times a day. Don't use a heating pad while sleeping. It can burn the  skin.    You can alternate the ice and heat. Talk with your healthcare provider to find out the best treatment or therapy for your back pain.    Therapeutic massage can help relax the back muscles without stretching them.    Be aware of safe lifting methods. Don't lift anything over 15 pounds until all of the pain is gone.  Medicines  Talk with your healthcare provider before using medicines, especially if you have other health problems or are taking other medicines.    You may use over-the-counter medicines such as acetaminophen, ibuprofen, or naproxen to control pain, unless another pain medicine was prescribed. Talk with your healthcare provider before taking any medicines if you have a chronic condition such as diabetes, liver or kidney disease, stomach ulcers, or digestive bleeding, or are taking blood-thinner medicines.    Be careful if you are given prescription medicines, opioids, or medicine for muscle spasm. They can cause drowsiness, and affect your coordination, reflexes, and judgment. Don't drive or operate heavy machinery when taking these types of medicines. Only take pain medicine as prescribed by your healthcare provider.  Follow-up care  Follow up with your healthcare provider, or as advised. You may need physical therapy or more tests if your symptoms get worse.  If you had X-rays, your healthcare provider may be checking for any broken bones, breaks, or fractures. Bruises and sprains can sometimes hurt as much as a fracture. These injuries can take time to heal fully. If your symptoms don t get better or they get worse, talk with your healthcare provider. You may need a repeat X-ray or other tests.  Call 911  Call 911 if any of the following occur:    Trouble breathing    Confused    Very drowsy or trouble awakening    Fainting or loss of consciousness    Rapid or very slow heart rate    Loss of bowel or bladder control  When to seek medical advice  Call your healthcare provider right away if any  of the following occur:    Pain gets worse or spreads to your arms or legs    Weakness or numbness in one or both arms or legs    Numbness in the groin or genital area  [employer] last reviewed this educational content on 11/1/2019 2000-2021 [employer]. All rights reserved. This information is not intended as a substitute for professional medical care. Always follow your healthcare professional's instructions.           Patient Education     Back Exercises: Hip Rotator Stretch    To start, lie on your back with your knees bent and feet flat on the floor. Don t press your neck or lower back to the floor. Breathe deeply. You should feel comfortable and relaxed in this position.    Rest your right ankle on your left knee.    Place a towel behind your left thigh, and use it to pull the knee toward your chest. Feel the stretch in your buttocks.    Hold for 30 to 60 seconds. Release.    Repeat 2 times.    Switch legs.     If there is any pain other than stretch in the knee or buttock, stop and contact your healthcare provider.  For your safety, check with your healthcare provider before starting an exercise program.    [employer] last reviewed this educational content on 3/1/2018    3712-4013 [employer]. All rights reserved. This information is not intended as a substitute for professional medical care. Always follow your healthcare professional's instructions.

## 2021-08-09 NOTE — PROGRESS NOTES
Nursing Notes:   Ramona William LPN  8/9/2021  3:33 PM  Sign at exiting of workspace  Chief Complaint   Patient presents with     Pain     lower back      Here today with complaints of lower back pain for about 2 weeks. No known injury. Hx of strains and herniated discs. Has gone to the chiro a few times. Some relief. Started using warm and cold compresses today.     Medication Reconciliation: complete    Ramona WRAY. JOSE William       SUBJECTIVE:  HPI: Felicitas Rojas is a 61 year old female here for acute right-sided low back pain for the past 2 weeks.  Pain is rated 8 out of 10 and is described as constant.  Pain is located at the top of her right gluteus rufina and wraps down towards her trochanteric bursa.  Patient reports worsening of pain with walking especially with hip extension.  She reports ice and heat reduce her pain when they are in place, but when she stops pain returns.  She does report that she has been lifting her 35 pound toddler over their baby gate and she wonders if she pulled a muscle.  She also feels like her hips may be out of alignment.  She reports having a history of leg length discrepancy and uses a diomedes.  She did see the chiropractor 10 days ago and had her upper back, neck and lower back adjusted.  Her upper back and neck did feel better but her lower back continues to be painful.  Patient has not tried ibuprofen or Tylenol.  Patient denies any shooting pain, leg numbness tingling weakness or bowel/bladder incontinence.    Past medical history significant for bulging disc 15 years ago.  Also history of arthritis per patient.  She received a steroid injection into her back which had been helpful.    Allergies significant for several medications that trigger her atrial fibrillation.    Allergies:  Allergies   Allergen Reactions     Aspartame Palpitations     Triggers A-Fib     Caffeine Palpitations     Triggers A-Fib     Monosodium Glutamate Palpitations     Triggers A-Fib     Cephalexin  "Itching     pt wrote \"redness irritation, inflamation , pus at point of incision\"  questionable allergy     Cyclobenzaprine      Other reaction(s): Dizziness  Lightheadedness. Patient unsure if this is true.      Sulfa Drugs      Other reaction(s): *Unknown - Childhood Rxn     Sulfacetamide Unknown     Vitamin B12 Unknown     Ambien [Zolpidem]      Caused A-fib.      Vitamin B1 & B12  [Trophite] Rash     Sublingual vitamin     ROS:  Constitutional, HEENT, cardiovascular, pulmonary, GI and  systems are negative, except as otherwise noted.    Past medical, surgical, and family history reviewed and updated as appropriate in the chart.  Relevant social history listed in HPI.    OBJECTIVE:  /80 (BP Location: Right arm, Patient Position: Sitting, Cuff Size: Adult Regular)   Pulse 60   Temp 98.1  F (36.7  C) (Tympanic)   Resp 16   Wt 84.7 kg (186 lb 12.8 oz)   SpO2 96%   Breastfeeding No   BMI 28.40 kg/m      EXAM:  Constitutional: No acute distress. Well-groomed, well-hydrated and well-nourished.  Appears stated age.  Head: Normocephalic, atraumatic.  Eyes: EOMI, anicteric  Respiratory: Non-labored respirations.  Skin: Warm, dry, intact.   Musculoskeletal:  Spine without bony deformities, edema or bruising.  Lumbar spine and right perivertebral area are slightly tender to palpation.  Patient has roughly a 8 cm circular area of tenderness that incorporates her gluteal muscle and paraspinal muscle.  Right hip with decreased range of motion including decreased flexion and extension.  Abduction and abduction are fairly well-preserved.  No pain with internal and external rotation.  Negative straight leg test.  Neurologic: A+Ox3. CN 2-12 grossly intact.  Slow gait due to pain.  No focal motor or sensory deficits. No tremor.  Psychiatric: Appropriate affect and insight.    Lumbar spine x-ray personally reviewed and interpreted  HISTORY: Acute low back pain, unspecified back pain laterality,  unspecified whether " sciatica present   TECHNIQUE: AP and lateral   FINDINGS: There is decrease in height in the L3-L4 L4-L5 and L5-S1  discs. There is mild lumbar scoliosis concave left. The sacrum and  sacroiliac joints appear normal. The paravertebral soft tissues are  normal                                                                 IMPRESSION: Degenerative changes in the lower lumbar spine   RIKA BANKS MD     ASSESSMENT/PLAN:   1. Acute right-sided low back pain without sciatica  2. Back strain, initial encounter  Comment: Exam, history and x-rays most consistent with an acute back strain, in addition to chronic osteoarthritis of lumbar spine.  Oral/IM steroids were considered; however, oral steroids even low doses can trigger A. fib, we will choose a different therapy given the patient's history of A. fib with certain medications such as caffeine, aspartame and monosodium glutamate.  Plan:  --Continue conservative treatment with ice and heat therapy at home, in addition to stretches -handout given to patient  --Encouraged her to have a lifting limit of 10 to 15 pounds as she heals  --Also discussed a short course of naproxen anti-inflammatory to help decrease inflammation.  Discussed she can take this with Tylenol as needed up to 4 g/day.  Discussed potential side effect of GI upset with naproxen and discussed taking it with meals.  - naproxen (NAPROSYN) 500 MG tablet; Take 1 tablet (500 mg) by mouth 2 times daily (with meals)  Dispense: 40 tablet; Refill: 0  -Return to clinic in 1 to 2 weeks for recheck and consider MRI if she has any persistent symptoms.    A total of 35 minutes were spent on this encounter, including prep time, visit time with the patient, and post visit work including documentation time.    Theresa Bird MD  [facility]

## 2022-05-20 ENCOUNTER — OFFICE VISIT (OUTPATIENT)
Dept: FAMILY MEDICINE | Facility: OTHER | Age: 62
End: 2022-05-20
Attending: STUDENT IN AN ORGANIZED HEALTH CARE EDUCATION/TRAINING PROGRAM
Payer: COMMERCIAL

## 2022-05-20 VITALS
TEMPERATURE: 97.2 F | WEIGHT: 194 LBS | BODY MASS INDEX: 29.5 KG/M2 | SYSTOLIC BLOOD PRESSURE: 118 MMHG | OXYGEN SATURATION: 98 % | HEART RATE: 74 BPM | DIASTOLIC BLOOD PRESSURE: 72 MMHG | RESPIRATION RATE: 16 BRPM

## 2022-05-20 DIAGNOSIS — S89.92XA KNEE INJURY, LEFT, INITIAL ENCOUNTER: Primary | ICD-10-CM

## 2022-05-20 PROCEDURE — 99214 OFFICE O/P EST MOD 30 MIN: CPT | Performed by: STUDENT IN AN ORGANIZED HEALTH CARE EDUCATION/TRAINING PROGRAM

## 2022-05-20 ASSESSMENT — PAIN SCALES - GENERAL: PAINLEVEL: MILD PAIN (2)

## 2022-05-20 NOTE — PROGRESS NOTES
Assessment & Plan     Knee injury, left, initial encounter  Wants PT to help learn proper mechanics of right leg while left is healing. Prefers any non surgical management so interested in referral to sports med. Continue supportive cares at home.   - Physical Therapy Referral; Future  - Orthopedic  Referral; Future    Tato Lopez MD  [facility]    Anabel Polk is a 62 year old who presents for the following health issues   Patient presents to clinic for discussion on left knee pain.  She has MRI scheduled on 05/31/22.      HPI     Left knee injury   - 5 days ago twisted and injured while playing basketball  - saw Dr. Green 4 days ago at [facility]  - wanting help with mechanics of right leg and strengthening left leg  - MRI scheduled for 5/31/2022  - wanting to know what can do in the meantime to help with mobility  - has a soft knee brace, and crutches to use   - overall pain feels better, ROM feels better, swelling better with icing.   - rarely needs ibuprofen   - she prefers any non surgical management if possible, wants to switch cares from [facility] back to [facility] so would like sports med referral           Review of Systems         Objective    /72 (BP Location: Right arm, Patient Position: Sitting, Cuff Size: Adult Regular)   Pulse 74   Temp 97.2  F (36.2  C) (Tympanic)   Resp 16   Wt 88 kg (194 lb)   LMP  (LMP Unknown)   SpO2 98%   Breastfeeding No   BMI 29.50 kg/m    Body mass index is 29.5 kg/m .  Physical Exam   GENERAL: healthy, alert and no distress  LEFT Knee: swollen compared to right. No tenderness to palpation along joint lines or patella. Slow active ROM

## 2022-05-20 NOTE — NURSING NOTE
Patient presents to clinic for discussion on left knee pain.  She has MRI scheduled on 05/31/22.    Medication Rec Complete  Iveth Yuan LPN............5/20/2022 10:12 AM

## 2022-05-31 ENCOUNTER — HOSPITAL ENCOUNTER (OUTPATIENT)
Dept: MRI IMAGING | Facility: OTHER | Age: 62
Discharge: HOME | End: 2022-05-31
Attending: FAMILY MEDICINE | Admitting: FAMILY MEDICINE
Payer: COMMERCIAL

## 2022-05-31 DIAGNOSIS — S89.92XA INJURY OF LEFT KNEE: ICD-10-CM

## 2022-05-31 PROCEDURE — 73721 MRI JNT OF LWR EXTRE W/O DYE: CPT | Mod: LT

## 2022-06-08 ENCOUNTER — OFFICE VISIT (OUTPATIENT)
Dept: FAMILY MEDICINE | Facility: OTHER | Age: 62
End: 2022-06-08
Attending: FAMILY MEDICINE
Payer: COMMERCIAL

## 2022-06-08 VITALS
OXYGEN SATURATION: 96 % | RESPIRATION RATE: 14 BRPM | DIASTOLIC BLOOD PRESSURE: 78 MMHG | TEMPERATURE: 97.6 F | HEART RATE: 77 BPM | SYSTOLIC BLOOD PRESSURE: 120 MMHG | HEIGHT: 67 IN | BODY MASS INDEX: 31.42 KG/M2 | WEIGHT: 200.2 LBS

## 2022-06-08 DIAGNOSIS — M23.301 DEGENERATIVE TEAR OF LATERAL MENISCUS OF LEFT KNEE: Primary | ICD-10-CM

## 2022-06-08 DIAGNOSIS — M17.12 PRIMARY OSTEOARTHRITIS OF LEFT KNEE: ICD-10-CM

## 2022-06-08 DIAGNOSIS — M17.12 PATELLOFEMORAL ARTHRITIS OF LEFT KNEE: ICD-10-CM

## 2022-06-08 PROCEDURE — 99215 OFFICE O/P EST HI 40 MIN: CPT | Performed by: FAMILY MEDICINE

## 2022-06-08 ASSESSMENT — PAIN SCALES - GENERAL: PAINLEVEL: MILD PAIN (3)

## 2022-06-08 NOTE — NURSING NOTE
"Chief Complaint   Patient presents with     Knee Injury     Left knee injury, DOI: 5/15/22     Patient presents for left knee injury. DOI: 5/15/22. Injured while playing basketball outside. [place] knee pop twice and had immediate pain. Pain 3/10 at rest, initial pain 9/10 after injury, activity pain 6/10. Patient denies past left knee surgery or injections.     Initial /78 (BP Location: Right arm, Patient Position: Sitting, Cuff Size: Adult Regular)   Pulse 77   Temp 97.6  F (36.4  C) (Tympanic)   Resp 14   Ht 1.702 m (5' 7\")   Wt 90.8 kg (200 lb 3.2 oz)   LMP  (LMP Unknown)   SpO2 96%   BMI 31.36 kg/m   Estimated body mass index is 31.36 kg/m  as calculated from the following:    Height as of this encounter: 1.702 m (5' 7\").    Weight as of this encounter: 90.8 kg (200 lb 3.2 oz).       Medication Reconciliation: Complete    Etta Harrell LPN .......  6/8/2022  8:14 AM   "

## 2022-06-08 NOTE — PROGRESS NOTES
"Sports Medicine Office Note    HPI:  62-year-old female coming in for evaluation of a left knee injury that occurred on 5/15 while playing basketball.  She suffered a twisting injury with an associated pop.  She was seen on 5/16 by Dr. Green in the [facility].  She was provided a knee brace and an MRI was ordered.  She followed up in primary care clinic on 5/20.  She was referred to physical therapy.  She had her MRI performed on 5/31.  She comes in today to follow-up on the results and discuss further options for management.    Her pain has persisted though slightly improved.  She rates her pain at 3/10.  She characterizes this pain as dull and achy though can be sharp and/or throbbing at times.  She has difficulty with bending, squatting, standing, and going up/down stairs.  She is no longer able to run and jump.  Initially she had tried icing and some OTC ibuprofen though does not like to take a lot of medications.  She has a history of arthroscopic meniscectomy on her right knee 11 years ago.      EXAM:  /78 (BP Location: Right arm, Patient Position: Sitting, Cuff Size: Adult Regular)   Pulse 77   Temp 97.6  F (36.4  C) (Tympanic)   Resp 14   Ht 1.702 m (5' 7\")   Wt 90.8 kg (200 lb 3.2 oz)   LMP  (LMP Unknown)   SpO2 96%   BMI 31.36 kg/m    MUSCULOSKELETAL EXAM:  LEFT KNEE  Inspection:  -No gross deformity  -No bruising or soft tissue swelling  -Scars:  None    Tenderness to palpation of the:  -Quadriceps musculature:  Negative  -Quadriceps tendon:  Negative  -Patella:  Negative  -Medial patellar facet: Positive  -Lateral patellar facet: Positive  -Inferior pole of the patella:  Negative  -Patellar tendon:  Negative  -Tibial tuberosity:  Negative  -Medial joint line:  Negative  -Medial collateral ligament:  Negative  -Medial hamstring tendons:  Negative  -Medial femoral condyle:  Negative  -Medial tibial plateau:  Negative  -Pes anserine bursa:  Negative  -Lateral joint line:  " Negative  -Distal IT band:  Negative  -Gerdy's tubercle:  Negative  -Lateral collateral ligament:  Negative  -Lateral hamstring tendons:  Negative  -Lateral femoral condyle:  Negative  -Lateral tibial plateau:  Negative  -Posterior lateral corner:  Negative  -Popliteal fossa:  Negative    Range of Motion:  -Passive flexion:  120  -Passive extension:  2-3    Strength:  -Extension:  5/5  -Flexion:  5/5    Special Tests:  -Effusion: Minimal  -Medial patellar glide:  Negative  -Lateral patellar glide:  Negative  -Patellar apprehension:  Negative  -Medial Chapis's:  Negative  -Lateral Chapis's: Positive  -Valgus stress:  Negative  -Varus stress:  Negative  -Lachman test:  Negative  -Anterior drawer:  Negative  -Posterior drawer:  Negative    Other:  -Intact sensation to light touch distally.  -No signs of cyanosis. Normal skin temperature of the lower extremity.  -Foot/ankle:  No gross deformity. Full range of motion.  -Right knee:  No gross deformity. No palpable tenderness. Normal strength and ROM.      IMAGIN2022: MRI left knee  - ACL, PCL, MCL, and LCL appear intact.  Peripheral tear of the lateral meniscus.  Medial meniscus appears intact.  No bony edema or fracture.  No significant joint effusion.  Extensor mechanism appears intact.  Tricompartmental degenerative changes.      ASSESSMENT/PLAN:  Diagnoses and all orders for this visit:  Degenerative tear of lateral meniscus of left knee  -     Orthopedic  Referral  Primary osteoarthritis of left knee  Patellofemoral arthritis of left knee    62-year-old female with a lateral meniscal tear and osteoarthritis.  MRI from  was personally reviewed in the office with the findings as demonstrated above by my interpretation.  We discussed that treatment options could either be surgical or nonsurgical.  At this time we will try to optimize nonsurgical treatment options.  We discussed these to include aerobic activity, physical therapy, topical  medications, oral medications, ice, heat, injections, and home exercises.  - OTC Voltaren 1% gel to be applied 3-4 times per day x7-10 days  - Continue to use the brace as needed  - Continue to move forward with establishing with PT  - Follow-up as needed  - If the above interventions fail to provide desired pain relief, consider oral prescription NSAID or CSI  - If the patient would like to pursue surgical options, she can call and we can place a referral (has previous relationship with orthopedic surgeon through [facility] who performed previous right knee surgery)      Quang Reid MD  6/8/2022  8:05 AM    Total time spent with this patient was 47 minutes which included chart review, visualization and independent interpretation of images, time spent with the patient, and documentation.    Procedure time:  0 minute(s)

## 2022-09-12 ENCOUNTER — OFFICE VISIT (OUTPATIENT)
Dept: FAMILY MEDICINE | Facility: OTHER | Age: 62
End: 2022-09-12
Attending: FAMILY MEDICINE
Payer: COMMERCIAL

## 2022-09-12 VITALS
OXYGEN SATURATION: 96 % | RESPIRATION RATE: 16 BRPM | WEIGHT: 195.6 LBS | TEMPERATURE: 96.9 F | SYSTOLIC BLOOD PRESSURE: 120 MMHG | BODY MASS INDEX: 30.64 KG/M2 | HEART RATE: 75 BPM | DIASTOLIC BLOOD PRESSURE: 82 MMHG

## 2022-09-12 DIAGNOSIS — R14.0 ABDOMINAL BLOATING: Primary | ICD-10-CM

## 2022-09-12 DIAGNOSIS — R25.2 LEG CRAMP: ICD-10-CM

## 2022-09-12 DIAGNOSIS — R19.5 POSITIVE COLORECTAL CANCER SCREENING USING COLOGUARD TEST: ICD-10-CM

## 2022-09-12 DIAGNOSIS — Z13.220 SCREENING FOR LIPID DISORDERS: ICD-10-CM

## 2022-09-12 LAB
ALBUMIN SERPL-MCNC: 4.1 G/DL (ref 3.5–5.7)
ALBUMIN UR-MCNC: NEGATIVE MG/DL
ALP SERPL-CCNC: 81 U/L (ref 34–104)
ALT SERPL W P-5'-P-CCNC: 14 U/L (ref 7–52)
ANION GAP SERPL CALCULATED.3IONS-SCNC: 5 MMOL/L (ref 3–14)
APPEARANCE UR: CLEAR
AST SERPL W P-5'-P-CCNC: 20 U/L (ref 13–39)
BASOPHILS # BLD AUTO: 0.1 10E3/UL (ref 0–0.2)
BASOPHILS NFR BLD AUTO: 2 %
BILIRUB SERPL-MCNC: 0.5 MG/DL (ref 0.3–1)
BILIRUB UR QL STRIP: NEGATIVE
BUN SERPL-MCNC: 18 MG/DL (ref 7–25)
CALCIUM SERPL-MCNC: 9.4 MG/DL (ref 8.6–10.3)
CHLORIDE BLD-SCNC: 106 MMOL/L (ref 98–107)
CHOLEST SERPL-MCNC: 227 MG/DL
CO2 SERPL-SCNC: 29 MMOL/L (ref 21–31)
COLOR UR AUTO: ABNORMAL
CREAT SERPL-MCNC: 0.85 MG/DL (ref 0.6–1.2)
EOSINOPHIL # BLD AUTO: 0.1 10E3/UL (ref 0–0.7)
EOSINOPHIL NFR BLD AUTO: 3 %
ERYTHROCYTE [DISTWIDTH] IN BLOOD BY AUTOMATED COUNT: 13.1 % (ref 10–15)
FASTING STATUS PATIENT QL REPORTED: ABNORMAL
GFR SERPL CREATININE-BSD FRML MDRD: 76 ML/MIN/1.73M2
GLUCOSE BLD-MCNC: 97 MG/DL (ref 70–105)
GLUCOSE UR STRIP-MCNC: NEGATIVE MG/DL
HCT VFR BLD AUTO: 38.5 % (ref 35–47)
HDLC SERPL-MCNC: 65 MG/DL (ref 23–92)
HGB BLD-MCNC: 12.8 G/DL (ref 11.7–15.7)
HGB UR QL STRIP: ABNORMAL
IMM GRANULOCYTES # BLD: 0 10E3/UL
IMM GRANULOCYTES NFR BLD: 0 %
KETONES UR STRIP-MCNC: NEGATIVE MG/DL
LDLC SERPL CALC-MCNC: 152 MG/DL
LEUKOCYTE ESTERASE UR QL STRIP: NEGATIVE
LYMPHOCYTES # BLD AUTO: 2.4 10E3/UL (ref 0.8–5.3)
LYMPHOCYTES NFR BLD AUTO: 49 %
MAGNESIUM SERPL-MCNC: 2.1 MG/DL (ref 1.9–2.7)
MCH RBC QN AUTO: 29.2 PG (ref 26.5–33)
MCHC RBC AUTO-ENTMCNC: 33.2 G/DL (ref 31.5–36.5)
MCV RBC AUTO: 88 FL (ref 78–100)
MONOCYTES # BLD AUTO: 0.4 10E3/UL (ref 0–1.3)
MONOCYTES NFR BLD AUTO: 9 %
MUCOUS THREADS #/AREA URNS LPF: PRESENT /LPF
NEUTROPHILS # BLD AUTO: 1.8 10E3/UL (ref 1.6–8.3)
NEUTROPHILS NFR BLD AUTO: 37 %
NITRATE UR QL: NEGATIVE
NONHDLC SERPL-MCNC: 162 MG/DL
NRBC # BLD AUTO: 0 10E3/UL
NRBC BLD AUTO-RTO: 0 /100
PH UR STRIP: 5 [PH] (ref 5–9)
PLATELET # BLD AUTO: 247 10E3/UL (ref 150–450)
POTASSIUM BLD-SCNC: 4.1 MMOL/L (ref 3.5–5.1)
PROT SERPL-MCNC: 7.2 G/DL (ref 6.4–8.9)
RBC # BLD AUTO: 4.38 10E6/UL (ref 3.8–5.2)
RBC URINE: <1 /HPF
SODIUM SERPL-SCNC: 140 MMOL/L (ref 134–144)
SP GR UR STRIP: 1.02 (ref 1–1.03)
TRIGL SERPL-MCNC: 50 MG/DL
TSH SERPL DL<=0.005 MIU/L-ACNC: 1 MU/L (ref 0.4–4)
UROBILINOGEN UR STRIP-MCNC: NORMAL MG/DL
WBC # BLD AUTO: 4.8 10E3/UL (ref 4–11)
WBC URINE: 1 /HPF

## 2022-09-12 PROCEDURE — 83735 ASSAY OF MAGNESIUM: CPT | Mod: ZL | Performed by: FAMILY MEDICINE

## 2022-09-12 PROCEDURE — 82040 ASSAY OF SERUM ALBUMIN: CPT | Mod: ZL | Performed by: FAMILY MEDICINE

## 2022-09-12 PROCEDURE — 80061 LIPID PANEL: CPT | Mod: ZL | Performed by: FAMILY MEDICINE

## 2022-09-12 PROCEDURE — 99214 OFFICE O/P EST MOD 30 MIN: CPT | Performed by: FAMILY MEDICINE

## 2022-09-12 PROCEDURE — 80053 COMPREHEN METABOLIC PANEL: CPT | Mod: ZL | Performed by: FAMILY MEDICINE

## 2022-09-12 PROCEDURE — 84443 ASSAY THYROID STIM HORMONE: CPT | Mod: ZL | Performed by: FAMILY MEDICINE

## 2022-09-12 PROCEDURE — 81001 URINALYSIS AUTO W/SCOPE: CPT | Mod: ZL | Performed by: FAMILY MEDICINE

## 2022-09-12 PROCEDURE — 36415 COLL VENOUS BLD VENIPUNCTURE: CPT | Mod: ZL | Performed by: FAMILY MEDICINE

## 2022-09-12 PROCEDURE — 85025 COMPLETE CBC W/AUTO DIFF WBC: CPT | Mod: ZL | Performed by: FAMILY MEDICINE

## 2022-09-12 ASSESSMENT — PAIN SCALES - GENERAL: PAINLEVEL: NO PAIN (0)

## 2022-09-12 NOTE — PROGRESS NOTES
Nursing Notes:   Ramona William LPN  9/12/2022  8:57 AM  Sign at exiting of workspace  Chief Complaint   Patient presents with     Bloated     Here today with concerns of abd pain and bloating that she had last week for 3 days. Due for Colonscopy.     Medication Reconciliation: complete    Ramona WRAYRoel JOSE William          Anabel Polk is a 62 year old, presenting for the following health issues:  Hilario Griffin is here today with a complaint of abdominal pain and bloating for 3 days last week.  She does feel better now.  She did have a positive cologuard on 2/6/2020, but has not had a colonoscopy to follow up on this to date.  3 days ago, she had a severe right thigh cramp as well, which was very unusual for her.  She started eating more greens to see if would help.  She does think it was possible that she may have been dehydrated too.  She has a bowel movement pretty much every day.  She had no hard stools or blood in her stools since then.  Earlier in the year, had early satiety after having covid.  She was feeling more bloated at that time as well, but that is better now.  Sleeping ok.  The pain was not waking her at night.  Was having a hard time passing gas.  No nausea/vomiting with it.  Denies fever.    History of Present Illness       Reason for visit:  I had bloating and abdomen discomfort last week  Symptoms include:  None now.  Symptom intensity:  Mild  Symptom progression:  Improving  Had these symptoms before:  No  What makes it better:  It lasted for about 3days    She eats 0-1 servings of fruits and vegetables daily.She consumes 0 sweetened beverage(s) daily.She exercises with enough effort to increase her heart rate 9 or less minutes per day.  She exercises with enough effort to increase her heart rate 3 or less days per week.   She is taking medications regularly.       abd pain and bloating       Review of Systems   Constitutional, HEENT, cardiovascular, pulmonary, GI, , musculoskeletal,  neuro, skin, endocrine and psych systems are negative, except as otherwise noted.      Objective    /82 (BP Location: Right arm, Patient Position: Sitting, Cuff Size: Adult Regular)   Pulse 75   Temp 96.9  F (36.1  C) (Tympanic)   Resp 16   Wt 88.7 kg (195 lb 9.6 oz)   LMP  (LMP Unknown)   SpO2 96%   Breastfeeding No   BMI 30.64 kg/m    Body mass index is 30.64 kg/m .  Physical Exam  Constitutional:       Appearance: Normal appearance.   HENT:      Head: Normocephalic.   Eyes:      Extraocular Movements: Extraocular movements intact.      Pupils: Pupils are equal, round, and reactive to light.   Cardiovascular:      Rate and Rhythm: Normal rate and regular rhythm.      Pulses: Normal pulses.      Heart sounds: Normal heart sounds. No murmur heard.  Pulmonary:      Effort: Pulmonary effort is normal.      Breath sounds: Normal breath sounds. No wheezing, rhonchi or rales.   Abdominal:      General: Abdomen is flat. Bowel sounds are normal. There is no distension.      Palpations: There is no mass.      Tenderness: There is abdominal tenderness (mild lower abdominal pain with palpation). There is no guarding or rebound.   Musculoskeletal:      Cervical back: Normal range of motion and neck supple.      Right lower leg: No edema.      Left lower leg: No edema.   Lymphadenopathy:      Cervical: No cervical adenopathy.   Neurological:      Mental Status: She is alert.   Psychiatric:         Mood and Affect: Mood normal.         Behavior: Behavior normal.                Assessment & Plan   Felicitas Rojas is a 62 year old, presenting for the following health issues:      ICD-10-CM    1. Abdominal bloating  R14.0 Adult GI  Referral - Procedure Only     CBC with Platelets & Differential     Comprehensive metabolic panel     TSH with free T4 reflex     UA reflex to Microscopic and Culture     CT Abdomen Pelvis w Contrast   2. Positive colorectal cancer screening using Cologuard test  R19.5 Adult GI   Referral - Procedure Only   3. Leg cramp  R25.2 Magnesium   4. Screening for lipid disorders  Z13.220 Lipid Profile     1.  She had a positive cologuard 2 years ago which she has not followed up on yet and strongly encouraged her to have a colonoscopy.  This is ordered.  Will also evaluate with labs as noted and CT.  Recommend going to the Emergency Department if getting acutely worse.  2.  See #1.  3.  Check labs as above as well as magnesium.  4.  Lipid profile ordered.      Mercedez Kaplan MD  [facility]

## 2022-09-12 NOTE — NURSING NOTE
Chief Complaint   Patient presents with     Bloated     Here today with concerns of abd pain and bloating that she had last week for 3 days. Due for Colonscopy.     Medication Reconciliation: complete    Ramona William LPN

## 2022-10-26 ENCOUNTER — HOSPITAL ENCOUNTER (OUTPATIENT)
Dept: CT IMAGING | Facility: OTHER | Age: 62
Discharge: HOME | End: 2022-10-26
Attending: FAMILY MEDICINE | Admitting: FAMILY MEDICINE
Payer: COMMERCIAL

## 2022-10-26 DIAGNOSIS — R14.0 ABDOMINAL BLOATING: ICD-10-CM

## 2022-10-26 PROCEDURE — 250N000011 HC RX IP 250 OP 636: Performed by: FAMILY MEDICINE

## 2022-10-26 PROCEDURE — 74177 CT ABD & PELVIS W/CONTRAST: CPT

## 2022-10-26 RX ORDER — IOPAMIDOL 755 MG/ML
100 INJECTION, SOLUTION INTRAVASCULAR ONCE
Status: COMPLETED | OUTPATIENT
Start: 2022-10-26 | End: 2022-10-26

## 2022-10-26 RX ADMIN — IOPAMIDOL 100 ML: 755 INJECTION, SOLUTION INTRAVENOUS at 08:23

## 2022-10-28 ENCOUNTER — TELEPHONE (OUTPATIENT)
Dept: FAMILY MEDICINE | Facility: OTHER | Age: 62
End: 2022-10-28

## 2022-10-28 NOTE — TELEPHONE ENCOUNTER
Patient states she is returning a call to a Unit [address] nurse.    Perla Alfred on 10/28/2022 at 4:54 PM

## 2022-11-25 ENCOUNTER — HOSPITAL ENCOUNTER (OUTPATIENT)
Facility: OTHER | Age: 62
End: 2022-11-25
Attending: SURGERY | Admitting: SURGERY
Payer: MEDICARE

## 2022-11-25 DIAGNOSIS — Z12.11 ENCOUNTER FOR SCREENING COLONOSCOPY: ICD-10-CM

## 2022-11-25 DIAGNOSIS — Z00.00 HEALTHCARE MAINTENANCE: Primary | ICD-10-CM

## 2022-11-25 NOTE — TELEPHONE ENCOUNTER
Screening Questions for the Scheduling of Screening Colonoscopies   (If Colonoscopy is diagnostic, Provider should review the chart before scheduling.)  Are you younger than 50 or older than 80?  NO  Do you take aspirin or fish oil?  NO (if yes, tell patient to stop 1 week prior to Colonoscopy)  Do you take warfarin (Coumadin), clopidogrel (Plavix), apixaban (Eliquis), dabigatram (Pradaxa), rivaroxaban (Xarelto) or any blood thinner? NO  Do you use oxygen at home?  NO  Do you have kidney disease? NO  Are you on dialysis? ON  Have you had a stroke or heart attack in the last year? [place]  Have you had a stent in your heart or any blood vessel in the last year? NO  Have you had a transplant of any organ? NO  Have you had a colonoscopy or upper endoscopy (EGD) before? NO         When?  NA  Date of scheduled Colonoscopy. 01/24/2023  [facility]  [facility]  HOME COVID TEST 01/22/2023

## 2022-11-30 RX ORDER — BISACODYL 5 MG/1
TABLET, DELAYED RELEASE ORAL
Qty: 2 TABLET | Refills: 0 | Status: SHIPPED | OUTPATIENT
Start: 2023-01-17 | End: 2024-08-22

## 2022-11-30 RX ORDER — POLYETHYLENE GLYCOL 3350, SODIUM CHLORIDE, SODIUM BICARBONATE, POTASSIUM CHLORIDE 420; 11.2; 5.72; 1.48 G/4L; G/4L; G/4L; G/4L
4000 POWDER, FOR SOLUTION ORAL ONCE
Qty: 4000 ML | Refills: 0 | Status: SHIPPED | OUTPATIENT
Start: 2023-01-17 | End: 2023-01-17

## 2023-01-18 ENCOUNTER — OFFICE VISIT (OUTPATIENT)
Dept: FAMILY MEDICINE | Facility: OTHER | Age: 63
End: 2023-01-18
Attending: STUDENT IN AN ORGANIZED HEALTH CARE EDUCATION/TRAINING PROGRAM
Payer: MEDICARE

## 2023-01-18 ENCOUNTER — HOSPITAL ENCOUNTER (OUTPATIENT)
Dept: GENERAL RADIOLOGY | Facility: OTHER | Age: 63
Discharge: HOME | End: 2023-01-18
Attending: STUDENT IN AN ORGANIZED HEALTH CARE EDUCATION/TRAINING PROGRAM
Payer: MEDICARE

## 2023-01-18 VITALS
OXYGEN SATURATION: 96 % | BODY MASS INDEX: 29.84 KG/M2 | SYSTOLIC BLOOD PRESSURE: 126 MMHG | TEMPERATURE: 98.1 F | RESPIRATION RATE: 18 BRPM | HEART RATE: 84 BPM | WEIGHT: 190.13 LBS | HEIGHT: 67 IN | DIASTOLIC BLOOD PRESSURE: 74 MMHG

## 2023-01-18 DIAGNOSIS — R05.1 ACUTE COUGH: ICD-10-CM

## 2023-01-18 DIAGNOSIS — R09.81 NASAL CONGESTION: ICD-10-CM

## 2023-01-18 DIAGNOSIS — R05.1 ACUTE COUGH: Primary | ICD-10-CM

## 2023-01-18 PROCEDURE — G0463 HOSPITAL OUTPT CLINIC VISIT: HCPCS | Mod: 25

## 2023-01-18 PROCEDURE — G0463 HOSPITAL OUTPT CLINIC VISIT: HCPCS

## 2023-01-18 PROCEDURE — 99213 OFFICE O/P EST LOW 20 MIN: CPT | Performed by: STUDENT IN AN ORGANIZED HEALTH CARE EDUCATION/TRAINING PROGRAM

## 2023-01-18 PROCEDURE — 71046 X-RAY EXAM CHEST 2 VIEWS: CPT

## 2023-01-18 RX ORDER — FLUTICASONE PROPIONATE 50 MCG
1 SPRAY, SUSPENSION (ML) NASAL DAILY
Qty: 11 ML | Refills: 1 | Status: SHIPPED | OUTPATIENT
Start: 2023-01-18 | End: 2024-08-22

## 2023-01-18 RX ORDER — BENZONATATE 100 MG/1
100 CAPSULE ORAL 3 TIMES DAILY PRN
Qty: 30 CAPSULE | Refills: 1 | Status: SHIPPED | OUTPATIENT
Start: 2023-01-18 | End: 2024-07-29

## 2023-01-18 ASSESSMENT — ENCOUNTER SYMPTOMS: COUGH: 1

## 2023-01-18 ASSESSMENT — PAIN SCALES - GENERAL: PAINLEVEL: NO PAIN (0)

## 2023-01-18 NOTE — NURSING NOTE
Patient presents to clinic experiencing cough, chest congestion, sore throat, sinus drainage and nausea x 2 weeks.    Medication Rec Complete  Iveth Yuan LPN............1/18/2023 9:20 AM

## 2023-01-18 NOTE — PROGRESS NOTES
"  Assessment & Plan     Acute cough  Likely viral URI. No evidence of pneumonia on cxr so no indication for antibiotics. Trial tessalon for cough. Supportive cares at home with humidified air, cough drops, honey, etc. Given return parameters  - XR Chest 2 Views; Future  - benzonatate (TESSALON) 100 MG capsule; Take 1 capsule (100 mg) by mouth 3 times daily as needed for cough    Nasal congestion  Likely having some post nasal drip causing component of the cough   - fluticasone (FLONASE) 50 MCG/ACT nasal spray; Spray 1 spray into both nostrils daily             BMI:   Estimated body mass index is 29.78 kg/m  as calculated from the following:    Height as of this encounter: 1.702 m (5' 7\").    Weight as of this encounter: 86.2 kg (190 lb 2 oz).           No follow-ups on file.    Tato Lopez MD  [facility]    [facility]   Felicitas is a 62 year old, presenting for the following health issues:  Cough (Chest congestion, sinus drainage, nausea x 2 weeks)      Cough       Cough   - started 2 weeks ago   - was not getting better, now getting worse  - productive cough  - no over the counter meds  - no fevers   - occasional nausea, sore ribs from coughing  - sensation of drowning/short of breath when coughing   - nose in the last 24 hours has become congested   - was around her grandkids but no known exposures           Review of Systems   Respiratory: Positive for cough.       Constitutional, HEENT, cardiovascular, pulmonary, gi and gu systems are negative, except as otherwise noted.      Objective    /74 (BP Location: Right arm, Patient Position: Sitting, Cuff Size: Adult Large)   Pulse 84   Temp 98.1  F (36.7  C) (Tympanic)   Resp 18   Ht 1.702 m (5' 7\")   Wt 86.2 kg (190 lb 2 oz)   LMP  (LMP Unknown)   SpO2 96%   BMI 29.78 kg/m    Body mass index is 29.78 kg/m .  Physical Exam   GENERAL: healthy, alert and no distress  EYES: Eyes grossly normal to inspection, PERRL and " conjunctivae and sclerae normal  HENT: ear canals and TM's normal, nose and mouth without ulcers or lesions  RESP: lungs clear to auscultation - no rales, rhonchi or wheezes  CV: regular rates and rhythm and no murmur, click or rub  PSYCH: mentation appears normal, affect normal/bright    XR Chest 2 Views    Result Date: 1/18/2023  PROCEDURE:  XR CHEST 2 VIEWS HISTORY: 2 weeks of cough, now worsening shortness of breath; Acute cough COMPARISON:  Radiographs 5/13/2016 FINDINGS: PA and lateral chest radiographs Cardiomediastinal silhouette is within normal limits. No focal consolidation, effusion or pneumothorax.  Streaky left mid lower lobe opacities, likely atelectasis No suspicious osseous lesion or subdiaphragmatic free air.     IMPRESSION:  No acute cardiopulmonary process.  ANTHONY MOTA MD   SYSTEM ID:  [id number]

## 2023-01-18 NOTE — RESULT ENCOUNTER NOTE
Results discussed directly with patient while patient was present. Any further details documented in the note.   Tato Lopez MD

## 2023-11-09 DIAGNOSIS — Z12.11 COLON CANCER SCREENING: ICD-10-CM

## 2023-11-23 ENCOUNTER — LAB (OUTPATIENT)
Dept: FAMILY MEDICINE | Facility: CLINIC | Age: 63
End: 2023-11-23
Payer: COMMERCIAL

## 2023-11-23 DIAGNOSIS — Z12.11 COLON CANCER SCREENING: ICD-10-CM

## 2024-06-13 DIAGNOSIS — Z12.11 ENCOUNTER FOR SCREENING COLONOSCOPY: Primary | ICD-10-CM

## 2024-06-13 NOTE — TELEPHONE ENCOUNTER
Screening Questions for the Scheduling of Screening Colonoscopies   (If Colonoscopy is diagnostic, Provider should review the chart before scheduling.)  Are you younger than 45 or older than 80?  NO  Do you take aspirin or fish oil?  NO (if yes, tell patient to stop 1 week prior to Colonoscopy)  Do you take warfarin (Coumadin), clopidogrel (Plavix), apixaban (Eliquis), dabigatram (Pradaxa), rivaroxaban (Xarelto) or any blood thinner? NO  Do you take Ozempic? NO  Do you use oxygen or a CPAP at home?  NO  Do you have kidney disease? NO  Are you on dialysis? NO  Have you had a stroke or heart attack in the last year? NO  Have you had a stent in your heart or any blood vessel in the last year? NO  Have you had a transplant of any organ? NO  Have you had a colonoscopy or upper endoscopy (EGD) before? NO  Date of scheduled Colonoscopy. 08/27/2024  Provider [facility]  [facility]

## 2024-06-17 RX ORDER — BISACODYL 5 MG/1
TABLET, DELAYED RELEASE ORAL
Qty: 2 TABLET | Refills: 0 | Status: SHIPPED | OUTPATIENT
Start: 2024-08-20 | End: 2024-08-22

## 2024-06-17 RX ORDER — POLYETHYLENE GLYCOL 3350, SODIUM CHLORIDE, SODIUM BICARBONATE, POTASSIUM CHLORIDE 420; 11.2; 5.72; 1.48 G/4L; G/4L; G/4L; G/4L
4000 POWDER, FOR SOLUTION ORAL ONCE
Qty: 4000 ML | Refills: 0 | Status: SHIPPED | OUTPATIENT
Start: 2024-08-20 | End: 2024-08-20

## 2024-07-29 ENCOUNTER — HOSPITAL ENCOUNTER (OUTPATIENT)
Dept: GENERAL RADIOLOGY | Facility: OTHER | Age: 64
Discharge: HOME | End: 2024-07-29
Attending: STUDENT IN AN ORGANIZED HEALTH CARE EDUCATION/TRAINING PROGRAM
Payer: MEDICARE

## 2024-07-29 ENCOUNTER — OFFICE VISIT (OUTPATIENT)
Dept: FAMILY MEDICINE | Facility: OTHER | Age: 64
End: 2024-07-29
Attending: STUDENT IN AN ORGANIZED HEALTH CARE EDUCATION/TRAINING PROGRAM
Payer: COMMERCIAL

## 2024-07-29 VITALS
HEART RATE: 60 BPM | RESPIRATION RATE: 18 BRPM | TEMPERATURE: 96.8 F | DIASTOLIC BLOOD PRESSURE: 82 MMHG | HEIGHT: 67 IN | OXYGEN SATURATION: 99 % | SYSTOLIC BLOOD PRESSURE: 124 MMHG | BODY MASS INDEX: 28.85 KG/M2 | WEIGHT: 183.8 LBS

## 2024-07-29 DIAGNOSIS — E28.319 ASYMPTOMATIC PREMATURE MENOPAUSE: ICD-10-CM

## 2024-07-29 DIAGNOSIS — I49.1 PAC (PREMATURE ATRIAL CONTRACTION): ICD-10-CM

## 2024-07-29 DIAGNOSIS — M85.80 OSTEOPENIA, UNSPECIFIED LOCATION: ICD-10-CM

## 2024-07-29 DIAGNOSIS — M79.675 PAIN AND SWELLING OF TOE OF LEFT FOOT: ICD-10-CM

## 2024-07-29 DIAGNOSIS — Z12.31 ENCOUNTER FOR SCREENING MAMMOGRAM FOR MALIGNANT NEOPLASM OF BREAST: ICD-10-CM

## 2024-07-29 DIAGNOSIS — R00.2 PALPITATIONS: ICD-10-CM

## 2024-07-29 DIAGNOSIS — D22.9 SKIN MOLE: ICD-10-CM

## 2024-07-29 DIAGNOSIS — I49.3 VENTRICULAR ECTOPY: ICD-10-CM

## 2024-07-29 DIAGNOSIS — M79.89 PAIN AND SWELLING OF TOE OF LEFT FOOT: ICD-10-CM

## 2024-07-29 DIAGNOSIS — I48.0 PAF (PAROXYSMAL ATRIAL FIBRILLATION) (H): ICD-10-CM

## 2024-07-29 DIAGNOSIS — Z00.00 ENCOUNTER FOR MEDICARE ANNUAL WELLNESS EXAM: Primary | ICD-10-CM

## 2024-07-29 LAB
ANION GAP SERPL CALCULATED.3IONS-SCNC: 9 MMOL/L (ref 7–15)
BASOPHILS # BLD AUTO: 0.1 10E3/UL (ref 0–0.2)
BASOPHILS NFR BLD AUTO: 1 %
BUN SERPL-MCNC: 20.8 MG/DL (ref 8–23)
CALCIUM SERPL-MCNC: 9.6 MG/DL (ref 8.8–10.4)
CHLORIDE SERPL-SCNC: 105 MMOL/L (ref 98–107)
CHOLEST SERPL-MCNC: 235 MG/DL
CREAT SERPL-MCNC: 0.8 MG/DL (ref 0.51–0.95)
EGFRCR SERPLBLD CKD-EPI 2021: 81 ML/MIN/1.73M2
EOSINOPHIL # BLD AUTO: 0.1 10E3/UL (ref 0–0.7)
EOSINOPHIL NFR BLD AUTO: 1 %
ERYTHROCYTE [DISTWIDTH] IN BLOOD BY AUTOMATED COUNT: 13.3 % (ref 10–15)
FASTING STATUS PATIENT QL REPORTED: ABNORMAL
FASTING STATUS PATIENT QL REPORTED: NORMAL
GLUCOSE SERPL-MCNC: 98 MG/DL (ref 70–99)
HCO3 SERPL-SCNC: 27 MMOL/L (ref 22–29)
HCT VFR BLD AUTO: 41.4 % (ref 35–47)
HDLC SERPL-MCNC: 72 MG/DL
HGB BLD-MCNC: 13.4 G/DL (ref 11.7–15.7)
IMM GRANULOCYTES # BLD: 0 10E3/UL
IMM GRANULOCYTES NFR BLD: 0 %
LDLC SERPL CALC-MCNC: 144 MG/DL
LYMPHOCYTES # BLD AUTO: 2.5 10E3/UL (ref 0.8–5.3)
LYMPHOCYTES NFR BLD AUTO: 47 %
MCH RBC QN AUTO: 29.2 PG (ref 26.5–33)
MCHC RBC AUTO-ENTMCNC: 32.4 G/DL (ref 31.5–36.5)
MCV RBC AUTO: 90 FL (ref 78–100)
MONOCYTES # BLD AUTO: 0.3 10E3/UL (ref 0–1.3)
MONOCYTES NFR BLD AUTO: 6 %
NEUTROPHILS # BLD AUTO: 2.5 10E3/UL (ref 1.6–8.3)
NEUTROPHILS NFR BLD AUTO: 45 %
NONHDLC SERPL-MCNC: 163 MG/DL
NRBC # BLD AUTO: 0 10E3/UL
NRBC BLD AUTO-RTO: 0 /100
PLATELET # BLD AUTO: 216 10E3/UL (ref 150–450)
POTASSIUM SERPL-SCNC: 3.7 MMOL/L (ref 3.4–5.3)
RBC # BLD AUTO: 4.59 10E6/UL (ref 3.8–5.2)
SODIUM SERPL-SCNC: 141 MMOL/L (ref 135–145)
TRIGL SERPL-MCNC: 93 MG/DL
WBC # BLD AUTO: 5.5 10E3/UL (ref 4–11)

## 2024-07-29 PROCEDURE — G0439 PPPS, SUBSEQ VISIT: HCPCS | Performed by: STUDENT IN AN ORGANIZED HEALTH CARE EDUCATION/TRAINING PROGRAM

## 2024-07-29 PROCEDURE — 36415 COLL VENOUS BLD VENIPUNCTURE: CPT | Mod: ZL | Performed by: STUDENT IN AN ORGANIZED HEALTH CARE EDUCATION/TRAINING PROGRAM

## 2024-07-29 PROCEDURE — 99213 OFFICE O/P EST LOW 20 MIN: CPT | Mod: 25 | Performed by: STUDENT IN AN ORGANIZED HEALTH CARE EDUCATION/TRAINING PROGRAM

## 2024-07-29 PROCEDURE — 82374 ASSAY BLOOD CARBON DIOXIDE: CPT | Mod: ZL | Performed by: STUDENT IN AN ORGANIZED HEALTH CARE EDUCATION/TRAINING PROGRAM

## 2024-07-29 PROCEDURE — 85025 COMPLETE CBC W/AUTO DIFF WBC: CPT | Mod: ZL | Performed by: STUDENT IN AN ORGANIZED HEALTH CARE EDUCATION/TRAINING PROGRAM

## 2024-07-29 PROCEDURE — 80061 LIPID PANEL: CPT | Mod: ZL | Performed by: STUDENT IN AN ORGANIZED HEALTH CARE EDUCATION/TRAINING PROGRAM

## 2024-07-29 PROCEDURE — G0463 HOSPITAL OUTPT CLINIC VISIT: HCPCS | Mod: 25

## 2024-07-29 PROCEDURE — 73660 X-RAY EXAM OF TOE(S): CPT | Mod: LT

## 2024-07-29 RX ORDER — DILTIAZEM HCL 60 MG
60 TABLET ORAL 4 TIMES DAILY PRN
Qty: 60 TABLET | Refills: 1 | Status: SHIPPED | OUTPATIENT
Start: 2024-07-29

## 2024-07-29 SDOH — HEALTH STABILITY: PHYSICAL HEALTH: ON AVERAGE, HOW MANY DAYS PER WEEK DO YOU ENGAGE IN MODERATE TO STRENUOUS EXERCISE (LIKE A BRISK WALK)?: 0 DAYS

## 2024-07-29 ASSESSMENT — SOCIAL DETERMINANTS OF HEALTH (SDOH): HOW OFTEN DO YOU GET TOGETHER WITH FRIENDS OR RELATIVES?: PATIENT DECLINED

## 2024-07-29 ASSESSMENT — PAIN SCALES - GENERAL: PAINLEVEL: NO PAIN (0)

## 2024-07-29 NOTE — PATIENT INSTRUCTIONS
Patient Education   Preventive Care Advice   This is general advice given by our system to help you stay healthy. However, your care team may have specific advice just for you. Please talk to your care team about your preventive care needs.  Nutrition  Eat 5 or more servings of fruits and vegetables each day.  Try wheat bread, brown rice and whole grain pasta (instead of white bread, rice, and pasta).  Get enough calcium and vitamin D. Check the label on foods and aim for 100% of the RDA (recommended daily allowance).  Lifestyle  Exercise at least 150 minutes each week  (30 minutes a day, 5 days a week).  Do muscle strengthening activities 2 days a week. These help control your weight and prevent disease.  No smoking.  Wear sunscreen to prevent skin cancer.  Have a dental exam and cleaning every 6 months.  Yearly exams  See your health care team every year to talk about:  Any changes in your health.  Any medicines your care team has prescribed.  Preventive care, family planning, and ways to prevent chronic diseases.  Shots (vaccines)   HPV shots (up to age 26), if you've never had them before.  Hepatitis B shots (up to age 59), if you've never had them before.  COVID-19 shot: Get this shot when it's due.  Flu shot: Get a flu shot every year.  Tetanus shot: Get a tetanus shot every 10 years.  Pneumococcal, hepatitis A, and RSV shots: Ask your care team if you need these based on your risk.  Shingles shot (for age 50 and up)  General health tests  Diabetes screening:  Starting at age 35, Get screened for diabetes at least every 3 years.  If you are younger than age 35, ask your care team if you should be screened for diabetes.  Cholesterol test: At age 39, start having a cholesterol test every 5 years, or more often if advised.  Bone density scan (DEXA): At age 50, ask your care team if you should have this scan for osteoporosis (brittle bones).  Hepatitis C: Get tested at least once in your life.  STIs (sexually  transmitted infections)  Before age 24: Ask your care team if you should be screened for STIs.  After age 24: Get screened for STIs if you're at risk. You are at risk for STIs (including HIV) if:  You are sexually active with more than one person.  You don't use condoms every time.  You or a partner was diagnosed with a sexually transmitted infection.  If you are at risk for HIV, ask about PrEP medicine to prevent HIV.  Get tested for HIV at least once in your life, whether you are at risk for HIV or not.  Cancer screening tests  Cervical cancer screening: If you have a cervix, begin getting regular cervical cancer screening tests starting at age 21.  Breast cancer scan (mammogram): If you've ever had breasts, begin having regular mammograms starting at age 40. This is a scan to check for breast cancer.  Colon cancer screening: It is important to start screening for colon cancer at age 45.  Have a colonoscopy test every 10 years (or more often if you're at risk) Or, ask your provider about stool tests like a FIT test every year or Cologuard test every 3 years.  To learn more about your testing options, visit:   .  For help making a decision, visit:   https://bit.ly/lt20011.  Prostate cancer screening test: If you have a prostate, ask your care team if a prostate cancer screening test (PSA) at age 55 is right for you.  Lung cancer screening: If you are a current or former smoker ages 50 to 80, ask your care team if ongoing lung cancer screenings are right for you.  For informational purposes only. Not to replace the advice of your health care provider. Copyright   2023 [employer]. All rights reserved. Clinically reviewed by the [facility]. [employer] 359499 - REV 01/24.

## 2024-07-29 NOTE — PROGRESS NOTES
"Preventive Care Visit  [facility]  Tato Lopez MD, Family Medicine  Jul 29, 2024      Assessment & Plan     Encounter for Medicare annual wellness exam  - MA Screen Bilateral w/Isac; Future  - CBC and Differential; Future  - Basic Metabolic Panel; Future  - Lipid Panel; Future  - DX Bone Density; Future  - CBC and Differential  - Basic Metabolic Panel  - Lipid Panel    Skin mole  Referral to re-establish care   - Adult Dermatology  Referral; Future    PAF (paroxysmal atrial fibrillation) (H)  PAC (premature atrial contraction)  Palpitations  Ventricular ectopy  Rare use  - diltiazem (CARDIZEM) 60 MG tablet; Take 1 tablet (60 mg) by mouth 4 times daily as needed (palpitations)    Encounter for screening mammogram for malignant neoplasm of breast  - MA Screen Bilateral w/Isac; Future    Asymptomatic premature menopause  - DX Bone Density; Future    Pain and swelling of toe of left foot  Likely arthritis. No history of gout   - XR Toe Left G/E 2 Views; Future            BMI  Estimated body mass index is 29 kg/m  as calculated from the following:    Height as of this encounter: 1.695 m (5' 6.75\").    Weight as of this encounter: 83.4 kg (183 lb 12.8 oz).       Counseling  Appropriate preventive services were addressed with this patient via screening, questionnaire, or discussion as appropriate for fall prevention, nutrition, physical activity, Tobacco-use cessation, weight loss and cognition.  Checklist reviewing preventive services available has been given to the patient.  Reviewed patient's diet, addressing concerns and/or questions.           No follow-ups on file.    Anabel Polk is a 64 year old, presenting for the following:  Wellness Visit (Annual Wellness)        7/29/2024    12:35 PM   Additional Questions   Roomed by Jovanna URBAN LPN   Accompanied by self         Health Care Directive  Patient does not have a Health Care Directive or Living Will: Discussed advance " care planning with patient; however, patient declined at this time.    HPI    Swollen toe, for a few years  Left ear feels plugged, hears a noise, then relieves pressure for a bit  Moles--multiple scattered moles. Was seeing derm at [facility], would like to get back in with them               7/29/2024   General Health   How would you rate your overall physical health? Good   Feel stress (tense, anxious, or unable to sleep) Not at all            7/29/2024   Nutrition   Diet: Other   If other, please elaborate: I eliminate foods and additives that trigger a-fib            7/29/2024   Exercise   Days per week of moderate/strenous exercise 0 days      (!) EXERCISE CONCERN      7/29/2024   Social Factors   Frequency of gathering with friends or relatives Patient declined   Worry food won't last until get money to buy more No   Food not last or not have enough money for food? No   Do you have housing? (Housing is defined as stable permanent housing and does not include staying ouside in a car, in a tent, in an abandoned building, in an overnight shelter, or couch-surfing.) Yes   Are you worried about losing your housing? No   Lack of transportation? No   Unable to get utilities (heat,electricity)? No            7/29/2024   Fall Risk   Fallen 2 or more times in the past year? No    No   Trouble with walking or balance? No    No       Multiple values from one day are sorted in reverse-chronological order          7/29/2024   Activities of Daily Living- Home Safety   Needs help with the following daily activites None of the above   Safety concerns in the home None of the above            7/29/2024   Dental   Dentist two times every year? Yes            7/29/2024   Hearing Screening   Hearing concerns? None of the above            7/29/2024   Driving Risk Screening   Patient/family members have concerns about driving No            7/29/2024   General Alertness/Fatigue Screening   Have you been more tired than usual lately? No             7/29/2024   Urinary Incontinence Screening   Bothered by leaking urine in past 6 months No            7/29/2024   TB Screening   Were you born outside of the [place]? No            Today's PHQ-2 Score:       7/29/2024    12:32 PM   PHQ-2 ( 1999 [employer])   Q1: Little interest or pleasure in doing things 0   Q2: Feeling down, depressed or hopeless 0   PHQ-2 Score 0   Q1: Little interest or pleasure in doing things Not at all   Q2: Feeling down, depressed or hopeless Not at all   PHQ-2 Score 0           7/29/2024   Substance Use   Alcohol more than 3/day or more than 7/wk Not Applicable   Do you have a current opioid prescription? No   How severe/bad is pain from 1 to 10? 0/10 (No Pain)   Do you use any other substances recreationally? No        Social History     Tobacco Use    Smoking status: Never     Passive exposure: Never    Smokeless tobacco: Never   Vaping Use    Vaping status: Never Used   Substance Use Topics    Alcohol use: No     Alcohol/week: 0.0 standard drinks of alcohol    Drug use: No          Mammogram Screening - Mammogram every 1-2 years updated in Health Maintenance based on mutual decision making      History of abnormal Pap smear: No - age 65 or older with adequate negative prior screening test results (3 consecutive negative cytology results, 2 consecutive negative cotesting results, or 2 consecutive negative HrHPV test results within 10 years, with the most recent test occurring within the recommended screening interval for the test used)        Latest Ref Rng & Units 1/29/2020     9:01 AM 1/17/2020     1:29 PM   PAP / HPV   PAP (Historical)   OTHER-NIL, See Result    HPV 16 DNA NEG^Negative Negative     HPV 18 DNA NEG^Negative Negative     Other HR HPV NEG^Negative Negative       ASCVD Risk   The 10-year ASCVD risk score (Aman ESPINOSA, et al., 2019) is: 5.8%    Values used to calculate the score:      Age: 64 years      Sex: Female      Is Non- : No       "Diabetic: No      Tobacco smoker: No      Systolic Blood Pressure: 124 mmHg      Is BP treated: No      HDL Cholesterol: 65 mg/dL      Total Cholesterol: 227 mg/dL            Reviewed and updated as needed this visit by Provider                    Lab work is in process  Current providers sharing in care for this patient include:  Patient Care Team:  Tato Galloway MD as PCP - General (Family Medicine)  Tato Galloway MD as Assigned PCP    The following health maintenance items are reviewed in Epic and correct as of today:  Health Maintenance   Topic Date Due    DEXA  Never done    ADVANCE CARE PLANNING  Never done    HEPATITIS C SCREENING  Never done    ZOSTER IMMUNIZATION (1 of 2) Never done    RSV VACCINE (Pregnancy & 60+) (1 - 1-dose 60+ series) Never done    MAMMO SCREENING  01/24/2022    MEDICARE ANNUAL WELLNESS VISIT  01/31/2023    Pneumococcal Vaccine: 65+ Years (1 of 1 - PCV) Never done    COLORECTAL CANCER SCREENING  02/06/2023    COVID-19 Vaccine (1 - 2023-24 season) Never done    LIPID  09/12/2023    DTAP/TDAP/TD IMMUNIZATION (2 - Td or Tdap) 07/17/2024    INFLUENZA VACCINE (1) 09/01/2024    FALL RISK ASSESSMENT  07/29/2025    GLUCOSE  09/12/2025    PHQ-2 (once per calendar year)  Completed    IPV IMMUNIZATION  Aged Out    HPV IMMUNIZATION  Aged Out    MENINGITIS IMMUNIZATION  Aged Out    RSV MONOCLONAL ANTIBODY  Aged Out    PAP  Discontinued         Review of Systems  Constitutional, HEENT, cardiovascular, pulmonary, gi and gu systems are negative, except as otherwise noted.     Objective    Exam  /82 (BP Location: Right arm, Patient Position: Chair, Cuff Size: Adult Regular)   Pulse 60   Temp 96.8  F (36  C) (Temporal)   Resp 18   Ht 1.695 m (5' 6.75\")   Wt 83.4 kg (183 lb 12.8 oz)   LMP  (LMP Unknown)   SpO2 99%   BMI 29.00 kg/m     Estimated body mass index is 29 kg/m  as calculated from the following:    Height as of this encounter: 1.695 m (5' 6.75\").    Weight as of " this encounter: 83.4 kg (183 lb 12.8 oz).    Physical Exam  GENERAL: alert and no distress  RESP: lungs clear to auscultation - no rales, rhonchi or wheezes  CV: regular rate and rhythm, normal S1 S2, no S3 or S4, no murmur, click or rub, no peripheral edema   MS: left middle toe enlarged hardened non tender joint   SKIN: no suspicious lesions or rashes  PSYCH: mentation appears normal, affect normal/bright        7/29/2024   Mini Cog   Clock Draw Score 2 Normal   3 Item Recall 3 objects recalled   Mini Cog Total Score 5                 Signed Electronically by: Tato Lopez MD

## 2024-07-29 NOTE — RESULT ENCOUNTER NOTE
Please call as she is unable to access [employer]: Here are my comments about the recent results. Cholesterol remains elevated--increase fiber in diet, decrease animal fat intake, increase exercise. All other labs looks normal. Xray looks like arthritis.   I forgot to look in your ears-- can try OTC flonase or a decongestant to see if that helps

## 2024-07-29 NOTE — NURSING NOTE
"Chief Complaint   Patient presents with    Wellness Visit     Annual Wellness       Initial /82 (BP Location: Right arm, Patient Position: Chair, Cuff Size: Adult Regular)   Pulse 60   Temp 96.8  F (36  C) (Temporal)   Resp 18   Ht 1.695 m (5' 6.75\")   Wt 83.4 kg (183 lb 12.8 oz)   LMP  (LMP Unknown)   SpO2 99%   BMI 29.00 kg/m   Estimated body mass index is 29 kg/m  as calculated from the following:    Height as of this encounter: 1.695 m (5' 6.75\").    Weight as of this encounter: 83.4 kg (183 lb 12.8 oz).      Medication Reconciliation: Complete.       Jovanna Multani LPN on 7/29/2024 at 12:44 PM     "

## 2024-08-06 ENCOUNTER — HOSPITAL ENCOUNTER (OUTPATIENT)
Dept: MAMMOGRAPHY | Facility: OTHER | Age: 64
Discharge: HOME | End: 2024-08-06
Attending: STUDENT IN AN ORGANIZED HEALTH CARE EDUCATION/TRAINING PROGRAM | Admitting: STUDENT IN AN ORGANIZED HEALTH CARE EDUCATION/TRAINING PROGRAM
Payer: MEDICARE

## 2024-08-06 DIAGNOSIS — Z00.00 ENCOUNTER FOR MEDICARE ANNUAL WELLNESS EXAM: ICD-10-CM

## 2024-08-06 DIAGNOSIS — Z12.31 ENCOUNTER FOR SCREENING MAMMOGRAM FOR MALIGNANT NEOPLASM OF BREAST: ICD-10-CM

## 2024-08-06 PROCEDURE — 77063 BREAST TOMOSYNTHESIS BI: CPT

## 2024-08-07 ENCOUNTER — HOSPITAL ENCOUNTER (OUTPATIENT)
Dept: BONE DENSITY | Facility: OTHER | Age: 64
Discharge: HOME | End: 2024-08-07
Attending: STUDENT IN AN ORGANIZED HEALTH CARE EDUCATION/TRAINING PROGRAM | Admitting: STUDENT IN AN ORGANIZED HEALTH CARE EDUCATION/TRAINING PROGRAM
Payer: MEDICARE

## 2024-08-07 DIAGNOSIS — E28.319 ASYMPTOMATIC PREMATURE MENOPAUSE: ICD-10-CM

## 2024-08-07 DIAGNOSIS — Z00.00 ENCOUNTER FOR MEDICARE ANNUAL WELLNESS EXAM: ICD-10-CM

## 2024-08-07 PROCEDURE — 77080 DXA BONE DENSITY AXIAL: CPT

## 2024-08-22 ENCOUNTER — TELEPHONE (OUTPATIENT)
Dept: FAMILY MEDICINE | Facility: OTHER | Age: 64
End: 2024-08-22
Payer: COMMERCIAL

## 2024-08-22 NOTE — TELEPHONE ENCOUNTER
Please call the patient with the results of her bone density test. She stated the pharmacy has an order for calcium for her but she never heard anything about the test results. Wants to know more before starting calcium.

## 2024-08-22 NOTE — TELEPHONE ENCOUNTER
Patient notified of response and recommendations per provider.  She is inquiring what would be considered 'weight bearing exercises.'  Also, she is unable to find the results from Dexa Scan on [employer] and is requesting letter from that response be mailed to her.    Iveth Yuan LPN............8/22/2024 4:31 PM

## 2024-08-22 NOTE — TELEPHONE ENCOUNTER
Per hugo bergeron I sent:    Hello -     Here are my comments about the recent results. DEXA scan shows osteopenia (thinning of bone) but not osteoporosis. Ways to decrease progression to osteoporosis include weight bearing exercises, and over the counter calcium and vitamin D supplements. I sent that in to your pharmacy     Please let us know if you have any questions or concerns.     Regards,  Tato Lopez MD

## 2024-08-22 NOTE — LETTER
August 23, 2024      Felicitas Rojas  [address] [place] [address]        Dear ,    We are writing to inform you of your test results.    Here are my comments about the recent results. DEXA scan shows osteopenia (thinning of bone) but not osteoporosis. Ways to decrease progression to osteoporosis include weight bearing exercises (walking, running, stairs), and over the counter calcium and vitamin D supplements. I sent that in to your pharmacy       If you have any questions or concerns, please call the clinic at the number listed above.       Sincerely,

## 2024-08-27 ENCOUNTER — HOSPITAL ENCOUNTER (OUTPATIENT)
Facility: OTHER | Age: 64
Discharge: HOME | End: 2024-08-27
Attending: SURGERY | Admitting: SURGERY
Payer: MEDICARE

## 2024-08-27 ENCOUNTER — ANESTHESIA (OUTPATIENT)
Dept: SURGERY | Facility: OTHER | Age: 64
End: 2024-08-27
Payer: MEDICARE

## 2024-08-27 ENCOUNTER — ANESTHESIA EVENT (OUTPATIENT)
Dept: SURGERY | Facility: OTHER | Age: 64
End: 2024-08-27
Payer: MEDICARE

## 2024-08-27 VITALS
SYSTOLIC BLOOD PRESSURE: 148 MMHG | TEMPERATURE: 97.1 F | WEIGHT: 183 LBS | DIASTOLIC BLOOD PRESSURE: 77 MMHG | RESPIRATION RATE: 18 BRPM | OXYGEN SATURATION: 99 % | HEART RATE: 51 BPM | BODY MASS INDEX: 28.88 KG/M2

## 2024-08-27 PROCEDURE — 250N000009 HC RX 250

## 2024-08-27 PROCEDURE — 45378 DIAGNOSTIC COLONOSCOPY: CPT | Performed by: SURGERY

## 2024-08-27 PROCEDURE — 999N000010 HC STATISTIC ANES STAT CODE-CRNA PER MINUTE: Performed by: SURGERY

## 2024-08-27 PROCEDURE — G0121 COLON CA SCRN NOT HI RSK IND: HCPCS | Performed by: SURGERY

## 2024-08-27 PROCEDURE — 45378 DIAGNOSTIC COLONOSCOPY: CPT

## 2024-08-27 PROCEDURE — 250N000011 HC RX IP 250 OP 636

## 2024-08-27 PROCEDURE — 258N000003 HC RX IP 258 OP 636: Performed by: SURGERY

## 2024-08-27 RX ORDER — FLUMAZENIL 0.1 MG/ML
0.2 INJECTION, SOLUTION INTRAVENOUS
Status: DISCONTINUED | OUTPATIENT
Start: 2024-08-27 | End: 2024-08-27 | Stop reason: HOSPADM

## 2024-08-27 RX ORDER — PROPOFOL 10 MG/ML
INJECTION, EMULSION INTRAVENOUS PRN
Status: DISCONTINUED | OUTPATIENT
Start: 2024-08-27 | End: 2024-08-27

## 2024-08-27 RX ORDER — BISACODYL 5 MG
5 TABLET, DELAYED RELEASE (ENTERIC COATED) ORAL ONCE
Status: ON HOLD | COMMUNITY
Start: 2024-08-25 | End: 2024-08-27

## 2024-08-27 RX ORDER — NALOXONE HYDROCHLORIDE 0.4 MG/ML
0.2 INJECTION, SOLUTION INTRAMUSCULAR; INTRAVENOUS; SUBCUTANEOUS
Status: DISCONTINUED | OUTPATIENT
Start: 2024-08-27 | End: 2024-08-27 | Stop reason: HOSPADM

## 2024-08-27 RX ORDER — NALOXONE HYDROCHLORIDE 0.4 MG/ML
0.4 INJECTION, SOLUTION INTRAMUSCULAR; INTRAVENOUS; SUBCUTANEOUS
Status: DISCONTINUED | OUTPATIENT
Start: 2024-08-27 | End: 2024-08-27 | Stop reason: HOSPADM

## 2024-08-27 RX ORDER — LIDOCAINE HYDROCHLORIDE 20 MG/ML
INJECTION, SOLUTION INFILTRATION; PERINEURAL PRN
Status: DISCONTINUED | OUTPATIENT
Start: 2024-08-27 | End: 2024-08-27

## 2024-08-27 RX ORDER — LIDOCAINE 40 MG/G
CREAM TOPICAL
Status: DISCONTINUED | OUTPATIENT
Start: 2024-08-27 | End: 2024-08-27 | Stop reason: HOSPADM

## 2024-08-27 RX ORDER — PROPOFOL 10 MG/ML
INJECTION, EMULSION INTRAVENOUS CONTINUOUS PRN
Status: DISCONTINUED | OUTPATIENT
Start: 2024-08-27 | End: 2024-08-27

## 2024-08-27 RX ORDER — POLYETHYLENE GLYCOL 3350, SODIUM CHLORIDE, SODIUM BICARBONATE, POTASSIUM CHLORIDE 420; 11.2; 5.72; 1.48 G/4L; G/4L; G/4L; G/4L
4000 POWDER, FOR SOLUTION ORAL ONCE
Status: ON HOLD | COMMUNITY
Start: 2024-08-25 | End: 2024-08-27

## 2024-08-27 RX ORDER — SODIUM CHLORIDE, SODIUM LACTATE, POTASSIUM CHLORIDE, CALCIUM CHLORIDE 600; 310; 30; 20 MG/100ML; MG/100ML; MG/100ML; MG/100ML
INJECTION, SOLUTION INTRAVENOUS CONTINUOUS
Status: DISCONTINUED | OUTPATIENT
Start: 2024-08-27 | End: 2024-08-27 | Stop reason: HOSPADM

## 2024-08-27 RX ADMIN — PROPOFOL 50 MG: 10 INJECTION, EMULSION INTRAVENOUS at 09:08

## 2024-08-27 RX ADMIN — PROPOFOL 140 MCG/KG/MIN: 10 INJECTION, EMULSION INTRAVENOUS at 09:08

## 2024-08-27 RX ADMIN — LIDOCAINE HYDROCHLORIDE 80 MG: 20 INJECTION, SOLUTION INFILTRATION; PERINEURAL at 09:08

## 2024-08-27 RX ADMIN — SODIUM CHLORIDE, POTASSIUM CHLORIDE, SODIUM LACTATE AND CALCIUM CHLORIDE: 600; 310; 30; 20 INJECTION, SOLUTION INTRAVENOUS at 08:52

## 2024-08-27 ASSESSMENT — ACTIVITIES OF DAILY LIVING (ADL)
ADLS_ACUITY_SCORE: 31

## 2024-08-27 ASSESSMENT — ENCOUNTER SYMPTOMS: DYSRHYTHMIAS: 0

## 2024-08-27 NOTE — OR NURSING
Felicitas has been discharged to home at 1052 via ambulatory accompanied by Cullen Herrera RN    Written discharge instructions were provided to patient.  Prescriptions were N/A.  Patient states their pain is zero, and denies any nausea or dizziness upon discharge.    Patient and adult caring for them verbalize understanding of discharge instructions including no driving until tomorrow and no longer taking narcotic pain medications - no operating mechanical equipment and no making any important decisions.They understand reason for discharge, and necessary follow-up appointments.      Sharon Christiansen RN on 8/27/2024 at 11:24 AM

## 2024-08-27 NOTE — ANESTHESIA CARE TRANSFER NOTE
Patient: Felicitas Rojas    Procedure: Procedure(s):  Colonoscopy       Diagnosis: Screen for colon cancer [Z12.11]  Diagnosis Additional Information: No value filed.    Anesthesia Type:   MAC     Note:    Oropharynx: oropharynx clear of all foreign objects and spontaneously breathing  Level of Consciousness: awake  Oxygen Supplementation: nasal cannula  Level of Supplemental Oxygen (L/min / FiO2): 2  Independent Airway: airway patency satisfactory and stable  Dentition: dentition unchanged  Vital Signs Stable: post-procedure vital signs reviewed and stable  Report to RN Given: handoff report given  Patient transferred to: Phase II    Handoff Report: Identifed the Patient, Identified the Reponsible Provider, Reviewed the pertinent medical history, Discussed the surgical course, Reviewed Intra-OP anesthesia mangement and issues during anesthesia, Set expectations for post-procedure period and Allowed opportunity for questions and acknowledgement of understanding    Vitals:  Vitals Value Taken Time   /77 08/27/24 1030   Temp 97.1  F (36.2  C) 08/27/24 0933   Pulse 51 08/27/24 1030   Resp 18 08/27/24 1030   SpO2 98 % 08/27/24 1032   Vitals shown include unfiled device data.    Electronically Signed By: LATASHA Mccartney CRNA  August 27, 2024  12:47 PM

## 2024-08-27 NOTE — DISCHARGE INSTRUCTIONS
[facility]  Adult Discharge Orders & Instructions      For 24 hours after surgery:  Get plenty of rest.  A responsible adult must stay with you for at least 24 hours after you leave the hospital.   You may feel lightheaded.  IF so, sit for a few minutes before standing.  Have someone help you get up.   You may have a slight fever. Call the doctor if your fever is over 101 F (38.3 C) (taken under the tongue) or lasts longer than 24 hours.  You may have a dry mouth, a sore throat, muscle aches or trouble sleeping.  These should go away after 24 hours.  Do not make important or legal decisions.  6.   Do not drive or use heavy equipment.  If you have weakness or tingling, don't drive or use heavy equipment until this feeling goes away.  To contact a doctor, call    [phone]______________

## 2024-08-27 NOTE — ANESTHESIA PREPROCEDURE EVALUATION
"Anesthesia Pre-Procedure Evaluation    Patient: Felicitas Rojas   MRN: [id number] : 1960        Procedure : Procedure(s):  Colonoscopy          Past Medical History:   Diagnosis Date    Asymptomatic menopausal state     2014    BCC (basal cell carcinoma), face 3/23/2019    Left forehead lesion biopsy positive BCC--followup [facility]--consult skin exam 3/19--scheduled MOHS excision procedure  [facility]21[facility]19 [facility]    Personal history of other medical treatment (CODE)     L5-S1    Personal history of other medical treatment (CODE)     2010,R knee; chronic mild problems    Positive colorectal cancer screening using Cologuard test 2/10/2020      Past Surgical History:   Procedure Laterality Date    ARTHROTOMY WRIST      ,Surgery for De Quervain's tenosynovitis right wrist about     FINGER SURGERY      ,Ulnar collateral ligament repair left thumb, Dr. Joy    LAPAROSCOPIC APPENDECTOMY N/A 2019    Procedure: APPENDECTOMY, LAPAROSCOPIC;  Surgeon: Alex Obrien MD;  Location: [facility] OR    OTHER SURGICAL HISTORY      ,[id number],OTHER,Left,thumb fracture    OTHER SURGICAL HISTORY       right &  left,[id number],OTHER    OTHER SURGICAL HISTORY      02,82882.0,SKIN/SUBCUTANEOUS SURGERY,Excision of an intradermal nevus of the preauricular area      Allergies   Allergen Reactions    Aspartame Palpitations     Triggers A-Fib    Caffeine Palpitations     Triggers A-Fib    Monosodium Glutamate Palpitations     Triggers A-Fib    Cephalexin Itching     pt wrote \"redness irritation, inflamation , pus at point of incision\"  questionable allergy    Cyclobenzaprine      Other reaction(s): Dizziness  Lightheadedness. Patient unsure if this is true.     Sulfa Antibiotics      Other reaction(s): *Unknown - Childhood Rxn    Sulfacetamide Unknown    Vitamin B12 Unknown    Ambien [Zolpidem]      Caused A-fib.     Vitamin B1 & B12  [Trophite] Rash     Sublingual vitamin    "   Social History     Tobacco Use    Smoking status: Never     Passive exposure: Never    Smokeless tobacco: Never   Substance Use Topics    Alcohol use: No     Alcohol/week: 0.0 standard drinks of alcohol      Wt Readings from Last 1 Encounters:   08/27/24 83 kg (183 lb)        Anesthesia Evaluation   Pt has had prior anesthetic. Type: General.    No history of anesthetic complications       ROS/MED HX  ENT/Pulmonary:  - neg pulmonary ROS     Neurologic:  - neg neurologic ROS     Cardiovascular:     (+)  - -   -  - -                          Irregular Heartbeat/Palpitations,         (-) arrhythmias   METS/Exercise Tolerance: >4 METS Comment: Paroxysmal atrial fibrillation.    Hematologic:  - neg hematologic  ROS     Musculoskeletal:  - neg musculoskeletal ROS     GI/Hepatic:     (+)        bowel prep,            Renal/Genitourinary:  - neg Renal ROS     Endo:  - neg endo ROS     Psychiatric/Substance Use:  - neg psychiatric ROS     Infectious Disease:  - neg infectious disease ROS     Malignancy:  - neg malignancy ROS     Other:  - neg other ROS          Physical Exam    Airway        Mallampati: I   TM distance: > 3 FB   Neck ROM: full   Mouth opening: > 3 cm    Respiratory Devices and Support         Dental       (+) Minor Abnormalities - some fillings, tiny chips      Cardiovascular   cardiovascular exam normal       Rhythm and rate: regular and normal     Pulmonary   pulmonary exam normal        breath sounds clear to auscultation           OUTSIDE LABS:  CBC:   Lab Results   Component Value Date    WBC 5.5 07/29/2024    WBC 4.8 09/12/2022    HGB 13.4 07/29/2024    HGB 12.8 09/12/2022    HCT 41.4 07/29/2024    HCT 38.5 09/12/2022     07/29/2024     09/12/2022     BMP:   Lab Results   Component Value Date     07/29/2024     09/12/2022    POTASSIUM 3.7 07/29/2024    POTASSIUM 4.1 09/12/2022    CHLORIDE 105 07/29/2024    CHLORIDE 106 09/12/2022    CO2 27 07/29/2024    CO2 29 09/12/2022     "BUN 20.8 07/29/2024    BUN 18 09/12/2022    CR 0.80 07/29/2024    CR 0.85 09/12/2022    GLC 98 07/29/2024    GLC 97 09/12/2022     COAGS:   Lab Results   Component Value Date    PTT 36 05/10/2016    INR 1.1 09/06/2017     POC: No results found for: \"BGM\", \"HCG\", \"HCGS\"  HEPATIC:   Lab Results   Component Value Date    ALBUMIN 4.1 09/12/2022    PROTTOTAL 7.2 09/12/2022    ALT 14 09/12/2022    AST 20 09/12/2022    ALKPHOS 81 09/12/2022    BILITOTAL 0.5 09/12/2022    ZANE 29 08/30/2019     OTHER:   Lab Results   Component Value Date    LACT 1.3 08/30/2019    CORRY 9.6 07/29/2024    MAG 2.1 09/12/2022    LIPASE 20 08/30/2019    TSH 1.00 09/12/2022    CRP 1.3 (H) 08/30/2019       Anesthesia Plan    ASA Status:  2    NPO Status:  NPO Appropriate    Anesthesia Type: MAC.     - Reason for MAC: straight local not clinically adequate   Induction: Intravenous.           Consents    Anesthesia Plan(s) and associated risks, benefits, and realistic alternatives discussed. Questions answered and patient/representative(s) expressed understanding.     - Discussed: Risks, Benefits and Alternatives for BOTH SEDATION and the PROCEDURE were discussed     - Discussed with:  Patient            Postoperative Care            Comments:               LATASHA VILLARREAL CRNA    I have reviewed the pertinent notes and labs in the chart from the past 30 days and (re)examined the patient.  Any updates or changes from those notes are reflected in this note.              # Overweight: Estimated body mass index is 28.88 kg/m  as calculated from the following:    Height as of 7/29/24: 1.695 m (5' 6.75\").    Weight as of this encounter: 83 kg (183 lb).      "

## 2024-08-27 NOTE — H&P
GENERAL SURGERY CONSULTATION NOTE    Felicitas Rojas   [address]  [facility] [address]  64 year old  female    Primary Care Provider:  Tato Galloway      HPI: Felicitas Rojas presents to day surgery in need of colonoscopy for screening for colon cancer.   Felicitas Rojas denies family history of colon cancer. Patient denies change in bowel habits or blood in stools. Previous colonoscopy was never.     REVIEW OF SYSTEMS:    GENERAL: No fevers or chills. Denies fatigue, recent weight loss.  HEENT: No sinus drainage. No changes with vision or hearing. No difficulty swallowing.   LYMPHATICS:  Noswollen nodes in axilla, neck or groin.  CARDIOVASCULAR: Denies chest pain, palpitations and dyspnea on exertion.  PULMONARY: No shortness of breath or cough. No increase in sputum production.  GI: Denies melena,bright red blood in stools. No hematemesis. No constipation or diarrhea.  : No dysuria or hematuria.  SKIN: No recent rashes or ulcers.   HEMATOLOGY:  No history of easy bruising or bleeding.  ENDOCRINE:  No history of diabetes or thyroid problems.  NEUROLOGY:  No history of seizures or headaches. No motor or sensory changes.        Patient Active Problem List   Diagnosis    Acne rosacea    Atypical chest pain    Dizzy spells    Mixed hyperlipidemia    Elevated BP without diagnosis of hypertension    Displacement of intervertebral disc    Other tear of cartilage or meniscus of knee, current    PAF (paroxysmal atrial fibrillation) (H)    Pityriasis lichenoides chronica    Postmenopausal    Weight gain    Ventricular ectopy    PAC (premature atrial contraction)    Palpitations    BCC (basal cell carcinoma), face    S/P laparoscopic appendectomy    Dizziness    H/O ocular migraine    Positive colorectal cancer screening using Cologuard test    Acute right-sided low back pain without sciatica    Back strain, initial encounter       Past Medical History:   Diagnosis Date    Asymptomatic menopausal state      7/17/2014    BCC (basal cell carcinoma), face 3/23/2019    Left forehead lesion biopsy positive BCC--followup [facility]--consult skin exam 3/19--scheduled MOHS excision procedure 4/19 [facility] 3/21/19 [facility]    Personal history of other medical treatment (CODE)     L5-S1    Personal history of other medical treatment (CODE)     9/13/2010,R knee; chronic mild problems    Positive colorectal cancer screening using Cologuard test 2/10/2020       Past Surgical History:   Procedure Laterality Date    ARTHROTOMY WRIST      1985,Surgery for De Quervain's tenosynovitis right wrist about 1985    FINGER SURGERY      03/06,Ulnar collateral ligament repair left thumb, Dr. Joy    LAPAROSCOPIC APPENDECTOMY N/A 8/30/2019    Procedure: APPENDECTOMY, LAPAROSCOPIC;  Surgeon: Alex Obrien MD;  Location: [facility] OR    OTHER SURGICAL HISTORY      2006,[id number],OTHER,Left,thumb fracture    OTHER SURGICAL HISTORY      1985 right & 2006 left,[id number],OTHER    OTHER SURGICAL HISTORY      01/4/02,16317.0,SKIN/SUBCUTANEOUS SURGERY,Excision of an intradermal nevus of the preauricular area       Family History   Problem Relation Age of Onset    Other - See Comments Mother 70        Alzheimer's    Prostate Cancer Father         Cancer-prostate    Diabetes Father         Diabetes    Other - See Comments Father         GI Disease,colon polyps    Heart Disease Father         Heart Disease,CAD    Cancer Paternal Grandmother         Cancer,eye cancer, type unknown    Other - See Comments No family hx of         Stroke,No FHx of CAD    Breast Cancer No family hx of         Cancer-breast       Social History     Social History Narrative    .  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  Cullen Spouse   Children   Vinnie born 1985,   Tony born 1989.       Social History     Socioeconomic History    Marital status:      Spouse name: Not on file    Number of children: Not on file    Years of education: Not on file     Highest education level: Not on file   Occupational History    Occupation: EXECUTIVE OFFICE [employer]   Tobacco Use    Smoking status: Never     Passive exposure: Never    Smokeless tobacco: Never   Vaping Use    Vaping status: Never Used   Substance and Sexual Activity    Alcohol use: No     Alcohol/week: 0.0 standard drinks of alcohol    Drug use: No    Sexual activity: Yes     Partners: Male   Other Topics Concern    Not on file   Social History Narrative    .  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  Cullen Spouse   Children   Vinnie born 1985,   Tony born 1989.     Social Determinants of Health     Financial Resource Strain: Low Risk  (7/29/2024)    Financial Resource Strain     Within the past 12 months, have you or your family members you live with been unable to get utilities (heat, electricity) when it was really needed?: No   Food Insecurity: Low Risk  (7/29/2024)    Food Insecurity     Within the past 12 months, did you worry that your food would run out before you got money to buy more?: No     Within the past 12 months, did the food you bought just not last and you didn t have money to get more?: No   Transportation Needs: Low Risk  (7/29/2024)    Transportation Needs     Within the past 12 months, has lack of transportation kept you from medical appointments, getting your medicines, non-medical meetings or appointments, work, or from getting things that you need?: No   Physical Activity: Unknown (7/29/2024)    Exercise Vital Sign     Days of Exercise per Week: 0 days     Minutes of Exercise per Session: Not on file   Stress: No Stress Concern Present (7/29/2024)    [place] - Occupational Stress Questionnaire     Feeling of Stress : Not at all   Social Connections: Unknown (7/29/2024)    Social Connection and Isolation Panel [NHANES]     Frequency of Communication with Friends and Family: Not on file     Frequency of Social Gatherings with  "Friends and Family: Patient declined     Attends Congregational Services: Not on file     Active Member of Clubs or Organizations: Not on file     Attends Club or Organization Meetings: Not on file     Marital Status: Not on file   Interpersonal Safety: Low Risk  (8/27/2024)    Interpersonal Safety     Do you feel physically and emotionally safe where you currently live?: Yes     Within the past 12 months, have you been hit, slapped, kicked or otherwise physically hurt by someone?: No     Within the past 12 months, have you been humiliated or emotionally abused in other ways by your partner or ex-partner?: No   Housing Stability: Low Risk  (7/29/2024)    Housing Stability     Do you have housing? : Yes     Are you worried about losing your housing?: No       Current Facility-Administered Medications   Medication Dose Route Frequency Provider Last Rate Last Admin    lactated ringers infusion   Intravenous Continuous Robinson Matta MD        lidocaine (LMX4) cream   Topical Q1H PRN Robinson Matta MD        lidocaine 1 % 0.1-1 mL  0.1-1 mL Other Q1H PRN Robinson Matta MD        sodium chloride (PF) 0.9% PF flush 3 mL  3 mL Intracatheter Q8H Robinson Matta MD        sodium chloride (PF) 0.9% PF flush 3 mL  3 mL Intracatheter q1 min prn Robinson Matta MD             ALLERGIES/SENSITIVITIES:   Allergies   Allergen Reactions    Aspartame Palpitations     Triggers A-Fib    Caffeine Palpitations     Triggers A-Fib    Monosodium Glutamate Palpitations     Triggers A-Fib    Cephalexin Itching     pt wrote \"redness irritation, inflamation , pus at point of incision\"  questionable allergy    Cyclobenzaprine      Other reaction(s): Dizziness  Lightheadedness. Patient unsure if this is true.     Sulfa Antibiotics      Other reaction(s): *Unknown - Childhood Rxn    Sulfacetamide Unknown    Vitamin B12 Unknown    Ambien [Zolpidem]      Caused A-fib.     Vitamin B1 & B12  [Trophite] Rash     " Sublingual vitamin       PHYSICAL EXAM:     BP (!) 147/85 (Cuff Size: Adult Regular)   Temp 98.4  F (36.9  C) (Tympanic)   Resp 16   Wt 83 kg (183 lb)   LMP  (LMP Unknown)   SpO2 95%   BMI 28.88 kg/m      General Appearance:   Sitting up in bed, no apparent distress  HEENT: Pupils are equal and reactive, no scleral icterus   Heart & CV:  RRR, no murmur.  LUNGS: No increased work of breathing. Lungs are CTA B/L, no wheezing or crackles.  Abd:  soft, non-tender, no masses   Ext: no lower extremity edema   Neuro: alert and oriented, normal speech and mentation         CONSULTATION ASSESSMENT AND PLAN:    64 year old female with average risk for colon cancer.      The technical details of colonoscopy were discussed with the patient along with the risks and benefits to include bleeding, perforation and incomplete study. Felicitas Rojas demonstrated understanding and is willing to proceed.       Robinson Matta MD on 8/27/2024 at 8:52 AM

## 2024-08-27 NOTE — OP NOTE
COLONOSCOPY PROCEDURE NOTE    DATE OF SERVICE: 8/27/2024    SURGEON: DARWIN Matta MD     PRE-OP DIAGNOSIS:    Screening for colon cancer     POST-OP DIAGNOSIS:    Normal Exam      PROCEDURE:   Colonoscopy    ASSISTANT:  Circulator: Gaby Loco RN  Relief Circulator: Rosy Almanzar RN  Scrub Person: Shanita Hopkins    ANESTHESIA:  MAC                            MACCRNA Independent: Gayle Obrien APRN CRNA    INDICATION FOR THE PROCEDURE: The patient is a 64 year old female with average risk for colon cancer. The patient has no other complaints.  After explaining the risks to include bleeding, perforation, potential inability to reach the cecum the patient wishes to proceed.    PROCEDURE: After adequate sedation, the patient was in the left lateral decubitus position.  Rectal exam was performed.  There was normal tone and no palpable masses.  The colonoscope was introduced into the rectum and advanced to the cecum with Mild difficulty.  The patient's prep was excellent.  The terminal cecum was reached.  The cecum, ascending, transverse, descending and sigmoid colon were grossly unremarkable.  The scope was retroflexed in the rectum.  The anorectal junction was unremarkable.  The scope was straightened and removed.  The patient tolerated the procedure well.     ESTIMATED BLOOD LOSS: none    COMPLICATIONS:  None    TISSUE REMOVED:  No    RECOMMEND:    Follow-up in 10 years      DARWIN Matta MD

## 2024-08-27 NOTE — ANESTHESIA POSTPROCEDURE EVALUATION
Patient: Felicitas Rojas    Procedure: Procedure(s):  Colonoscopy       Anesthesia Type:  MAC    Note:  Disposition: Outpatient   Postop Pain Control: Uneventful            Sign Out: Well controlled pain   PONV: No   Neuro/Psych: Uneventful            Sign Out: Acceptable/Baseline neuro status   Airway/Respiratory: Uneventful            Sign Out: Acceptable/Baseline resp. status   CV/Hemodynamics: Uneventful            Sign Out: Acceptable CV status; No obvious hypovolemia; No obvious fluid overload   Other NRE: NONE   DID A NON-ROUTINE EVENT OCCUR? No           Last vitals:  Vitals Value Taken Time   /77 08/27/24 1030   Temp 97.1  F (36.2  C) 08/27/24 0933   Pulse 51 08/27/24 1030   Resp 18 08/27/24 1030   SpO2 98 % 08/27/24 1032   Vitals shown include unfiled device data.    Electronically Signed By: LATASHA Mccartney CRNA  August 27, 2024  12:47 PM

## 2025-08-30 ENCOUNTER — HEALTH MAINTENANCE LETTER (OUTPATIENT)
Age: 65
End: 2025-08-30

## (undated) DEVICE — ENDO TROCAR SLEEVE KII 5X100MM CTR03

## (undated) DEVICE — COVER LIGHT HANDLE LT-F02

## (undated) DEVICE — ENDO KIT COMPLIANCE DYKENDOCMPLY

## (undated) DEVICE — ENDO POUCH UNIV RETRIEVAL SYSTEM INZII 10MM CD001

## (undated) DEVICE — PREP CHLORAPREP 26ML TINTED ORANGE  260815

## (undated) DEVICE — STPL ENDO RELOAD ECHELON 45 WHITE 2.5MM ECR45W

## (undated) DEVICE — ENDO BRUSH CHANNEL MASTER CLEANING 2-4.2MM BW-412T

## (undated) DEVICE — SUCTION MANIFOLD NEPTUNE 2 SYS 4 PORT 0702-020-000

## (undated) DEVICE — RELOAD-WHITE 45MM ECHELON ENDOPATH

## (undated) DEVICE — SUCTION STRYKERFLOW II 250-070-500

## (undated) DEVICE — NDL-INSUFFLATION 120MM

## (undated) DEVICE — GLOVE BIOGEL INDICATOR 7.5 LF 41675

## (undated) DEVICE — ENDO TROCAR SLEEVE KII Z-THREADED 05X100MM CTS02

## (undated) DEVICE — BLADE CLIPPER 4406

## (undated) DEVICE — ESU GROUND PAD ADULT W/CORD E7507

## (undated) DEVICE — GLOVE PROTEXIS POWDER FREE SMT 7.5  2D72PT75X

## (undated) DEVICE — DRSG MEDIPORE 2X2 3/4" 3562

## (undated) DEVICE — SLEEVE COMPRESSION SCD KNEE MED 74022

## (undated) DEVICE — SOL WATER 1500ML

## (undated) DEVICE — ENDO TROCAR FIRST ENTRY KII FIOS Z-THRD 12X100MM CTF73

## (undated) DEVICE — PACK LAPAROSCOPY LF SBA15LPFCA

## (undated) DEVICE — RELOAD-BLUE 45MM ECHELON ENDOPATH

## (undated) DEVICE — SYR 10ML LL W/O NDL

## (undated) DEVICE — SU VICRYL 0 UR-6 27" J603H

## (undated) DEVICE — TUBING INSUFFLATOR W/FILTER OLYMPUS WA95005A

## (undated) DEVICE — STPL POWERED ECHELON 45MM PSEE45A

## (undated) DEVICE — TUBING SUCTION 10'X3/16" N510

## (undated) RX ORDER — PROPOFOL 10 MG/ML
INJECTION, EMULSION INTRAVENOUS
Status: DISPENSED
Start: 2019-08-30

## (undated) RX ORDER — BUPIVACAINE HYDROCHLORIDE AND EPINEPHRINE 5; 5 MG/ML; UG/ML
INJECTION, SOLUTION EPIDURAL; INTRACAUDAL; PERINEURAL
Status: DISPENSED
Start: 2019-08-30

## (undated) RX ORDER — SODIUM CHLORIDE, SODIUM LACTATE, POTASSIUM CHLORIDE, CALCIUM CHLORIDE 600; 310; 30; 20 MG/100ML; MG/100ML; MG/100ML; MG/100ML
INJECTION, SOLUTION INTRAVENOUS
Status: DISPENSED
Start: 2019-08-30

## (undated) RX ORDER — ONDANSETRON 2 MG/ML
INJECTION INTRAMUSCULAR; INTRAVENOUS
Status: DISPENSED
Start: 2019-08-30

## (undated) RX ORDER — NEOSTIGMINE METHYLSULFATE 0.5 MG/ML
INJECTION INTRAVENOUS
Status: DISPENSED
Start: 2019-08-30

## (undated) RX ORDER — HYDROMORPHONE HYDROCHLORIDE 2 MG/ML
INJECTION, SOLUTION INTRAMUSCULAR; INTRAVENOUS; SUBCUTANEOUS
Status: DISPENSED
Start: 2019-08-30

## (undated) RX ORDER — PHENYLEPHRINE HCL IN 0.9% NACL 1 MG/10 ML
SYRINGE (ML) INTRAVENOUS
Status: DISPENSED
Start: 2019-08-30

## (undated) RX ORDER — DEXAMETHASONE SODIUM PHOSPHATE 4 MG/ML
INJECTION, SOLUTION INTRA-ARTICULAR; INTRALESIONAL; INTRAMUSCULAR; INTRAVENOUS; SOFT TISSUE
Status: DISPENSED
Start: 2019-08-30

## (undated) RX ORDER — PROPOFOL 10 MG/ML
INJECTION, EMULSION INTRAVENOUS
Status: DISPENSED
Start: 2024-08-27

## (undated) RX ORDER — KETAMINE HYDROCHLORIDE 50 MG/ML
INJECTION, SOLUTION INTRAMUSCULAR; INTRAVENOUS
Status: DISPENSED
Start: 2019-08-30

## (undated) RX ORDER — GLYCOPYRROLATE 0.2 MG/ML
INJECTION, SOLUTION INTRAMUSCULAR; INTRAVENOUS
Status: DISPENSED
Start: 2019-08-30

## (undated) RX ORDER — FENTANYL CITRATE 50 UG/ML
INJECTION, SOLUTION INTRAMUSCULAR; INTRAVENOUS
Status: DISPENSED
Start: 2019-08-30

## (undated) RX ORDER — LIDOCAINE HYDROCHLORIDE 20 MG/ML
INJECTION, SOLUTION EPIDURAL; INFILTRATION; INTRACAUDAL; PERINEURAL
Status: DISPENSED
Start: 2019-08-30

## (undated) RX ORDER — SODIUM CHLORIDE 9 MG/ML
INJECTION, SOLUTION INTRAVENOUS
Status: DISPENSED
Start: 2019-08-30